# Patient Record
Sex: FEMALE | Race: WHITE | Employment: FULL TIME | URBAN - METROPOLITAN AREA
[De-identification: names, ages, dates, MRNs, and addresses within clinical notes are randomized per-mention and may not be internally consistent; named-entity substitution may affect disease eponyms.]

---
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
## 2022-01-29 ENCOUNTER — HEALTH MAINTENANCE LETTER (OUTPATIENT)
Age: 22
End: 2022-01-29

## 2022-02-07 DIAGNOSIS — M32.9 SYSTEMIC LUPUS ERYTHEMATOSUS, UNSPECIFIED SLE TYPE, UNSPECIFIED ORGAN INVOLVEMENT STATUS (H): Primary | ICD-10-CM

## 2022-02-07 DIAGNOSIS — M32.19 OTHER SYSTEMIC LUPUS ERYTHEMATOSUS WITH OTHER ORGAN INVOLVEMENT (H): Chronic | ICD-10-CM

## 2022-02-07 RX ORDER — PANTOPRAZOLE SODIUM 40 MG/1
40 TABLET, DELAYED RELEASE ORAL DAILY
Qty: 90 TABLET | Refills: 1 | Status: SHIPPED | OUTPATIENT
Start: 2022-02-07 | End: 2022-08-22

## 2022-02-07 RX ORDER — MYCOPHENOLATE MOFETIL 500 MG/1
TABLET ORAL
Qty: 450 TABLET | Refills: 0 | Status: SHIPPED | OUTPATIENT
Start: 2022-02-07 | End: 2022-05-16

## 2022-03-03 ENCOUNTER — DOCUMENTATION ONLY (OUTPATIENT)
Dept: RHEUMATOLOGY | Facility: CLINIC | Age: 22
End: 2022-03-03

## 2022-03-03 ENCOUNTER — OFFICE VISIT (OUTPATIENT)
Dept: RHEUMATOLOGY | Facility: CLINIC | Age: 22
End: 2022-03-03
Attending: INTERNAL MEDICINE
Payer: COMMERCIAL

## 2022-03-03 VITALS
SYSTOLIC BLOOD PRESSURE: 117 MMHG | TEMPERATURE: 98.3 F | WEIGHT: 143.3 LBS | HEART RATE: 82 BPM | DIASTOLIC BLOOD PRESSURE: 78 MMHG | BODY MASS INDEX: 25.39 KG/M2 | HEIGHT: 63 IN

## 2022-03-03 DIAGNOSIS — F41.9 ANXIETY: ICD-10-CM

## 2022-03-03 DIAGNOSIS — F32.A DEPRESSION, UNSPECIFIED DEPRESSION TYPE: ICD-10-CM

## 2022-03-03 DIAGNOSIS — M32.9 SYSTEMIC LUPUS ERYTHEMATOSUS, UNSPECIFIED SLE TYPE, UNSPECIFIED ORGAN INVOLVEMENT STATUS (H): Primary | ICD-10-CM

## 2022-03-03 LAB
ALBUMIN SERPL-MCNC: 3.8 G/DL (ref 3.4–5)
ALBUMIN UR-MCNC: 10 MG/DL
ALP SERPL-CCNC: 65 U/L (ref 40–150)
ALT SERPL W P-5'-P-CCNC: 28 U/L (ref 0–50)
APPEARANCE UR: CLEAR
AST SERPL W P-5'-P-CCNC: 18 U/L (ref 0–35)
BASOPHILS # BLD AUTO: 0 10E3/UL (ref 0–0.2)
BASOPHILS NFR BLD AUTO: 0 %
BILIRUB DIRECT SERPL-MCNC: 0.1 MG/DL (ref 0–0.2)
BILIRUB SERPL-MCNC: 0.4 MG/DL (ref 0.2–1.3)
BILIRUB UR QL STRIP: NEGATIVE
COLOR UR AUTO: YELLOW
CREAT SERPL-MCNC: 0.64 MG/DL (ref 0.5–1)
CREAT UR-MCNC: 200 MG/DL
CRP SERPL-MCNC: <2.9 MG/L (ref 0–8)
EOSINOPHIL # BLD AUTO: 0.1 10E3/UL (ref 0–0.7)
EOSINOPHIL NFR BLD AUTO: 2 %
ERYTHROCYTE [DISTWIDTH] IN BLOOD BY AUTOMATED COUNT: 11.9 % (ref 10–15)
GFR SERPL CREATININE-BSD FRML MDRD: >90 ML/MIN/1.73M2
GLUCOSE UR STRIP-MCNC: NEGATIVE MG/DL
HCT VFR BLD AUTO: 39.4 % (ref 35–47)
HGB BLD-MCNC: 13.1 G/DL (ref 11.7–15.7)
HGB UR QL STRIP: NEGATIVE
IMM GRANULOCYTES # BLD: 0 10E3/UL
IMM GRANULOCYTES NFR BLD: 0 %
KETONES UR STRIP-MCNC: NEGATIVE MG/DL
LEUKOCYTE ESTERASE UR QL STRIP: NEGATIVE
LYMPHOCYTES # BLD AUTO: 2.2 10E3/UL (ref 0.8–5.3)
LYMPHOCYTES NFR BLD AUTO: 36 %
MCH RBC QN AUTO: 30 PG (ref 26.5–33)
MCHC RBC AUTO-ENTMCNC: 33.2 G/DL (ref 31.5–36.5)
MCV RBC AUTO: 90 FL (ref 78–100)
MONOCYTES # BLD AUTO: 0.4 10E3/UL (ref 0–1.3)
MONOCYTES NFR BLD AUTO: 7 %
MUCOUS THREADS #/AREA URNS LPF: PRESENT /LPF
NEUTROPHILS # BLD AUTO: 3.3 10E3/UL (ref 1.6–8.3)
NEUTROPHILS NFR BLD AUTO: 55 %
NITRATE UR QL: NEGATIVE
NRBC # BLD AUTO: 0 10E3/UL
NRBC BLD AUTO-RTO: 0 /100
PH UR STRIP: 6.5 [PH] (ref 5–7)
PLATELET # BLD AUTO: 337 10E3/UL (ref 150–450)
PROT SERPL-MCNC: 7 G/DL (ref 6.8–8.8)
PROT UR-MCNC: 0.13 G/L
PROT/CREAT 24H UR: 0.07 G/G CR (ref 0–0.2)
RBC # BLD AUTO: 4.36 10E6/UL (ref 3.8–5.2)
RBC URINE: <1 /HPF
SP GR UR STRIP: 1.02 (ref 1–1.03)
SQUAMOUS EPITHELIAL: 2 /HPF
TSH SERPL DL<=0.005 MIU/L-ACNC: 0.55 MU/L (ref 0.4–4)
UROBILINOGEN UR STRIP-MCNC: NORMAL MG/DL
VIT B12 SERPL-MCNC: 563 PG/ML (ref 193–986)
WBC # BLD AUTO: 6.1 10E3/UL (ref 4–11)
WBC URINE: 0 /HPF

## 2022-03-03 PROCEDURE — 99214 OFFICE O/P EST MOD 30 MIN: CPT | Performed by: INTERNAL MEDICINE

## 2022-03-03 PROCEDURE — 86160 COMPLEMENT ANTIGEN: CPT | Performed by: INTERNAL MEDICINE

## 2022-03-03 PROCEDURE — 36415 COLL VENOUS BLD VENIPUNCTURE: CPT | Performed by: INTERNAL MEDICINE

## 2022-03-03 PROCEDURE — 84156 ASSAY OF PROTEIN URINE: CPT | Performed by: INTERNAL MEDICINE

## 2022-03-03 PROCEDURE — 82040 ASSAY OF SERUM ALBUMIN: CPT | Performed by: INTERNAL MEDICINE

## 2022-03-03 PROCEDURE — 84443 ASSAY THYROID STIM HORMONE: CPT | Performed by: INTERNAL MEDICINE

## 2022-03-03 PROCEDURE — 82607 VITAMIN B-12: CPT | Performed by: INTERNAL MEDICINE

## 2022-03-03 PROCEDURE — 86140 C-REACTIVE PROTEIN: CPT | Performed by: INTERNAL MEDICINE

## 2022-03-03 PROCEDURE — 85025 COMPLETE CBC W/AUTO DIFF WBC: CPT | Performed by: INTERNAL MEDICINE

## 2022-03-03 PROCEDURE — 81001 URINALYSIS AUTO W/SCOPE: CPT | Performed by: INTERNAL MEDICINE

## 2022-03-03 PROCEDURE — 82542 COL CHROMOTOGRAPHY QUAL/QUAN: CPT | Performed by: INTERNAL MEDICINE

## 2022-03-03 PROCEDURE — G0463 HOSPITAL OUTPT CLINIC VISIT: HCPCS

## 2022-03-03 PROCEDURE — 82306 VITAMIN D 25 HYDROXY: CPT | Performed by: INTERNAL MEDICINE

## 2022-03-03 PROCEDURE — 86225 DNA ANTIBODY NATIVE: CPT | Performed by: INTERNAL MEDICINE

## 2022-03-03 PROCEDURE — 82565 ASSAY OF CREATININE: CPT | Performed by: INTERNAL MEDICINE

## 2022-03-03 RX ORDER — NABUMETONE 500 MG/1
1000 TABLET, FILM COATED ORAL DAILY
Qty: 60 TABLET | Refills: 5 | Status: SHIPPED | OUTPATIENT
Start: 2022-03-03 | End: 2022-05-26

## 2022-03-03 ASSESSMENT — ANXIETY QUESTIONNAIRES
1. FEELING NERVOUS, ANXIOUS, OR ON EDGE: SEVERAL DAYS
7. FEELING AFRAID AS IF SOMETHING AWFUL MIGHT HAPPEN: MORE THAN HALF THE DAYS
IF YOU CHECKED OFF ANY PROBLEMS ON THIS QUESTIONNAIRE, HOW DIFFICULT HAVE THESE PROBLEMS MADE IT FOR YOU TO DO YOUR WORK, TAKE CARE OF THINGS AT HOME, OR GET ALONG WITH OTHER PEOPLE: SOMEWHAT DIFFICULT
6. BECOMING EASILY ANNOYED OR IRRITABLE: SEVERAL DAYS
GAD7 TOTAL SCORE: 10
2. NOT BEING ABLE TO STOP OR CONTROL WORRYING: SEVERAL DAYS
3. WORRYING TOO MUCH ABOUT DIFFERENT THINGS: MORE THAN HALF THE DAYS
5. BEING SO RESTLESS THAT IT IS HARD TO SIT STILL: SEVERAL DAYS

## 2022-03-03 ASSESSMENT — PATIENT HEALTH QUESTIONNAIRE - PHQ9
5. POOR APPETITE OR OVEREATING: MORE THAN HALF THE DAYS
SUM OF ALL RESPONSES TO PHQ QUESTIONS 1-9: 13

## 2022-03-03 ASSESSMENT — PAIN SCALES - GENERAL: PAINLEVEL: NO PAIN (0)

## 2022-03-03 NOTE — PROGRESS NOTES
Medications:   As of completion of this visit:  Current Outpatient Medications   Medication Sig Dispense Refill     co-enzyme Q-10 100 MG CAPS capsule Take by mouth daily       escitalopram (LEXAPRO) 5 MG tablet        hydroxychloroquine (PLAQUENIL) 200 MG tablet Take 1 tablet (200 mg) by mouth daily For systemic lupus erythematosus diaganosis 90 tablet 1     levonorgestrel (KYLEENA) 19.5 MG IUD Kyleena 17.5 mcg/24 hrs (5yrs) 19.5mg intrauterine device   Take 1 device by intrauterine route.       mycophenolate (GENERIC EQUIVALENT) 500 MG tablet Take 2 tablets(1000 mg)in the a.m. And 3 tablets (1500 mg) in the p.m. 450 tablet 0     nabumetone (RELAFEN) 500 MG tablet Take 2 tablets (1,000 mg) by mouth daily 60 tablet 5     pantoprazole (PROTONIX) 40 MG EC tablet Take 1 tablet (40 mg) by mouth daily 90 tablet 1     vitamin D3 (CHOLECALCIFEROL) 2000 units (50 mcg) tablet Take 2,000 Units by mouth daily       Cobalamine Combinations (B12 FOLATE PO)  (Patient not taking: Reported on 12/16/2021)       Ferrous Sulfate (SLOW FE PO)  (Patient not taking: Reported on 3/3/2022)       Prescribed medications have been administered regularly, without missed doses, and the medications have been tolerated well, without side effects.         Allergies:     Allergies   Allergen Reactions     Cats Itching           Problem list:     Patient Active Problem List    Diagnosis Date Noted     Proteinuria 01/10/2018     Priority: High     Depression, unspecified depression type 03/04/2022     Priority: Medium     Other systemic lupus erythematosus with other organ involvement (H) 07/31/2017     Priority: Medium     Anxiety 09/26/2016     Priority: Medium     Systemic lupus erythematosus (H) 09/10/2016     Priority: Medium     Arthritis (now improved), positive dsDNA, hypocomplementemia, Leydi positive (now negative).     No nephritis, ECHO and PFTs within normal limits    [facility]    Brain MRI essentially normal but suboptimal evaluation due to braces. Anti-neuronal antibody and ribosomal P antibodies negative.        Chronic abdominal pain 09/10/2016     Priority: Medium            Subjective:     Lorena is a 21 year old female who was seen in Pediatric Rheumatology clinic today for follow up of systemic lupus erythematosus (SLE). Lorena is unaccompanied today. At the last visit 3 months ago, she was doing well thus we made no changes to therapies. Since that time she has been doing well, overall. She feels her lupus is overall pretty good. Had wisdom teeth removed, then then the stomach flu, then a cold. She is worried she might flare due to all the stress and infections. She states that she is \"always worried that she will flare\".     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 She is a , harder on her body than she thought. 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 She continues to be grateful for this gap year so she can focus on her mental health.     Her arthritis has been pretty bad lately (for a few months). Her whole body is sore (knees, hips, upper body/shoulders back). She thinks this might be partly cold weather or gymnastics job, but it may also be related to her lupus. The pain worsen as the day goes on and becomes very bad in the evening. She is no longer doing her PT exercises. She is not taking nabumetone. She recalls that when her depression got bad her mom wondered if it was from nabumetone so she stopped it. She has been taking naproxen instead. Lorena does not think the nabumetone was the cause of her depression and is open to trying it again.     Having a lot of night sweats for about a month or two. No other significant symptoms.     A 14-point review of systems was negative except as follows: anxiety, depression fatigue, night sweats, abdominal pain, constipation, " "easy bruising.            Examination:   Blood pressure 117/78, pulse 82, temperature 98.3  F (36.8  C), temperature source Oral, height 1.592 m (5' 2.68\"), weight 65 kg (143 lb 4.8 oz).  74 %ile (Z= 0.65) based on CDC (Girls, 2-20 Years) weight-for-age data using vitals from 3/3/2022.  Blood pressure percentiles are not available for patients who are 18 years or older.  Gen: Pleasant, well-appearing, NAD  HEENT/Neck: TM's clear bilaterally, oropharynx is clear without lesions, neck is supple with no lymphadenopathy                  CV: Regular rate and rhythm, normal S1, S2, no murmurs  Resp: Clear to ascultation bilaterally  Abd: Soft, non-tender, non-distended, no hepatosplenomegaly  Skin: Clear, there is no rash  MSK: All joints were examined including TMJ, sternoclavicular, acromioclavicular, neck, shoulder, elbow, wrist, hips, knees, ankles, fingers, and toes, and all were normal          Last Lab Results:     Office Visit on 03/03/2022   Component Date Value Ref Range Status     C3 Complement 03/03/2022 110  81 - 157 mg/dL Final     C4 Complement 03/03/2022 17  13 - 39 mg/dL Final     Creatinine 03/03/2022 0.64  0.50 - 1.00 mg/dL Final     GFR Estimate 03/03/2022 >90  >60 mL/min/1.73m2 Final    Effective December 21, 2021 eGFRcr in adults is calculated using the 2021 CKD-EPI creatinine equation which includes age and gender (Constantine et al., NE, DOI: 10.1056/QDVMgl6430363)     CRP Inflammation 03/03/2022 <2.9  0.0 - 8.0 mg/L Final     DNA (ds) Antibody 03/03/2022 39.0 (A) <10.0 IU/mL Final    Positive     Bilirubin Total 03/03/2022 0.4  0.2 - 1.3 mg/dL Final     Bilirubin Direct 03/03/2022 0.1  0.0 - 0.2 mg/dL Final     Protein Total 03/03/2022 7.0  6.8 - 8.8 g/dL Final     Albumin 03/03/2022 3.8  3.4 - 5.0 g/dL Final     Alkaline Phosphatase 03/03/2022 65  40 - 150 U/L Final     AST 03/03/2022 18  0 - 35 U/L Final     ALT 03/03/2022 28  0 - 50 U/L Final     Color Urine 03/03/2022 Yellow  Colorless, Straw, " Light Yellow, Yellow Final     Appearance Urine 03/03/2022 Clear  Clear Final     Glucose Urine 03/03/2022 Negative  Negative mg/dL Final     Bilirubin Urine 03/03/2022 Negative  Negative Final     Ketones Urine 03/03/2022 Negative  Negative mg/dL Final     Specific Gravity Urine 03/03/2022 1.023  1.003 - 1.035 Final     Blood Urine 03/03/2022 Negative  Negative Final     pH Urine 03/03/2022 6.5  5.0 - 7.0 Final     Protein Albumin Urine 03/03/2022 10  (A) Negative mg/dL Final     Urobilinogen Urine 03/03/2022 Normal  Normal, 2.0 mg/dL Final     Nitrite Urine 03/03/2022 Negative  Negative Final     Leukocyte Esterase Urine 03/03/2022 Negative  Negative Final     Mucus Urine 03/03/2022 Present (A) None Seen /LPF Final     RBC Urine 03/03/2022 <1  <=2 /HPF Final     WBC Urine 03/03/2022 0  <=5 /HPF Final     Squamous Epithelials Urine 03/03/2022 2 (A) <=1 /HPF Final     Total Protein Random Urine g/L 03/03/2022 0.13  g/L Final    The reference range has not been established for total protein in random urine samples.  The result should be integrated into the clinical context for interpretation.     Total Protein Urine g/gr Creatinine 03/03/2022 0.07  0.00 - 0.20 g/g Cr Final     Creatinine Urine mg/dL 03/03/2022 200  mg/dL Final     TSH 03/03/2022 0.55  0.40 - 4.00 mU/L Final     Vitamin B12 03/03/2022 563  193 - 986 pg/mL Final     Vitamin D, Total (25-Hydroxy) 03/03/2022 36  20 - 75 ug/L Final     WBC Count 03/03/2022 6.1  4.0 - 11.0 10e3/uL Final     RBC Count 03/03/2022 4.36  3.80 - 5.20 10e6/uL Final     Hemoglobin 03/03/2022 13.1  11.7 - 15.7 g/dL Final     Hematocrit 03/03/2022 39.4  35.0 - 47.0 % Final     MCV 03/03/2022 90  78 - 100 fL Final     MCH 03/03/2022 30.0  26.5 - 33.0 pg Final     MCHC 03/03/2022 33.2  31.5 - 36.5 g/dL Final     RDW 03/03/2022 11.9  10.0 - 15.0 % Final     Platelet Count 03/03/2022 337  150 - 450 10e3/uL Final     % Neutrophils 03/03/2022 55  % Final     % Lymphocytes 03/03/2022 36   % Final     % Monocytes 03/03/2022 7  % Final     % Eosinophils 03/03/2022 2  % Final     % Basophils 03/03/2022 0  % Final     % Immature Granulocytes 03/03/2022 0  % Final     NRBCs per 100 WBC 03/03/2022 0  <1 /100 Final     Absolute Neutrophils 03/03/2022 3.3  1.6 - 8.3 10e3/uL Final     Absolute Lymphocytes 03/03/2022 2.2  0.8 - 5.3 10e3/uL Final     Absolute Monocytes 03/03/2022 0.4  0.0 - 1.3 10e3/uL Final     Absolute Eosinophils 03/03/2022 0.1  0.0 - 0.7 10e3/uL Final     Absolute Basophils 03/03/2022 0.0  0.0 - 0.2 10e3/uL Final     Absolute Immature Granulocytes 03/03/2022 0.0  <=0.4 10e3/uL Final     Absolute NRBCs 03/03/2022 0.0  10e3/uL Final              Assessment:     Lorena is a 21 year old female with systemic lupus erythematosus. She is treated with mycophenolate and hydroxychloroquine. The disease is under good control. Therefore we will continue current management.     She is having ongoing joint pain and it continues to be unclear to me how much is her lupus pain and how much is from known joint hypermobility. I had prescribed her nabumetone but she stopped it due to concerns about it causing depression. She does not think it is the cause of her depression and would like to try it again. I prescribed it. If she notices that it triggers a worsening depression we can stop it. I also recommended she work on home exercises that she previously learned from PT.     Given her report of night sweats, I did also add a TSH that was normal. The rest of her lab results are reassuring with a normal CBC. We discussed that the night sweats may reflect her series of infections and will hopefully improve with time.     She asked that I check several vitamin levels. Her vitamin D level is at the low end of normal. I recommend she continue to take her vitamin D supplements. B12 was normal. Coenzyme Q10 is pending.          Plan:     1. Monitoring labs were obtained today.   2. Continue mycophenolate and  hydroxychloroquine.  3. Switch from naproxen to nabumetone.   4. Try home PT exercises.   5. Continue to work with therapist on mental health.   6. I recommend annual eye exams while on hydroxychlorquine (Plaquenil).   7. Return in about 3 months (around 6/3/2022). Call sooner with any concerns.     If there are any new questions or concerns, I would be glad to help and can be reached through our main office at [phone] or our paging  at [phone].    30 minutes spent on the date of the encounter doing chart review, history and exam, documentation and further activities as noted above.     Petra Will MD  Pediatric Rheumatology  [facility]

## 2022-03-03 NOTE — NURSING NOTE
"Chief Complaint   Patient presents with     RECHECK     6 month f/u 'no new concerns'       /78 (BP Location: Right arm, Patient Position: Sitting, Cuff Size: Adult Regular)   Pulse 82   Temp 98.3  F (36.8  C) (Oral)   Ht 5' 2.68\" (159.2 cm)   Wt 143 lb 4.8 oz (65 kg)   BMI 25.65 kg/m      Kirsten Hearn, EMT  March 3, 2022  "

## 2022-03-03 NOTE — PATIENT INSTRUCTIONS
For Patient Education Materials:  z.[facility]/jacky       [facility] Pediatric Rheumatology    For Help:  The Pediatric Call Center at [phone] can help with scheduling of routine follow up visits.  Yana Huerta and Alcira Roe are the Nurse Coordinators for the Division of Pediatric Rheumatology and can be reached by phone at [phone] or through [employer] ([email or web address]). They can help with questions about your child s rheumatic condition, medications, and test results.  For emergencies after hours or on the weekends, please call the page  at [phone] and ask to speak to the physician on-call for Pediatric Rheumatology. Please do not use [employer] for urgent requests.  Main  Services:  [phone]  o Hmong/Bhutanese/Keegan: [phone]  o Turks and [place] Islander: [phone]  o Lithuanian: [phone]    Internal Referrals: If we refer your child to another physician/team within [facility], you should receive a call to set this up. If you do not hear anything within a week, please call the Call Center at [phone].    External Referrals: If we refer your child to a physician/team outside of [facility], our team will send the referral order and relevant records to them. We ask that you call the place where your child is being referred to ensure they received the needed information and notify our team coordinators if not.    Imaging: If your child needs an imaging study that is not being performed the day of your clinic appointment, please call to set this up. For xrays, ultrasounds, and echocardiogram call [phone]. For CT or MRI call [phone].     MyChart: We encourage you to sign up for [employer] at [email or web address]. For assistance or questions, call [phone]. If your child is 12 years or older, a consent for proxy/parent access needs to be signed so please discuss this with your physician at the next visit.

## 2022-03-03 NOTE — LETTER
3/3/2022      RE: Lorena Fritz  [address] [facility] [address]              Medications:   As of completion of this visit:  Current Outpatient Medications   Medication Sig Dispense Refill     co-enzyme Q-10 100 MG CAPS capsule Take by mouth daily       escitalopram (LEXAPRO) 5 MG tablet        hydroxychloroquine (PLAQUENIL) 200 MG tablet Take 1 tablet (200 mg) by mouth daily For systemic lupus erythematosus diaganosis 90 tablet 1     levonorgestrel (KYLEENA) 19.5 MG IUD Kyleena 17.5 mcg/24 hrs (5yrs) 19.5mg intrauterine device   Take 1 device by intrauterine route.       mycophenolate (GENERIC EQUIVALENT) 500 MG tablet Take 2 tablets(1000 mg)in the a.m. And 3 tablets (1500 mg) in the p.m. 450 tablet 0     nabumetone (RELAFEN) 500 MG tablet Take 2 tablets (1,000 mg) by mouth daily 60 tablet 5     pantoprazole (PROTONIX) 40 MG EC tablet Take 1 tablet (40 mg) by mouth daily 90 tablet 1     vitamin D3 (CHOLECALCIFEROL) 2000 units (50 mcg) tablet Take 2,000 Units by mouth daily       Cobalamine Combinations (B12 FOLATE PO)  (Patient not taking: Reported on 12/16/2021)       Ferrous Sulfate (SLOW FE PO)  (Patient not taking: Reported on 3/3/2022)       Prescribed medications have been administered regularly, without missed doses, and the medications have been tolerated well, without side effects.         Allergies:     Allergies   Allergen Reactions     Cats Itching           Problem list:     Patient Active Problem List    Diagnosis Date Noted     Proteinuria 01/10/2018     Priority: High     Depression, unspecified depression type 03/04/2022     Priority: Medium     Other systemic lupus erythematosus with other organ involvement (H) 07/31/2017     Priority: Medium     Anxiety 09/26/2016     Priority: Medium     Systemic lupus erythematosus (H) 09/10/2016     Priority: Medium     Arthritis (now improved), positive dsDNA, hypocomplementemia, Leydi positive (now negative).     No nephritis, ECHO and PFTs within  "normal limits    [facility])    Brain MRI essentially normal but suboptimal evaluation due to braces. Anti-neuronal antibody and ribosomal P antibodies negative.        Chronic abdominal pain 09/10/2016     Priority: Medium            Subjective:     Lorena is a 21 year old female who was seen in Pediatric Rheumatology clinic today for follow up of systemic lupus erythematosus (SLE). Lorena is unaccompanied today. At the last visit 3 months ago, she was doing well thus we made no changes to therapies. Since that time she has been doing well, overall. She feels her lupus is overall pretty good. Had wisdom teeth removed, then then the stomach flu, then a cold. She is worried she might flare due to all the stress and infections. She states that she is \"always worried that she will flare\".     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 She is a , harder on her body than she thought. 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 She continues to be grateful for this gap year so she can focus on her mental health.     Her arthritis has been pretty bad lately (for a few months). Her whole body is sore (knees, hips, upper body/shoulders back). She thinks this might be partly cold weather or gymnastics job, but it may also be related to her lupus. The pain worsen as the day goes on and becomes very bad in the evening. She is no longer doing her PT exercises. She is not taking nabumetone. She recalls that when her depression got bad her mom wondered if it was from nabumetone so she stopped it. She has been taking naproxen instead. Lorena does not think the nabumetone was the cause of her depression and is open to trying it again.     Having a lot of night sweats for about a month or two. No other significant symptoms.     A 14-point review of systems was negative except as follows: anxiety, " "depression fatigue, night sweats, abdominal pain, constipation, easy bruising.            Examination:   Blood pressure 117/78, pulse 82, temperature 98.3  F (36.8  C), temperature source Oral, height 1.592 m (5' 2.68\"), weight 65 kg (143 lb 4.8 oz).  74 %ile (Z= 0.65) based on CDC (Girls, 2-20 Years) weight-for-age data using vitals from 3/3/2022.  Blood pressure percentiles are not available for patients who are 18 years or older.  Gen: Pleasant, well-appearing, NAD  HEENT/Neck: TM's clear bilaterally, oropharynx is clear without lesions, neck is supple with no lymphadenopathy                  CV: Regular rate and rhythm, normal S1, S2, no murmurs  Resp: Clear to ascultation bilaterally  Abd: Soft, non-tender, non-distended, no hepatosplenomegaly  Skin: Clear, there is no rash  MSK: All joints were examined including TMJ, sternoclavicular, acromioclavicular, neck, shoulder, elbow, wrist, hips, knees, ankles, fingers, and toes, and all were normal          Last Lab Results:     Office Visit on 03/03/2022   Component Date Value Ref Range Status     C3 Complement 03/03/2022 110  81 - 157 mg/dL Final     C4 Complement 03/03/2022 17  13 - 39 mg/dL Final     Creatinine 03/03/2022 0.64  0.50 - 1.00 mg/dL Final     GFR Estimate 03/03/2022 >90  >60 mL/min/1.73m2 Final    Effective December 21, 2021 eGFRcr in adults is calculated using the 2021 CKD-EPI creatinine equation which includes age and gender (Constantine et al., NEJM, DOI: 10.1056/JSLHbk4151140)     CRP Inflammation 03/03/2022 <2.9  0.0 - 8.0 mg/L Final     DNA (ds) Antibody 03/03/2022 39.0 (A) <10.0 IU/mL Final    Positive     Bilirubin Total 03/03/2022 0.4  0.2 - 1.3 mg/dL Final     Bilirubin Direct 03/03/2022 0.1  0.0 - 0.2 mg/dL Final     Protein Total 03/03/2022 7.0  6.8 - 8.8 g/dL Final     Albumin 03/03/2022 3.8  3.4 - 5.0 g/dL Final     Alkaline Phosphatase 03/03/2022 65  40 - 150 U/L Final     AST 03/03/2022 18  0 - 35 U/L Final     ALT 03/03/2022 28  0 - 50 " U/L Final     Color Urine 03/03/2022 Yellow  Colorless, Straw, Light Yellow, Yellow Final     Appearance Urine 03/03/2022 Clear  Clear Final     Glucose Urine 03/03/2022 Negative  Negative mg/dL Final     Bilirubin Urine 03/03/2022 Negative  Negative Final     Ketones Urine 03/03/2022 Negative  Negative mg/dL Final     Specific Gravity Urine 03/03/2022 1.023  1.003 - 1.035 Final     Blood Urine 03/03/2022 Negative  Negative Final     pH Urine 03/03/2022 6.5  5.0 - 7.0 Final     Protein Albumin Urine 03/03/2022 10  (A) Negative mg/dL Final     Urobilinogen Urine 03/03/2022 Normal  Normal, 2.0 mg/dL Final     Nitrite Urine 03/03/2022 Negative  Negative Final     Leukocyte Esterase Urine 03/03/2022 Negative  Negative Final     Mucus Urine 03/03/2022 Present (A) None Seen /LPF Final     RBC Urine 03/03/2022 <1  <=2 /HPF Final     WBC Urine 03/03/2022 0  <=5 /HPF Final     Squamous Epithelials Urine 03/03/2022 2 (A) <=1 /HPF Final     Total Protein Random Urine g/L 03/03/2022 0.13  g/L Final    The reference range has not been established for total protein in random urine samples.  The result should be integrated into the clinical context for interpretation.     Total Protein Urine g/gr Creatinine 03/03/2022 0.07  0.00 - 0.20 g/g Cr Final     Creatinine Urine mg/dL 03/03/2022 200  mg/dL Final     TSH 03/03/2022 0.55  0.40 - 4.00 mU/L Final     Vitamin B12 03/03/2022 563  193 - 986 pg/mL Final     Vitamin D, Total (25-Hydroxy) 03/03/2022 36  20 - 75 ug/L Final     WBC Count 03/03/2022 6.1  4.0 - 11.0 10e3/uL Final     RBC Count 03/03/2022 4.36  3.80 - 5.20 10e6/uL Final     Hemoglobin 03/03/2022 13.1  11.7 - 15.7 g/dL Final     Hematocrit 03/03/2022 39.4  35.0 - 47.0 % Final     MCV 03/03/2022 90  78 - 100 fL Final     MCH 03/03/2022 30.0  26.5 - 33.0 pg Final     MCHC 03/03/2022 33.2  31.5 - 36.5 g/dL Final     RDW 03/03/2022 11.9  10.0 - 15.0 % Final     Platelet Count 03/03/2022 337  150 - 450 10e3/uL Final     %  Neutrophils 03/03/2022 55  % Final     % Lymphocytes 03/03/2022 36  % Final     % Monocytes 03/03/2022 7  % Final     % Eosinophils 03/03/2022 2  % Final     % Basophils 03/03/2022 0  % Final     % Immature Granulocytes 03/03/2022 0  % Final     NRBCs per 100 WBC 03/03/2022 0  <1 /100 Final     Absolute Neutrophils 03/03/2022 3.3  1.6 - 8.3 10e3/uL Final     Absolute Lymphocytes 03/03/2022 2.2  0.8 - 5.3 10e3/uL Final     Absolute Monocytes 03/03/2022 0.4  0.0 - 1.3 10e3/uL Final     Absolute Eosinophils 03/03/2022 0.1  0.0 - 0.7 10e3/uL Final     Absolute Basophils 03/03/2022 0.0  0.0 - 0.2 10e3/uL Final     Absolute Immature Granulocytes 03/03/2022 0.0  <=0.4 10e3/uL Final     Absolute NRBCs 03/03/2022 0.0  10e3/uL Final              Assessment:     Lorena is a 21 year old female with systemic lupus erythematosus. She is treated with mycophenolate and hydroxychloroquine. The disease is under good control. Therefore we will continue current management.     She is having ongoing joint pain and it continues to be unclear to me how much is her lupus pain and how much is from known joint hypermobility. I had prescribed her nabumetone but she stopped it due to concerns about it causing depression. She does not think it is the cause of her depression and would like to try it again. I prescribed it. If she notices that it triggers a worsening depression we can stop it. I also recommended she work on home exercises that she previously learned from PT.     Given her report of night sweats, I did also add a TSH that was normal. The rest of her lab results are reassuring with a normal CBC. We discussed that the night sweats may reflect her series of infections and will hopefully improve with time.     She asked that I check several vitamin levels. Her vitamin D level is at the low end of normal. I recommend she continue to take her vitamin D supplements. B12 was normal. Coenzyme Q10 is pending.          Plan:     1. Monitoring  labs were obtained today.   2. Continue mycophenolate and hydroxychloroquine.  3. Switch from naproxen to nabumetone.   4. Try home PT exercises.   5. Continue to work with therapist on mental health.   6. I recommend annual eye exams while on hydroxychlorquine (Plaquenil).   7. Return in about 3 months (around 6/3/2022). Call sooner with any concerns.     If there are any new questions or concerns, I would be glad to help and can be reached through our main office at [phone] or our paging  at [phone].    30 minutes spent on the date of the encounter doing chart review, history and exam, documentation and further activities as noted above.     Petra Will MD  Pediatric Rheumatology  [facility]

## 2022-03-03 NOTE — PROGRESS NOTES
"Data/Assessment:  Pt is a 21 y.o. female, in clinic alone today for routine SLE follow-up visit and lab work.  Her initial diagnosis was in 2016.  Pt currently lives at home with her mother, father, and 18 y.o. brother.  Pt is her own medical decision maker, and is a college student currently taking a \"gap year\".  Lorena will resume studies this coming Fall at [employer].  She is actively working on setting up a 504 plan there to assist with her Lupus symptoms.  She works as a , , and at a camp up [place] on weekends.  Pt reports that she stays busy, as that helps distract from her anxiety.      Pt states \"I am queer\", and has been working on how this relates to the other aspects of her life, including her spirituality.  She reports that this is not allowed in [place] in, but her unique is important to her, so she is currently exploring other [place] options that match her values.  Pt reports feeling safe in all relationships, as well as at home and at school.      Pt identifies her family and 2 close friends as her current support system. She reports that she is coping with her Lupus, but has a \"hard time\" with her symptoms (joint pain, fatigue, memory loss, etc.).  Pt reports that Lupus and her treatments \"sometimes\" keep her from doing what she wants to do in life.  Lorena reports that she is confident in her ability to get her medications, but self-rates as 3:10 in taking the medications.  She reports not liking the side effects, so she \"only wants to take certain ones\".  Pt self-reports at a 10:10 for setting up and attending her Rheumatology appts.  In the past, pt found Lupus camps helpful, as she appreciated the opportunity to meet others with SLE.  Pt is able to advocate for self as needed.     Pt reports having difficulty falling asleep, but is then able to sleep the rest of the night.  She says that she \"doesn't have the same " "hunger cues\" since being dx with SLE, and has to remind self to eat.  Lorena is active/exercises, but reports that the amount depends on her pain level, but when well, she enjoys gymnastics and indoor rock climbing. Pt is \"hopeful about life, because there are fun things on the horizon\".  Pt feels somewhat socially isolated, but that's due to working only with adults and children.  She is excited to connect with peers at college this Fall.  Pt denies SIB, hallucinations, delusions, and current suicidality.  Pt states that at times she has the fleeting thought that she'd \"be better off not alive\", but she has no plan/intent.      Pt endorses \"often\" experiencing increased anxiety and depression due to Lupus and caring for her health.  Pt believes that her anxiety was \"Lupus-induced\", and self-rates anxiety as 6:10 with a baseline of 4:10.  Pt notes that it is elevated to a \"6\" today due to worry about her lab results at appt today.  Pt's anxiety symptoms may include: ruminating, overthinking, past social anxiety (better now), chest tightening, fidgeting, occasional panic attacks, hyperventilating, and crying.  Lorena has a specific aversion to IVs and shots.      Pt endorses depression since the pandemic started, and self-rates as 4:10 today, with a baseline of 5:10.  Pt endorses hopeless thoughts, feeling sad, crying, and lacking motivation to get out of bed at times as her constellation of depressive symptoms.      The positive news is that pt has been receiving services for her mental health for a few years, including seeing a therapist and receiving medication therapy (Lexapro) through her PCP.  Pt has seen Rad Denny at [facility] for the past six months, and reports having strong rapport with him.  Pt has no hx of MH hospitalizations.  Pt is comfortable reaching out to her PCP, therapist, or family if she ever has suicidal thoughts or notices that her MH is deteriorating for any reason.  "     Plan/Recommendations:  -Continue to work with Leo Castellon on 504 plan.  Reach out to [facility] for verification letter, if needed.      -Continue to follow-up with Rad Denny at [facility] for therapy and her PCP for medication    -In future, pt expressed some interest in seeing a Lupus-specific therapist and joining a Young Adult Support Group for SLE    -Pt denies any other needs at this time, and no new resources are needed.          Jocelin Jean, MSW/PATRICIA  Clinical /Lupus Mental Health Navigator   Pediatric Rheumatology

## 2022-03-03 NOTE — CONFIDENTIAL NOTE
Data:  Pt endorses using marijuana approximately 1 time/week.  Pt is not concerned about use.      Assessment:  Pt was concerned about the confidentiality of sharing this, and wanted to ensure that it was not known to her parents.        Plan/Recommendations:  Pt's current use does not seem to indicate a need for intervention at this time.  Will continue to monitor at future visits to reassess.          PINEDA Gray/PATRICIA  Clinical /Lupus Mental Health Navigator   Pediatric Rheumatology

## 2022-03-04 PROBLEM — F32.A DEPRESSION, UNSPECIFIED DEPRESSION TYPE: Status: ACTIVE | Noted: 2022-03-04

## 2022-03-04 LAB
C3 SERPL-MCNC: 110 MG/DL (ref 81–157)
C4 SERPL-MCNC: 17 MG/DL (ref 13–39)
DEPRECATED CALCIDIOL+CALCIFEROL SERPL-MC: 36 UG/L (ref 20–75)
DSDNA AB SER-ACNC: 39 IU/ML

## 2022-03-04 ASSESSMENT — ANXIETY QUESTIONNAIRES: GAD7 TOTAL SCORE: 10

## 2022-03-06 LAB — UBIQUINONE10 SERPL-MCNC: 1.4 MG/L

## 2022-05-16 DIAGNOSIS — M32.19 OTHER SYSTEMIC LUPUS ERYTHEMATOSUS WITH OTHER ORGAN INVOLVEMENT (H): Primary | Chronic | ICD-10-CM

## 2022-05-16 RX ORDER — MYCOPHENOLATE MOFETIL 500 MG/1
TABLET ORAL
Qty: 450 TABLET | Refills: 0 | Status: SHIPPED | OUTPATIENT
Start: 2022-05-16 | End: 2023-02-02

## 2022-05-26 ENCOUNTER — OFFICE VISIT (OUTPATIENT)
Dept: RHEUMATOLOGY | Facility: CLINIC | Age: 22
End: 2022-05-26
Attending: INTERNAL MEDICINE
Payer: COMMERCIAL

## 2022-05-26 VITALS
DIASTOLIC BLOOD PRESSURE: 80 MMHG | WEIGHT: 144.62 LBS | HEIGHT: 63 IN | RESPIRATION RATE: 16 BRPM | BODY MASS INDEX: 25.62 KG/M2 | HEART RATE: 100 BPM | SYSTOLIC BLOOD PRESSURE: 120 MMHG | TEMPERATURE: 99.6 F

## 2022-05-26 DIAGNOSIS — M32.9 SYSTEMIC LUPUS ERYTHEMATOSUS, UNSPECIFIED SLE TYPE, UNSPECIFIED ORGAN INVOLVEMENT STATUS (H): ICD-10-CM

## 2022-05-26 LAB
ALBUMIN SERPL-MCNC: 3.9 G/DL (ref 3.4–5)
ALBUMIN UR-MCNC: NEGATIVE MG/DL
ALP SERPL-CCNC: 80 U/L (ref 40–150)
ALT SERPL W P-5'-P-CCNC: 26 U/L (ref 0–50)
APPEARANCE UR: CLEAR
AST SERPL W P-5'-P-CCNC: 18 U/L (ref 0–35)
BASOPHILS # BLD AUTO: 0 10E3/UL (ref 0–0.2)
BASOPHILS NFR BLD AUTO: 0 %
BILIRUB DIRECT SERPL-MCNC: 0.1 MG/DL (ref 0–0.2)
BILIRUB SERPL-MCNC: 0.5 MG/DL (ref 0.2–1.3)
BILIRUB UR QL STRIP: NEGATIVE
COLOR UR AUTO: ABNORMAL
CREAT SERPL-MCNC: 0.52 MG/DL (ref 0.5–1)
CREAT UR-MCNC: 152 MG/DL
CRP SERPL-MCNC: <2.9 MG/L (ref 0–8)
EOSINOPHIL # BLD AUTO: 0 10E3/UL (ref 0–0.7)
EOSINOPHIL NFR BLD AUTO: 1 %
ERYTHROCYTE [DISTWIDTH] IN BLOOD BY AUTOMATED COUNT: 12.1 % (ref 10–15)
GFR SERPL CREATININE-BSD FRML MDRD: >90 ML/MIN/1.73M2
GLUCOSE UR STRIP-MCNC: NEGATIVE MG/DL
HCT VFR BLD AUTO: 39.3 % (ref 35–47)
HGB BLD-MCNC: 13.3 G/DL (ref 11.7–15.7)
HGB UR QL STRIP: NEGATIVE
IMM GRANULOCYTES # BLD: 0 10E3/UL
IMM GRANULOCYTES NFR BLD: 0 %
KETONES UR STRIP-MCNC: NEGATIVE MG/DL
LEUKOCYTE ESTERASE UR QL STRIP: NEGATIVE
LYMPHOCYTES # BLD AUTO: 1.9 10E3/UL (ref 0.8–5.3)
LYMPHOCYTES NFR BLD AUTO: 31 %
MCH RBC QN AUTO: 30 PG (ref 26.5–33)
MCHC RBC AUTO-ENTMCNC: 33.8 G/DL (ref 31.5–36.5)
MCV RBC AUTO: 89 FL (ref 78–100)
MONOCYTES # BLD AUTO: 0.5 10E3/UL (ref 0–1.3)
MONOCYTES NFR BLD AUTO: 9 %
MUCOUS THREADS #/AREA URNS LPF: PRESENT /LPF
NEUTROPHILS # BLD AUTO: 3.7 10E3/UL (ref 1.6–8.3)
NEUTROPHILS NFR BLD AUTO: 59 %
NITRATE UR QL: NEGATIVE
NRBC # BLD AUTO: 0 10E3/UL
NRBC BLD AUTO-RTO: 0 /100
PH UR STRIP: 6 [PH] (ref 5–7)
PLATELET # BLD AUTO: 271 10E3/UL (ref 150–450)
PROT SERPL-MCNC: 7.8 G/DL (ref 6.8–8.8)
PROT UR-MCNC: 0.12 G/L
PROT/CREAT 24H UR: 0.08 G/G CR (ref 0–0.2)
RBC # BLD AUTO: 4.43 10E6/UL (ref 3.8–5.2)
RBC URINE: 1 /HPF
SP GR UR STRIP: 1.01 (ref 1–1.03)
SQUAMOUS EPITHELIAL: 3 /HPF
UROBILINOGEN UR STRIP-MCNC: NORMAL MG/DL
VIT B12 SERPL-MCNC: 649 PG/ML (ref 193–986)
WBC # BLD AUTO: 6.1 10E3/UL (ref 4–11)
WBC URINE: 1 /HPF

## 2022-05-26 PROCEDURE — G0463 HOSPITAL OUTPT CLINIC VISIT: HCPCS

## 2022-05-26 PROCEDURE — 36415 COLL VENOUS BLD VENIPUNCTURE: CPT | Performed by: INTERNAL MEDICINE

## 2022-05-26 PROCEDURE — 84156 ASSAY OF PROTEIN URINE: CPT | Performed by: INTERNAL MEDICINE

## 2022-05-26 PROCEDURE — 85004 AUTOMATED DIFF WBC COUNT: CPT | Performed by: INTERNAL MEDICINE

## 2022-05-26 PROCEDURE — 81001 URINALYSIS AUTO W/SCOPE: CPT | Performed by: INTERNAL MEDICINE

## 2022-05-26 PROCEDURE — 86225 DNA ANTIBODY NATIVE: CPT | Performed by: INTERNAL MEDICINE

## 2022-05-26 PROCEDURE — 80076 HEPATIC FUNCTION PANEL: CPT | Performed by: INTERNAL MEDICINE

## 2022-05-26 PROCEDURE — 82607 VITAMIN B-12: CPT | Performed by: INTERNAL MEDICINE

## 2022-05-26 PROCEDURE — 86160 COMPLEMENT ANTIGEN: CPT | Performed by: INTERNAL MEDICINE

## 2022-05-26 PROCEDURE — 82542 COL CHROMOTOGRAPHY QUAL/QUAN: CPT | Performed by: INTERNAL MEDICINE

## 2022-05-26 PROCEDURE — 82565 ASSAY OF CREATININE: CPT | Performed by: INTERNAL MEDICINE

## 2022-05-26 PROCEDURE — 99213 OFFICE O/P EST LOW 20 MIN: CPT | Performed by: INTERNAL MEDICINE

## 2022-05-26 PROCEDURE — 86140 C-REACTIVE PROTEIN: CPT | Performed by: INTERNAL MEDICINE

## 2022-05-26 RX ORDER — NABUMETONE 500 MG/1
1000 TABLET, FILM COATED ORAL DAILY
Qty: 180 TABLET | Refills: 2 | Status: SHIPPED | OUTPATIENT
Start: 2022-05-26 | End: 2023-05-15

## 2022-05-26 ASSESSMENT — PAIN SCALES - GENERAL: PAINLEVEL: SEVERE PAIN (7)

## 2022-05-26 NOTE — LETTER
5/26/2022      RE: Lorena Fritz  [address] [facility] [address]     Dear Colleague,    Thank you for the opportunity to participate in the care of your patient, Lorena Fritz, at the [facility] at [facility]. Please see a copy of my visit note below.           Medications:   As of completion of this visit:  Current Outpatient Medications   Medication Sig Dispense Refill     nabumetone (RELAFEN) 500 MG tablet Take 2 tablets (1,000 mg) by mouth daily 180 tablet 2     co-enzyme Q-10 100 MG CAPS capsule Take by mouth daily       Cobalamine Combinations (B12 FOLATE PO)  (Patient not taking: Reported on 12/16/2021)       escitalopram (LEXAPRO) 5 MG tablet        Ferrous Sulfate (SLOW FE PO)  (Patient not taking: Reported on 3/3/2022)       hydroxychloroquine (PLAQUENIL) 200 MG tablet Take 1 tablet (200 mg) by mouth daily For systemic lupus erythematosus diaganosis 90 tablet 1     levonorgestrel (KYLEENA) 19.5 MG IUD Kyleena 17.5 mcg/24 hrs (5yrs) 19.5mg intrauterine device   Take 1 device by intrauterine route.       mycophenolate (GENERIC EQUIVALENT) 500 MG tablet Take 2 tablets(1000 mg)in the a.m. And 3 tablets (1500 mg) in the p.m. 450 tablet 0     pantoprazole (PROTONIX) 40 MG EC tablet Take 1 tablet (40 mg) by mouth daily 90 tablet 1     vitamin D3 (CHOLECALCIFEROL) 2000 units (50 mcg) tablet Take 2,000 Units by mouth daily       Prescribed medications have been administered regularly, without missed doses, and the medications have been tolerated well, without side effects.         Allergies:     Allergies   Allergen Reactions     Cats Itching         Problem list:     Patient Active Problem List    Diagnosis Date Noted     Proteinuria 01/10/2018     Priority: High     Depression, unspecified depression type 03/04/2022     Priority: Medium     Other systemic lupus erythematosus with other organ involvement (H)  "07/31/2017     Priority: Medium     Anxiety 09/26/2016     Priority: Medium     Systemic lupus erythematosus (H) 09/10/2016     Priority: Medium     Arthritis (now improved), positive dsDNA, hypocomplementemia, Leydi positive (now negative).     No nephritis, ECHO and PFTs within normal limits    [facility])    Brain MRI essentially normal but suboptimal evaluation due to braces. Anti-neuronal antibody and ribosomal P antibodies negative.        Chronic abdominal pain 09/10/2016     Priority: Medium          Subjective:     Lorena is a 21 year old female who was seen in Pediatric Rheumatology clinic today for follow up of systemic lupus erythematosus (SLE). Lorena is unaccompanied today. At the last visit 3 months ago, her lupus was under control, but she was having ongoing joint pain so we restarted nabumetone.     Since that time she has been doing very well. Her joints have been hurting but she thinks it might be from traveling and bad weather. Nabumetone helps a lot, but she often only remembers to take it in the morning. She has not had other symptoms concerning for lupus. She is still having night sweats. She's not overall worried about it, but mentioned it to me.     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     Lexapro is helping a lot with her mood.       Brother currently has COVID.     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 Has not been working the last month. She starts at [employer] in the Fall and is excited about this.     A 14-point review of systems was negative except as follows: fatigue, night sweats, change in sleep patterns, nausea, numbness/tingling of fingertips off and on, anxiety, depression.            Examination:   Blood pressure 120/80, pulse 100, temperature 99.6  F (37.6  C), temperature source Tympanic, resp. rate 16, height 1.592 m (5' 2.68\"), weight 65.6 kg (144 lb 10 oz).  75 %ile (Z= " 0.68) based on CDC (Girls, 2-20 Years) weight-for-age data using vitals from 5/26/2022.  Blood pressure percentiles are not available for patients who are 18 years or older.  Gen: Pleasant, well-appearing, NAD  HEENT/Neck: TM's clear bilaterally, oropharynx is clear without lesions, neck is supple with no lymphadenopathy                  CV: Regular rate and rhythm, normal S1, S2, no murmurs  Resp: Clear to ascultation bilaterally  Abd: Soft, non-tender, non-distended, no hepatosplenomegaly  Skin: Clear, there is no rash  MSK: All joints were examined including TMJ, sternoclavicular, acromioclavicular, neck, shoulder, elbow, wrist, hips, knees, ankles, fingers, and toes, and all were normal          Last Lab Results:     Office Visit on 05/26/2022   Component Date Value Ref Range Status     C3 Complement 05/26/2022 114  81 - 157 mg/dL Final     C4 Complement 05/26/2022 19  13 - 39 mg/dL Final     Creatinine 05/26/2022 0.52  0.50 - 1.00 mg/dL Final     GFR Estimate 05/26/2022 >90  >60 mL/min/1.73m2 Final    Effective December 21, 2021 eGFRcr in adults is calculated using the 2021 CKD-EPI creatinine equation which includes age and gender (Constantine et al., NE, DOI: 10.1056/LHALia1604969)     CRP Inflammation 05/26/2022 <2.9  0.0 - 8.0 mg/L Final     DNA (ds) Antibody 05/26/2022 40.0 (A) <10.0 IU/mL Final    Positive     Bilirubin Total 05/26/2022 0.5  0.2 - 1.3 mg/dL Final     Bilirubin Direct 05/26/2022 0.1  0.0 - 0.2 mg/dL Final     Protein Total 05/26/2022 7.8  6.8 - 8.8 g/dL Final     Albumin 05/26/2022 3.9  3.4 - 5.0 g/dL Final     Alkaline Phosphatase 05/26/2022 80  40 - 150 U/L Final     AST 05/26/2022 18  0 - 35 U/L Final     ALT 05/26/2022 26  0 - 50 U/L Final     Color Urine 05/26/2022 Light Yellow  Colorless, Straw, Light Yellow, Yellow Final     Appearance Urine 05/26/2022 Clear  Clear Final     Glucose Urine 05/26/2022 Negative  Negative mg/dL Final     Bilirubin Urine 05/26/2022 Negative  Negative Final      Ketones Urine 05/26/2022 Negative  Negative mg/dL Final     Specific Gravity Urine 05/26/2022 1.015  1.003 - 1.035 Final     Blood Urine 05/26/2022 Negative  Negative Final     pH Urine 05/26/2022 6.0  5.0 - 7.0 Final     Protein Albumin Urine 05/26/2022 Negative  Negative mg/dL Final     Urobilinogen Urine 05/26/2022 Normal  Normal, 2.0 mg/dL Final     Nitrite Urine 05/26/2022 Negative  Negative Final     Leukocyte Esterase Urine 05/26/2022 Negative  Negative Final     Mucus Urine 05/26/2022 Present (A) None Seen /LPF Final     RBC Urine 05/26/2022 1  <=2 /HPF Final     WBC Urine 05/26/2022 1  <=5 /HPF Final     Squamous Epithelials Urine 05/26/2022 3 (A) <=1 /HPF Final     Total Protein Random Urine g/L 05/26/2022 0.12  g/L Final    The reference range has not been established for total protein in random urine samples.  The result should be integrated into the clinical context for interpretation.     Total Protein Urine g/gr Creatinine 05/26/2022 0.08  0.00 - 0.20 g/g Cr Final     Creatinine Urine mg/dL 05/26/2022 152  mg/dL Final     Vitamin B12 05/26/2022 649  193 - 986 pg/mL Final     WBC Count 05/26/2022 6.1  4.0 - 11.0 10e3/uL Final     RBC Count 05/26/2022 4.43  3.80 - 5.20 10e6/uL Final     Hemoglobin 05/26/2022 13.3  11.7 - 15.7 g/dL Final     Hematocrit 05/26/2022 39.3  35.0 - 47.0 % Final     MCV 05/26/2022 89  78 - 100 fL Final     MCH 05/26/2022 30.0  26.5 - 33.0 pg Final     MCHC 05/26/2022 33.8  31.5 - 36.5 g/dL Final     RDW 05/26/2022 12.1  10.0 - 15.0 % Final     Platelet Count 05/26/2022 271  150 - 450 10e3/uL Final     % Neutrophils 05/26/2022 59  % Final     % Lymphocytes 05/26/2022 31  % Final     % Monocytes 05/26/2022 9  % Final     % Eosinophils 05/26/2022 1  % Final     % Basophils 05/26/2022 0  % Final     % Immature Granulocytes 05/26/2022 0  % Final     NRBCs per 100 WBC 05/26/2022 0  <1 /100 Final     Absolute Neutrophils 05/26/2022 3.7  1.6 - 8.3 10e3/uL Final     Absolute  Lymphocytes 05/26/2022 1.9  0.8 - 5.3 10e3/uL Final     Absolute Monocytes 05/26/2022 0.5  0.0 - 1.3 10e3/uL Final     Absolute Eosinophils 05/26/2022 0.0  0.0 - 0.7 10e3/uL Final     Absolute Basophils 05/26/2022 0.0  0.0 - 0.2 10e3/uL Final     Absolute Immature Granulocytes 05/26/2022 0.0  <=0.4 10e3/uL Final     Absolute NRBCs 05/26/2022 0.0  10e3/uL Final              Assessment:     Lorena is a 21 year old female with systemic lupus erythematosus. She is treated with mycophenolate and hydroxychloroquine.  The disease is under good control. Therefore we will continue current management.          Plan:     1. Monitoring labs were obtained today.   2. Continue current therapies.  3. I recommend annual eye exams while on hydroxychlorquine (Plaquenil).   4. Return in about 3 months (around 8/26/2022). Call sooner with any concerns.     If there are any new questions or concerns, I would be glad to help and can be reached through our main office at [phone] or our paging  at [phone].    20 minutes spent on the date of the encounter doing chart review, history and exam, documentation and further activities as noted above.     Petra Will MD  Pediatric Rheumatology  [facility]

## 2022-05-26 NOTE — PATIENT INSTRUCTIONS
For Patient Education Materials:  z.[facility]/jacky       [facility] Pediatric Rheumatology    For Help:  The Pediatric Call Center at [phone] can help with scheduling of routine follow up visits.  Yana Huerta and Alcira Roe are the Nurse Coordinators for the Division of Pediatric Rheumatology and can be reached by phone at [phone] or through [email or web address]). They can help with questions about your child s rheumatic condition, medications, and test results.  For emergencies after hours or on the weekends, please call the page  at [phone] and ask to speak to the physician on-call for Pediatric Rheumatology. Please do not use [employer] for urgent requests.  Main  Services:  [phone]  Hmong/Togolese/South Sudanese: [phone]  Eritrean: [phone]  Citizen of [place]: [phone]    Internal Referrals: If we refer your child to another physician/team within [facility], you should receive a call to set this up. If you do not hear anything within a week, please call the Call Center at [phone].    External Referrals: If we refer your child to a physician/team outside of [facility], our team will send the referral order and relevant records to them. We ask that you call the place where your child is being referred to ensure they received the needed information and notify our team coordinators if not.    Imaging: If your child needs an imaging study that is not being performed the day of your clinic appointment, please call to set this up. For xrays, ultrasounds, and echocardiogram call [phone]. For CT or MRI call [phone].     MyChart: We encourage you to sign up for [employer] at [email or web address]. For assistance or questions, call [phone]. If your child is 12 years or older, a consent for proxy/parent access needs to be signed so please discuss this with your physician at the next visit.

## 2022-05-26 NOTE — NURSING NOTE
Peds Outpatient BP  1) Rested for 5 minutes, BP taken on bare arm, patient sitting (or supine for infants) w/ legs uncrossed?   Yes  2) Right arm used?  Right arm   Yes  3) Arm circumference of largest part of upper arm (in cm): 28.5  4) BP cuff sized used: Adult (25-32cm)   If used different size cuff then what was recommended why? N/A  5) First BP reading:machine   BP Readings from Last 1 Encounters:   05/26/22 120/80      Is reading >90%?No   (90% for <1 years is 90/50)  (90% for >18 years is 140/90)  *If a machine BP is at or above 90% take manual BP  6) Manual BP reading: N/A  7) Other comments: None    Zoie Angel CMA.

## 2022-05-26 NOTE — PROGRESS NOTES
Medications:   As of completion of this visit:  Current Outpatient Medications   Medication Sig Dispense Refill     nabumetone (RELAFEN) 500 MG tablet Take 2 tablets (1,000 mg) by mouth daily 180 tablet 2     co-enzyme Q-10 100 MG CAPS capsule Take by mouth daily       Cobalamine Combinations (B12 FOLATE PO)  (Patient not taking: Reported on 12/16/2021)       escitalopram (LEXAPRO) 5 MG tablet        Ferrous Sulfate (SLOW FE PO)  (Patient not taking: Reported on 3/3/2022)       hydroxychloroquine (PLAQUENIL) 200 MG tablet Take 1 tablet (200 mg) by mouth daily For systemic lupus erythematosus diaganosis 90 tablet 1     levonorgestrel (KYLEENA) 19.5 MG IUD Kyleena 17.5 mcg/24 hrs (5yrs) 19.5mg intrauterine device   Take 1 device by intrauterine route.       mycophenolate (GENERIC EQUIVALENT) 500 MG tablet Take 2 tablets(1000 mg)in the a.m. And 3 tablets (1500 mg) in the p.m. 450 tablet 0     pantoprazole (PROTONIX) 40 MG EC tablet Take 1 tablet (40 mg) by mouth daily 90 tablet 1     vitamin D3 (CHOLECALCIFEROL) 2000 units (50 mcg) tablet Take 2,000 Units by mouth daily       Prescribed medications have been administered regularly, without missed doses, and the medications have been tolerated well, without side effects.         Allergies:     Allergies   Allergen Reactions     Cats Itching         Problem list:     Patient Active Problem List    Diagnosis Date Noted     Proteinuria 01/10/2018     Priority: High     Depression, unspecified depression type 03/04/2022     Priority: Medium     Other systemic lupus erythematosus with other organ involvement (H) 07/31/2017     Priority: Medium     Anxiety 09/26/2016     Priority: Medium     Systemic lupus erythematosus (H) 09/10/2016     Priority: Medium     Arthritis (now improved), positive dsDNA, hypocomplementemia, Leydi positive (now negative).     No nephritis, ECHO and PFTs within normal limits    [facility]    Brain MRI essentially normal but suboptimal evaluation due to braces. Anti-neuronal antibody and ribosomal P antibodies negative.        Chronic abdominal pain 09/10/2016     Priority: Medium          Subjective:     Lorena is a 21 year old female who was seen in Pediatric Rheumatology clinic today for follow up of systemic lupus erythematosus (SLE). Lorena is unaccompanied today. At the last visit 3 months ago, her lupus was under control, but she was having ongoing joint pain so we restarted nabumetone.     Since that time she has been doing very well. Her joints have been hurting but she thinks it might be from traveling and bad weather. Nabumetone helps a lot, but she often only remembers to take it in the morning. She has not had other symptoms concerning for lupus. She is still having night sweats. She's not overall worried about it, but mentioned it to me.     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     Lexapro is helping a lot with her mood.       Brother currently has COVID.     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 Has not been working the last month. She starts at [employer] in the Fall and is excited about this.     A 14-point review of systems was negative except as follows: fatigue, night sweats, change in sleep patterns, nausea, numbness/tingling of fingertips off and on, anxiety, depression.            Examination:   Blood pressure 120/80, pulse 100, temperature 99.6  F (37.6  C), temperature source Tympanic, resp. rate 16, height 1.592 m (5' 2.68\"), weight 65.6 kg (144 lb 10 oz).  75 %ile (Z= 0.68) based on CDC (Girls, 2-20 Years) weight-for-age data using vitals from 5/26/2022.  Blood pressure percentiles are not available for patients who are 18 years or older.  Gen: Pleasant, well-appearing, NAD  HEENT/Neck: TM's clear bilaterally, oropharynx is clear without lesions, neck is supple with no lymphadenopathy                  CV: " Regular rate and rhythm, normal S1, S2, no murmurs  Resp: Clear to ascultation bilaterally  Abd: Soft, non-tender, non-distended, no hepatosplenomegaly  Skin: Clear, there is no rash  MSK: All joints were examined including TMJ, sternoclavicular, acromioclavicular, neck, shoulder, elbow, wrist, hips, knees, ankles, fingers, and toes, and all were normal          Last Lab Results:     Office Visit on 05/26/2022   Component Date Value Ref Range Status     C3 Complement 05/26/2022 114  81 - 157 mg/dL Final     C4 Complement 05/26/2022 19  13 - 39 mg/dL Final     Creatinine 05/26/2022 0.52  0.50 - 1.00 mg/dL Final     GFR Estimate 05/26/2022 >90  >60 mL/min/1.73m2 Final    Effective December 21, 2021 eGFRcr in adults is calculated using the 2021 CKD-EPI creatinine equation which includes age and gender (Constantine et al., [facility], DOI: 10.1056/EYMZbh4287476)     CRP Inflammation 05/26/2022 <2.9  0.0 - 8.0 mg/L Final     DNA (ds) Antibody 05/26/2022 40.0 (A) <10.0 IU/mL Final    Positive     Bilirubin Total 05/26/2022 0.5  0.2 - 1.3 mg/dL Final     Bilirubin Direct 05/26/2022 0.1  0.0 - 0.2 mg/dL Final     Protein Total 05/26/2022 7.8  6.8 - 8.8 g/dL Final     Albumin 05/26/2022 3.9  3.4 - 5.0 g/dL Final     Alkaline Phosphatase 05/26/2022 80  40 - 150 U/L Final     AST 05/26/2022 18  0 - 35 U/L Final     ALT 05/26/2022 26  0 - 50 U/L Final     Color Urine 05/26/2022 Light Yellow  Colorless, Straw, Light Yellow, Yellow Final     Appearance Urine 05/26/2022 Clear  Clear Final     Glucose Urine 05/26/2022 Negative  Negative mg/dL Final     Bilirubin Urine 05/26/2022 Negative  Negative Final     Ketones Urine 05/26/2022 Negative  Negative mg/dL Final     Specific Gravity Urine 05/26/2022 1.015  1.003 - 1.035 Final     Blood Urine 05/26/2022 Negative  Negative Final     pH Urine 05/26/2022 6.0  5.0 - 7.0 Final     Protein Albumin Urine 05/26/2022 Negative  Negative mg/dL Final     Urobilinogen Urine 05/26/2022 Normal  Normal, 2.0  mg/dL Final     Nitrite Urine 05/26/2022 Negative  Negative Final     Leukocyte Esterase Urine 05/26/2022 Negative  Negative Final     Mucus Urine 05/26/2022 Present (A) None Seen /LPF Final     RBC Urine 05/26/2022 1  <=2 /HPF Final     WBC Urine 05/26/2022 1  <=5 /HPF Final     Squamous Epithelials Urine 05/26/2022 3 (A) <=1 /HPF Final     Total Protein Random Urine g/L 05/26/2022 0.12  g/L Final    The reference range has not been established for total protein in random urine samples.  The result should be integrated into the clinical context for interpretation.     Total Protein Urine g/gr Creatinine 05/26/2022 0.08  0.00 - 0.20 g/g Cr Final     Creatinine Urine mg/dL 05/26/2022 152  mg/dL Final     Vitamin B12 05/26/2022 649  193 - 986 pg/mL Final     WBC Count 05/26/2022 6.1  4.0 - 11.0 10e3/uL Final     RBC Count 05/26/2022 4.43  3.80 - 5.20 10e6/uL Final     Hemoglobin 05/26/2022 13.3  11.7 - 15.7 g/dL Final     Hematocrit 05/26/2022 39.3  35.0 - 47.0 % Final     MCV 05/26/2022 89  78 - 100 fL Final     MCH 05/26/2022 30.0  26.5 - 33.0 pg Final     MCHC 05/26/2022 33.8  31.5 - 36.5 g/dL Final     RDW 05/26/2022 12.1  10.0 - 15.0 % Final     Platelet Count 05/26/2022 271  150 - 450 10e3/uL Final     % Neutrophils 05/26/2022 59  % Final     % Lymphocytes 05/26/2022 31  % Final     % Monocytes 05/26/2022 9  % Final     % Eosinophils 05/26/2022 1  % Final     % Basophils 05/26/2022 0  % Final     % Immature Granulocytes 05/26/2022 0  % Final     NRBCs per 100 WBC 05/26/2022 0  <1 /100 Final     Absolute Neutrophils 05/26/2022 3.7  1.6 - 8.3 10e3/uL Final     Absolute Lymphocytes 05/26/2022 1.9  0.8 - 5.3 10e3/uL Final     Absolute Monocytes 05/26/2022 0.5  0.0 - 1.3 10e3/uL Final     Absolute Eosinophils 05/26/2022 0.0  0.0 - 0.7 10e3/uL Final     Absolute Basophils 05/26/2022 0.0  0.0 - 0.2 10e3/uL Final     Absolute Immature Granulocytes 05/26/2022 0.0  <=0.4 10e3/uL Final     Absolute NRBCs 05/26/2022 0.0   10e3/uL Final              Assessment:     Lorena is a 21 year old female with systemic lupus erythematosus. She is treated with mycophenolate and hydroxychloroquine.  The disease is under good control. Therefore we will continue current management.          Plan:     1. Monitoring labs were obtained today.   2. Continue current therapies.  3. I recommend annual eye exams while on hydroxychlorquine (Plaquenil).   4. Return in about 3 months (around 8/26/2022). Call sooner with any concerns.     If there are any new questions or concerns, I would be glad to help and can be reached through our main office at [phone] or our paging  at [phone].    20 minutes spent on the date of the encounter doing chart review, history and exam, documentation and further activities as noted above.     Petra Will MD  Pediatric Rheumatology  [facility]

## 2022-05-26 NOTE — NURSING NOTE
"Chief Complaint   Patient presents with     Arthritis     Lupus.     Vitals:    05/26/22 1014   BP: 120/80   BP Location: Right arm   Patient Position: Chair   Pulse: 100   Resp: 16   Temp: 99.6  F (37.6  C)   TempSrc: Tympanic   Weight: 144 lb 10 oz (65.6 kg)   Height: 5' 2.68\" (159.2 cm)           Zoie Angel M.A.    May 26, 2022  "

## 2022-05-27 LAB
C3 SERPL-MCNC: 114 MG/DL (ref 81–157)
C4 SERPL-MCNC: 19 MG/DL (ref 13–39)

## 2022-06-01 LAB
DSDNA AB SER-ACNC: 40 IU/ML
UBIQUINONE10 SERPL-MCNC: 1.8 MG/L

## 2022-07-27 NOTE — ED AVS SNAPSHOT
[facility] Emergency Department    [address]    [facility] [address]    Phone:  [phone]                                       Lorena Fritz   MRN: [id number]    Department:  [facility] Emergency Department   Date of Visit:  8/19/2017           After Visit Summary Signature Page     I have received my discharge instructions, and my questions have been answered. I have discussed any challenges I see with this plan with the nurse or doctor.    ..........................................................................................................................................  Patient/Patient Representative Signature      ..........................................................................................................................................  Patient Representative Print Name and Relationship to Patient    ..................................................               ................................................  Date                                            Time    ..........................................................................................................................................  Reviewed by Signature/Title    ...................................................              ..............................................  Date                                                            Time           [facility] Medicine  History & Physical    Patient Name: Tee Aranda  MRN: [id number]  Patient Class: IP- Inpatient  Admission Date: 7/26/2022  Attending Physician: Chris Kenny MD   Primary Care Provider: James Samano MD         Patient information was obtained from patient, past medical records and ER records.     Subjective:     Principal Problem:GI bleed    Chief Complaint: No chief complaint on file.       HPI: 56 y.o. female with PMHx of thrombocytopenia, cirrhosis with esophageal varices (prior banding), previous GI bleeds and ascites presented to the [facility] ED on 7/26/22 with black stools first noticed this AM. No bright red blood or hematemesis. Hemodynamically stable.    In the ED, pt received IV normal saline, Protonix and octreotide infusions, and Rocephin. Pt COVID-19 negative. UA showed 2+ blood, 20 RBC, 2 WBC. Troponin negative, CPK 88. Sodium 142, potassium 3.7, chloride 108, CO2 24, BUN 17, creatinine 0.8, glucose 142. AST 28, ALT 34. INR 1.2, WBC 3.04, hemoglobin 13.2, hematocrit 39.8, platelets 68. Abdominal x-ray noted no evidence of bowel obstruction or perforation. EKG showed sinus bradycardia (ventricular rate 59), otherwise normal EKG. Transfer center contacted Gastroenterology at [facility] for further tx of GI bleed. [facility] consulted for hospital admission and management of GI bleed.      Past Medical History:   Diagnosis Date    Anxiety     Arthritis     Ascites 11/23/2020    AVN (avascular necrosis of bone)     Cataract     Edema 11/23/2020    Esophageal varices     Glaucoma     Hepatic encephalopathy 11/23/2020    Hx of cirrhosis     non-alcoholic    Kidney stones        Past Surgical History:   Procedure Laterality Date    DISTAL CLAVICLE EXCISION Right 11/18/2021    Procedure: RIGHT SHOULDER ARTHROSCOPY, EXCISION, CLAVICLE, DISTAL; EXTENSIVE DEBRIDEMENT;  Surgeon: Isaiah Joyner MD;  Location: [facility] OR;  Service:  Orthopedics;  Laterality: Right;    ESOPHAGOGASTRODUODENOSCOPY N/A 11/10/2020    Procedure: EGD (ESOPHAGOGASTRODUODENOSCOPY);  Surgeon: Gerard Salinas MD;  Location: [facility] ENDO;  Service: Endoscopy;  Laterality: N/A;    ESOPHAGOGASTRODUODENOSCOPY N/A 12/28/2020    Procedure: EGD (ESOPHAGOGASTRODUODENOSCOPY);  Surgeon: Adryan Park MD;  Location: [facility] (2ND FLR);  Service: Endoscopy;  Laterality: N/A;    ESOPHAGOGASTRODUODENOSCOPY N/A 2/15/2021    Procedure: EGD (ESOPHAGOGASTRODUODENOSCOPY);  Surgeon: Hari Greene MD;  Location: [facility] (4TH FLR);  Service: Endoscopy;  Laterality: N/A;  6 week follow-up variceal banding  Hx varices - Labs - ERW  prep ins. emialed - COVID screening on 2/12/21 [place] - ERW    ESOPHAGOGASTRODUODENOSCOPY Left 8/3/2021    Procedure: EGD (ESOPHAGOGASTRODUODENOSCOPY);  Surgeon: Fabircio Corado MD;  Location: [facility] (4TH FLR);  Service: Gastroenterology;  Laterality: Left;  recent hematemasis. varices-labs on 7/26    COVID test at [facility] on 7/31-GT  requesting sooner    HYSTERECTOMY      KNEE SURGERY      LITHOTRIPSY      RHINOPLASTY TIP      TONSILLECTOMY      TOTAL HIP ARTHROPLASTY         Review of patient's allergies indicates:   Allergen Reactions    Sulfa (sulfonamide antibiotics) Hives       Current Facility-Administered Medications on File Prior to Encounter   Medication    [COMPLETED] 0.9%  NaCl infusion    [COMPLETED] cefTRIAXone (ROCEPHIN) 2 g/50 mL D5W IVPB    [COMPLETED] pantoprazole 40 mg in  mL infusion (ready to mix system)    [COMPLETED] pantoprazole injection 80 mg    [DISCONTINUED] octreotide (SANDOSTATIN) 500 mcg in sodium chloride 0.9% 100 mL infusion     Current Outpatient Medications on File Prior to Encounter   Medication Sig    carvediloL (COREG) 3.125 MG tablet Take 1 tablet (3.125 mg total) by mouth 2 (two) times daily.    multivitamin (THERAGRAN) per tablet Take 1 tablet by mouth once daily.    omega-3 fatty  acids/fish oil (FISH OIL-OMEGA-3 FATTY ACIDS) 300-1,000 mg capsule Take 1 capsule by mouth once daily.    pantoprazole (PROTONIX) 40 MG tablet Take 1 tablet (40 mg total) by mouth once daily.    rifAXIMin (XIFAXAN) 550 mg Tab Take 1 tablet (550 mg total) by mouth 2 (two) times daily. (Patient taking differently: Take 550 mg by mouth once daily.)    furosemide (LASIX) 20 MG tablet Take 1 tablet (20 mg total) by mouth once daily. Take in morning (Patient taking differently: Take 20 mg by mouth daily as needed.)    lactulose (CEPHULAC) 10 gram packet Take 10 g by mouth 3 (three) times daily as needed.    ondansetron (ZOFRAN) 8 MG tablet Take 1 tablet (8 mg total) by mouth every 8 (eight) hours as needed for Nausea.    spironolactone (ALDACTONE) 25 MG tablet Take 2 tablets (50 mg total) by mouth once daily. (Patient taking differently: Take 50 mg by mouth daily as needed.)     Family History       Problem Relation (Age of Onset)    Diabetes Mellitus Maternal Grandmother          Tobacco Use    Smoking status: Current Some Day Smoker     Packs/day: 0.25     Types: Cigarettes     Last attempt to quit: 7/3/2019     Years since quitting: 3.0    Smokeless tobacco: Never Used   Substance and Sexual Activity    Alcohol use: Not Currently    Drug use: No    Sexual activity: Not on file     Review of Systems   Constitutional:  Positive for fatigue. Negative for activity change and fever.   HENT:  Negative for congestion and sneezing.    Eyes:  Negative for pain and itching.   Respiratory:  Negative for apnea, chest tightness, shortness of breath and wheezing.    Cardiovascular:  Negative for chest pain, palpitations and leg swelling.   Gastrointestinal:  Positive for abdominal pain and blood in stool. Negative for abdominal distention, constipation, diarrhea, nausea and vomiting.   Endocrine: Negative for polyphagia and polyuria.   Genitourinary:  Negative for difficulty urinating, dysuria, hematuria and pelvic pain.    Musculoskeletal:  Negative for arthralgias and joint swelling.   Skin:  Negative for pallor and rash.   Neurological:  Positive for headaches. Negative for dizziness, syncope and weakness.   Psychiatric/Behavioral:  Negative for agitation and confusion. The patient is not nervous/anxious.      Objective:     Vital Signs (Most Recent):  Pulse: 65 (07/26/22 1931)  Resp: 18 (07/26/22 1629)  BP: 131/63 (07/26/22 1931)  SpO2: 97 % (07/26/22 1931) Vital Signs (24h Range):  Temp:  [96.7 °F (35.9 °C)] 96.7 °F (35.9 °C)  Pulse:  [60-65] 65  Resp:  [18-20] 18  SpO2:  [96 %-100 %] 97 %  BP: (131-152)/(60-82) 131/63        There is no height or weight on file to calculate BMI.    Physical Exam  Constitutional:       General: She is not in acute distress.     Appearance: Normal appearance. She is normal weight. She is not ill-appearing.   HENT:      Head: Normocephalic and atraumatic.   Cardiovascular:      Rate and Rhythm: Normal rate and regular rhythm.      Pulses: Normal pulses.      Heart sounds: Normal heart sounds.   Pulmonary:      Effort: Pulmonary effort is normal.      Breath sounds: Normal breath sounds. No wheezing or rales.   Abdominal:      General: Bowel sounds are normal. There is no distension.      Palpations: There is no mass.      Tenderness: There is abdominal tenderness. There is no guarding.      Comments: Epigastric and RUQ tenderness to palpation   Musculoskeletal:         General: No swelling.   Skin:     Coloration: Skin is not jaundiced.      Findings: No bruising.   Neurological:      General: No focal deficit present.      Mental Status: She is alert and oriented to person, place, and time.      Motor: No weakness.   Psychiatric:         Mood and Affect: Mood normal.         Behavior: Behavior normal.         Thought Content: Thought content normal.           Significant Labs: All pertinent labs within the past 24 hours have been reviewed.    Bilirubin:   Recent Labs   Lab 06/27/22  1004  07/26/22  0747   BILITOT 0.5 0.8     CBC:   Recent Labs   Lab 07/26/22  0748 07/26/22  1709   WBC 3.04* 3.54*   HGB 13.2 12.6   HCT 39.8 37.2   PLT 68* 67*     CMP:   Recent Labs   Lab 07/26/22  0747      K 3.7      CO2 24   *   BUN 17   CREATININE 0.8   CALCIUM 9.3   PROT 7.1   ALBUMIN 4.2   BILITOT 0.8   ALKPHOS 98   AST 28   ALT 34   ANIONGAP 10   EGFRNONAA >60     Coagulation:   Recent Labs   Lab 07/26/22  0748   INR 1.2   APTT 27.3     POCT Glucose:   Recent Labs   Lab 07/26/22  1931   POCTGLUCOSE 145*     Troponin:   Recent Labs   Lab 07/26/22  0747   TROPONINI <0.006     Urine Studies:   Recent Labs   Lab 07/26/22  0828   COLORU Yellow   APPEARANCEUA Clear   PHUR 7.0   SPECGRAV 1.015   PROTEINUA Negative   GLUCUA Negative   KETONESU Negative   BILIRUBINUA Negative   OCCULTUA 2+*   NITRITE Negative   UROBILINOGEN Negative   LEUKOCYTESUR Negative   RBCUA 20*   WBCUA 2   BACTERIA Rare       Significant Imaging: I have reviewed all pertinent imaging results/findings within the past 24 hours.      Assessment/Plan:     * GI bleed  Black tarry stool on 7/26 AM, positive occult blood  R-sided and epigastric abdominal pain  H/H was 13/39    Plan:  - GI consult; appreciate recs  - NPO at midnight for EGD on 7/27  - IV Protonix BID  - Octreotide  - Rocephin  - Trend H&H q6h  - Anticoagulation / Lovenox withheld    Liver cirrhosis secondary to CARTAGENA  MELD score 8  Liver enzymes wnl  Pt had recent appt with hepatology; reports improvement in MELD score  Stable    Plan:  - Daily CMP  - Outpatient f/u with hepatology      VTE Risk Mitigation (From admission, onward)         Ordered     IP VTE LOW RISK PATIENT  Once         07/26/22 1707     Place sequential compression device  Until discontinued         07/26/22 1707                   Nevaeh Rhoades MD  Department of Hospital Medicine   [facility]

## 2022-08-08 DIAGNOSIS — M32.9 SYSTEMIC LUPUS ERYTHEMATOSUS, UNSPECIFIED SLE TYPE, UNSPECIFIED ORGAN INVOLVEMENT STATUS (H): Primary | ICD-10-CM

## 2022-08-08 RX ORDER — PREDNISONE 20 MG/1
20 TABLET ORAL DAILY
Qty: 5 TABLET | Refills: 1 | Status: SHIPPED | OUTPATIENT
Start: 2022-08-08 | End: 2022-08-13

## 2022-08-11 ENCOUNTER — TELEPHONE (OUTPATIENT)
Dept: RHEUMATOLOGY | Facility: CLINIC | Age: 22
End: 2022-08-11

## 2022-08-11 NOTE — TELEPHONE ENCOUNTER
Pediatric Rheumatology Phone Consult    Caller: Lorena Fritz  Location: [place], [place]  Date: 08/11/22  Time: 5:45pm  Callback number: [phone]    Question/Discussion:  Lorena is a 21 year old female with SLE (no history of renal involvement) on Cellcept who follows with Dr. Will who is calling in today due to concerns over low grade fever and illness symptoms.     Lorena was feeling fine yesterday, but woke up at 3 am with sore throat. Throughout the day today she has had congestion and whole body pain. She is generally feeling very unwell. She has been checking her temperature hourly which has remained at around 100 degrees despite Tylenol use. She is calling in to rheumatology to request advice over next steps.     She has had diarrhea yesterday and today as well as decreased appetite. She has no vomiting. No respiratory symptoms such as cough or difficulty breathing. No urinary symptoms. No joint swelling. She did develop hives earlier in the week which happens occasionally to her, but they have resolved today. No other rashes. No clear sick contacts. She took a home COVID test today and was negative.    Generally, she has had more joint pain lately. She has not had labs done since 5/26, but at that time her lupus activity appeared stable and she did not have any signs of developing nephritis or other end organ damage. She did not start the prednisone prescribed by Dr. Will on 8/8 for joint pain due to concerns about side effects. She is taking her Cellcept as prescribed.    She is currently in [place], [place] and would need to drive to the [facility] in [place] to obtain medical care this evening.    Recommendations: Based on the limited information provided on the phone by Lorena, I advised that given her fever in the setting of lupus and immunosuppression it would be prudent to be evaluated by a medical provider this evening. Her symptoms do sound consistent with a possible viral infection, but in  this clinical setting an in-person evaluation is warranted. I advised that if she were to present to an urgent care or ER that they should check the following labs: CBC with platelets and differential, complete metabolic panel, CRP, double stranded DNA, C3 complement, C4 complement, urinalysis with microscopy and reflex to culture, blood culture, COVID testing, influenza testing.     For now, I advised that Lorena should continue to take her Cellcept and Plaquenil.    Lorena expressed understanding of this advice. I asked her to send a globa[employer]ly message with an update later tonight or tomorrow. I will also check her chart tomorrow and if no update has been provided by the patient will ensure that the patient is contacted by a member of our team as Dr. Will is currently out of the office.     Discussed with pediatric rheumatology faculty, Dr. Aguilera.     Alison M. Lerman, MD  Adult and Pediatric Rheumatology Fellow    I reviewed the phone call and documentation and agree with the above documented by the fellow.    Dolores Aguilera M.D.   of Pediatrics  Pediatric Rheumatology

## 2022-08-16 ENCOUNTER — APPOINTMENT (OUTPATIENT)
Dept: LAB | Facility: CLINIC | Age: 22
End: 2022-08-16
Attending: INTERNAL MEDICINE
Payer: COMMERCIAL

## 2022-08-16 LAB
ALBUMIN SERPL-MCNC: 3.5 G/DL (ref 3.4–5)
ALP SERPL-CCNC: 84 U/L (ref 40–150)
ALT SERPL W P-5'-P-CCNC: 27 U/L (ref 0–50)
AST SERPL W P-5'-P-CCNC: 21 U/L (ref 0–35)
BASOPHILS # BLD AUTO: 0 10E3/UL (ref 0–0.2)
BASOPHILS NFR BLD AUTO: 0 %
BILIRUB DIRECT SERPL-MCNC: <0.1 MG/DL (ref 0–0.2)
BILIRUB SERPL-MCNC: 0.4 MG/DL (ref 0.2–1.3)
CREAT SERPL-MCNC: 0.53 MG/DL (ref 0.5–1)
CRP SERPL-MCNC: 17 MG/L (ref 0–8)
EOSINOPHIL # BLD AUTO: 0.1 10E3/UL (ref 0–0.7)
EOSINOPHIL NFR BLD AUTO: 1 %
ERYTHROCYTE [DISTWIDTH] IN BLOOD BY AUTOMATED COUNT: 12.1 % (ref 10–15)
GFR SERPL CREATININE-BSD FRML MDRD: >90 ML/MIN/1.73M2
HCT VFR BLD AUTO: 39.8 % (ref 35–47)
HGB BLD-MCNC: 13.4 G/DL (ref 11.7–15.7)
IMM GRANULOCYTES # BLD: 0.1 10E3/UL
IMM GRANULOCYTES NFR BLD: 2 %
LYMPHOCYTES # BLD AUTO: 2.3 10E3/UL (ref 0.8–5.3)
LYMPHOCYTES NFR BLD AUTO: 33 %
MCH RBC QN AUTO: 30.2 PG (ref 26.5–33)
MCHC RBC AUTO-ENTMCNC: 33.7 G/DL (ref 31.5–36.5)
MCV RBC AUTO: 90 FL (ref 78–100)
MONOCYTES # BLD AUTO: 0.6 10E3/UL (ref 0–1.3)
MONOCYTES NFR BLD AUTO: 8 %
NEUTROPHILS # BLD AUTO: 3.9 10E3/UL (ref 1.6–8.3)
NEUTROPHILS NFR BLD AUTO: 56 %
NRBC # BLD AUTO: 0 10E3/UL
NRBC BLD AUTO-RTO: 0 /100
PLAT MORPH BLD: NORMAL
PLATELET # BLD AUTO: 294 10E3/UL (ref 150–450)
PROT SERPL-MCNC: 8 G/DL (ref 6.8–8.8)
RBC # BLD AUTO: 4.43 10E6/UL (ref 3.8–5.2)
RBC MORPH BLD: NORMAL
WBC # BLD AUTO: 6.9 10E3/UL (ref 4–11)

## 2022-08-16 PROCEDURE — 86140 C-REACTIVE PROTEIN: CPT | Performed by: INTERNAL MEDICINE

## 2022-08-16 PROCEDURE — 82565 ASSAY OF CREATININE: CPT | Performed by: INTERNAL MEDICINE

## 2022-08-16 PROCEDURE — 85004 AUTOMATED DIFF WBC COUNT: CPT | Performed by: INTERNAL MEDICINE

## 2022-08-16 PROCEDURE — 80076 HEPATIC FUNCTION PANEL: CPT | Performed by: INTERNAL MEDICINE

## 2022-08-16 PROCEDURE — 86225 DNA ANTIBODY NATIVE: CPT | Performed by: INTERNAL MEDICINE

## 2022-08-16 PROCEDURE — 86160 COMPLEMENT ANTIGEN: CPT | Performed by: INTERNAL MEDICINE

## 2022-08-16 PROCEDURE — 36415 COLL VENOUS BLD VENIPUNCTURE: CPT | Performed by: INTERNAL MEDICINE

## 2022-08-17 ENCOUNTER — TRANSFERRED RECORDS (OUTPATIENT)
Dept: HEALTH INFORMATION MANAGEMENT | Facility: CLINIC | Age: 22
End: 2022-08-17

## 2022-08-17 LAB
C3 SERPL-MCNC: 137 MG/DL (ref 81–157)
C4 SERPL-MCNC: 23 MG/DL (ref 13–39)
DSDNA AB SER-ACNC: 44 IU/ML

## 2022-08-18 ENCOUNTER — OFFICE VISIT (OUTPATIENT)
Dept: CARDIOLOGY | Facility: CLINIC | Age: 22
End: 2022-08-18
Payer: COMMERCIAL

## 2022-08-18 VITALS
RESPIRATION RATE: 16 BRPM | SYSTOLIC BLOOD PRESSURE: 96 MMHG | BODY MASS INDEX: 26.31 KG/M2 | WEIGHT: 147 LBS | DIASTOLIC BLOOD PRESSURE: 58 MMHG | HEART RATE: 96 BPM | OXYGEN SATURATION: 97 %

## 2022-08-18 DIAGNOSIS — R42 DIZZINESS: Primary | ICD-10-CM

## 2022-08-18 DIAGNOSIS — M32.9 SYSTEMIC LUPUS ERYTHEMATOSUS, UNSPECIFIED SLE TYPE, UNSPECIFIED ORGAN INVOLVEMENT STATUS (H): ICD-10-CM

## 2022-08-18 PROCEDURE — 99204 OFFICE O/P NEW MOD 45 MIN: CPT | Performed by: INTERNAL MEDICINE

## 2022-08-18 PROCEDURE — 93000 ELECTROCARDIOGRAM COMPLETE: CPT | Performed by: INTERNAL MEDICINE

## 2022-08-18 RX ORDER — ONDANSETRON 4 MG/1
TABLET, ORALLY DISINTEGRATING ORAL PRN
COMMUNITY
Start: 2022-06-24 | End: 2024-08-15

## 2022-08-18 RX ORDER — HYDROXYZINE HYDROCHLORIDE 25 MG/1
TABLET, FILM COATED ORAL PRN
COMMUNITY
Start: 2022-05-24

## 2022-08-18 RX ORDER — PROCHLORPERAZINE MALEATE 5 MG
10 TABLET ORAL PRN
COMMUNITY
Start: 2022-06-24 | End: 2024-08-15

## 2022-08-18 RX ORDER — DIPHENHYDRAMINE HCL 25 MG
CAPSULE ORAL PRN
COMMUNITY

## 2022-08-18 NOTE — LETTER
8/18/2022    Juan Carlos Yun PA-C  [facility] [address]  [facility] [address]    RE: Lorena Fritz       Dear Colleague,     I had the pleasure of seeing Lorena Fritz in the [facility].      Click to link to [facility] HEART CARE NOTE    Thank you, Dr. Will, for asking me to see Lorena Fritz in consultation at [facility] to evaluate dizziness and nausea.      Assessment/Plan:   1.  Dizziness and nausea: The etiology is not clear.  It could be related to side effects of drugs for management of systemic lupus.  She had dizziness for several hours usually, never had syncopal episode.  She had no chest pain, but some shortness of breath with dizziness episode.  She never had dizziness and palpitations together.  She has no obvious viral or COVID infection 2 to 3 months ago before she had the symptoms dizziness and nausea.  Her ECG showed normal sinus rhythm, normal ECG.  Less likely, she has posterior orthostatic tachycardia syndrome.  More suspicious her symptoms are related to lupus and the side effects from her medications.  Echocardiogram is requested for evaluation of her heart function and structure due to systemic lupus.  Meantime, recommended hydrating her self well, using elastic support socks and increasing exercise activity.  She agreed with the plan.    Thank you for the opportunity to be involved in the care of Lorena Fritz. If you have any questions, please feel free to contact me.  I will see the patient again in 2 months and as needed    Much or all of the text in this note was generated through the use of Dragon Dictate voice-to-text software. Errors in spelling or words which seem out of context are unintentional.   Sound alike errors, in particular, may have escaped editing.       History of Present Illness:   It is my pleasure to see Lorena Fritz at the [facility] for  evaluation of dizziness and nausea. Lorena Fritz is a 21 year old female with a medical history of systemic lupus erythematosus, chronic abdominal pain, anxiety, depression.    The patient is referred to cardiology clinic for evaluation of dizziness, nausea.  She states that she developed dizziness and nausea over last 2 to 3 months.  She also complains of sometimes palpitations.  Her dizziness usually lasted for several hours.  She never had a syncopal episode.  She has been taking several medication for management of her systemic lupus.  She had no chest pain.  She had no orthopnea, PND or leg edema.  She has no fever chills or skin rash.  She complains some joint pain.  Her blood pressure and heart rate are controlled.    Her ECG in clinic today showed normal sinus rhythm, normal ECG.      Past Medical History:     Patient Active Problem List   Diagnosis     Systemic lupus erythematosus (H)     Chronic abdominal pain     Anxiety     Other systemic lupus erythematosus with other organ involvement (H)     Proteinuria     Depression, unspecified depression type       Past Surgical History:     Past Surgical History:   Procedure Laterality Date     ENDOSCOPY UPPER, COLONOSCOPY, COMBINED         Family History:     Family History   Problem Relation Age of Onset     Suicide Paternal Grandfather         Suicide in family members on both sides of family (great-uncle, mother's cousin, etc)     Osteoarthritis Paternal Grandmother      Ulcerative Colitis Other         Paternal great uncle and paternal great grandfather     Kidney Disease Other         Mom's side of the family. No one with kidney transplant, dialysis     Bone Spurs Maternal Grandmother      Macular Degeneration Maternal Grandfather      Other - See Comments Mother         PCOS, endometriosis     Arthritis Mother        Social History:    reports that she has been smoking. She has never used smokeless tobacco.    Review of Systems:   12 systems are reviewed  negative except for in HPI.    Meds:     Current Outpatient Medications:      AMOXICILLIN PO, Take 1 tablet by mouth 1 tab daily for 10 days, Disp: , Rfl:      coenzyme Q-10 75 MG CAPS, Take 75 mg by mouth daily, Disp: , Rfl:      diphenhydrAMINE (BENADRYL) 25 MG capsule, Take by mouth as needed, Disp: , Rfl:      escitalopram (LEXAPRO) 10 MG tablet, Take 10 mg by mouth daily, Disp: , Rfl:      hydroxychloroquine (PLAQUENIL) 200 MG tablet, Take 1 tablet (200 mg) by mouth daily For systemic lupus erythematosus diaganosis, Disp: 90 tablet, Rfl: 1     hydrOXYzine (ATARAX) 25 MG tablet, Take by mouth as needed, Disp: , Rfl:      levonorgestrel (KYLEENA) 19.5 MG IUD, Kyleena 17.5 mcg/24 hrs (5yrs) 19.5mg intrauterine device  Take 1 device by intrauterine route., Disp: , Rfl:      mycophenolate (GENERIC EQUIVALENT) 500 MG tablet, Take 2 tablets(1000 mg)in the a.m. And 3 tablets (1500 mg) in the p.m., Disp: 450 tablet, Rfl: 0     nabumetone (RELAFEN) 500 MG tablet, Take 2 tablets (1,000 mg) by mouth daily, Disp: 180 tablet, Rfl: 2     ondansetron (ZOFRAN ODT) 4 MG ODT tab, Take by mouth as needed, Disp: , Rfl:      pantoprazole (PROTONIX) 40 MG EC tablet, Take 1 tablet (40 mg) by mouth daily, Disp: 90 tablet, Rfl: 1     prochlorperazine (COMPAZINE) 5 MG tablet, Take 10 mg by mouth as needed, Disp: , Rfl:      vitamin B-12 (CYANOCOBALAMIN) 1000 MCG tablet, Take by mouth once a week, Disp: , Rfl:      vitamin D3 (CHOLECALCIFEROL) 2000 units (50 mcg) tablet, Take 2,000 Units by mouth daily, Disp: , Rfl:      Cobalamine Combinations (B12 FOLATE PO), , Disp: , Rfl:      Ferrous Sulfate (SLOW FE PO), , Disp: , Rfl:      Allergies:   Cats    Objective:      Physical Exam  66.7 kg (147 lb) (77 %, Z= 0.74, Source: [facility] (Girls, 2-20 Years))     Body mass index is 26.31 kg/m .  BP 96/58 (BP Location: Left arm, Patient Position: Sitting, Cuff Size: Adult Regular)   Pulse 96   Resp 16   Wt 66.7 kg (147 lb)   SpO2 97%   BMI 26.31  kg/m      General Appearance:   Awake, Alert, No acute distress.   HEENT:  Pupil equal, reactive to light. No scleral icterus; the mucous membranes were moist. No oral ulcers or thrush.    Neck: No cervical bruits. No JVD. No thyromegaly. No lymph node enlargement or tenderness.   Chest: The spine was straight. The chest was symmetric.   Lungs:   Respirations unlabored. Lungs are clear to auscultation. No crackles. No wheezing.   Cardiovascular:   RRR, normal first and second heart sounds with no murmurs. No rubs or gallops.    Abdomen:  Soft. No tenderness. Non-distended. Bowels sounds are present   Extremities: Equal posterior tibial pulses. No leg edema.   Skin: No rashes or ulcers. Warm, Dry.   Musculoskeletal: No tenderness. No deformity.   Neurologic: Mood and affect are appropriate. No focal deficits.         EKG:  Personally reivewed  Normal sinus rhythm  Normal ECG  No previous ECG for comparison    Lab Review   Lab Results   Component Value Date     08/19/2017    CO2 22 08/19/2017    BUN 12 08/19/2017     Lab Results   Component Value Date    WBC 6.9 08/16/2022    WBC 5.6 05/06/2021    HGB 13.4 08/16/2022    HGB 13.5 05/06/2021    HCT 39.8 08/16/2022    HCT 41.2 05/06/2021    MCV 90 08/16/2022    MCV 90 05/06/2021     08/16/2022     05/06/2021     Lab Results   Component Value Date    TSH 0.55 03/03/2022    TSH 0.74 04/18/2019                   Thank you for allowing me to participate in the care of your patient.      Sincerely,     Jim Nichole MD     [facility]  cc:   Petra Will MD  [address] [place] [phone]

## 2022-08-18 NOTE — PROGRESS NOTES
Click to link to [facility] HEART CARE NOTE    Thank you, Dr. Will, for asking me to see Lorena Fritz in consultation at [facility] to evaluate dizziness and nausea.      Assessment/Plan:   1.  Dizziness and nausea: The etiology is not clear.  It could be related to side effects of drugs for management of systemic lupus.  She had dizziness for several hours usually, never had syncopal episode.  She had no chest pain, but some shortness of breath with dizziness episode.  She never had dizziness and palpitations together.  She has no obvious viral or COVID infection 2 to 3 months ago before she had the symptoms dizziness and nausea.  Her ECG showed normal sinus rhythm, normal ECG.  Less likely, she has posterior orthostatic tachycardia syndrome.  More suspicious her symptoms are related to lupus and the side effects from her medications.  Echocardiogram is requested for evaluation of her heart function and structure due to systemic lupus.  Meantime, recommended hydrating her self well, using elastic support socks and increasing exercise activity.  She agreed with the plan.    Thank you for the opportunity to be involved in the care of Lorena Fritz. If you have any questions, please feel free to contact me.  I will see the patient again in 2 months and as needed    Much or all of the text in this note was generated through the use of Dragon Dictate voice-to-text software. Errors in spelling or words which seem out of context are unintentional.   Sound alike errors, in particular, may have escaped editing.       History of Present Illness:   It is my pleasure to see Lorena Fritz at the [facility] for evaluation of dizziness and nausea. Lorena Fritz is a 21 year old female with a medical history of systemic lupus erythematosus, chronic abdominal pain, anxiety, depression.    The patient is referred to cardiology clinic for evaluation  of dizziness, nausea.  She states that she developed dizziness and nausea over last 2 to 3 months.  She also complains of sometimes palpitations.  Her dizziness usually lasted for several hours.  She never had a syncopal episode.  She has been taking several medication for management of her systemic lupus.  She had no chest pain.  She had no orthopnea, PND or leg edema.  She has no fever chills or skin rash.  She complains some joint pain.  Her blood pressure and heart rate are controlled.    Her ECG in clinic today showed normal sinus rhythm, normal ECG.      Past Medical History:     Patient Active Problem List   Diagnosis     Systemic lupus erythematosus (H)     Chronic abdominal pain     Anxiety     Other systemic lupus erythematosus with other organ involvement (H)     Proteinuria     Depression, unspecified depression type       Past Surgical History:     Past Surgical History:   Procedure Laterality Date     ENDOSCOPY UPPER, COLONOSCOPY, COMBINED         Family History:     Family History   Problem Relation Age of Onset     Suicide Paternal Grandfather         Suicide in family members on both sides of family (great-uncle, mother's cousin, etc)     Osteoarthritis Paternal Grandmother      Ulcerative Colitis Other         Paternal great uncle and paternal great grandfather     Kidney Disease Other         Mom's side of the family. No one with kidney transplant, dialysis     Bone Spurs Maternal Grandmother      Macular Degeneration Maternal Grandfather      Other - See Comments Mother         PCOS, endometriosis     Arthritis Mother        Social History:    reports that she has been smoking. She has never used smokeless tobacco.    Review of Systems:   12 systems are reviewed negative except for in HPI.    Meds:     Current Outpatient Medications:      AMOXICILLIN PO, Take 1 tablet by mouth 1 tab daily for 10 days, Disp: , Rfl:      coenzyme Q-10 75 MG CAPS, Take 75 mg by mouth daily, Disp: , Rfl:       diphenhydrAMINE (BENADRYL) 25 MG capsule, Take by mouth as needed, Disp: , Rfl:      escitalopram (LEXAPRO) 10 MG tablet, Take 10 mg by mouth daily, Disp: , Rfl:      hydroxychloroquine (PLAQUENIL) 200 MG tablet, Take 1 tablet (200 mg) by mouth daily For systemic lupus erythematosus diaganosis, Disp: 90 tablet, Rfl: 1     hydrOXYzine (ATARAX) 25 MG tablet, Take by mouth as needed, Disp: , Rfl:      levonorgestrel (KYLEENA) 19.5 MG IUD, Kyleena 17.5 mcg/24 hrs (5yrs) 19.5mg intrauterine device  Take 1 device by intrauterine route., Disp: , Rfl:      mycophenolate (GENERIC EQUIVALENT) 500 MG tablet, Take 2 tablets(1000 mg)in the a.m. And 3 tablets (1500 mg) in the p.m., Disp: 450 tablet, Rfl: 0     nabumetone (RELAFEN) 500 MG tablet, Take 2 tablets (1,000 mg) by mouth daily, Disp: 180 tablet, Rfl: 2     ondansetron (ZOFRAN ODT) 4 MG ODT tab, Take by mouth as needed, Disp: , Rfl:      pantoprazole (PROTONIX) 40 MG EC tablet, Take 1 tablet (40 mg) by mouth daily, Disp: 90 tablet, Rfl: 1     prochlorperazine (COMPAZINE) 5 MG tablet, Take 10 mg by mouth as needed, Disp: , Rfl:      vitamin B-12 (CYANOCOBALAMIN) 1000 MCG tablet, Take by mouth once a week, Disp: , Rfl:      vitamin D3 (CHOLECALCIFEROL) 2000 units (50 mcg) tablet, Take 2,000 Units by mouth daily, Disp: , Rfl:      Cobalamine Combinations (B12 FOLATE PO), , Disp: , Rfl:      Ferrous Sulfate (SLOW FE PO), , Disp: , Rfl:      Allergies:   Cats    Objective:      Physical Exam  66.7 kg (147 lb) (77 %, Z= 0.74, Source: CDC (Girls, 2-20 Years))     Body mass index is 26.31 kg/m .  BP 96/58 (BP Location: Left arm, Patient Position: Sitting, Cuff Size: Adult Regular)   Pulse 96   Resp 16   Wt 66.7 kg (147 lb)   SpO2 97%   BMI 26.31 kg/m      General Appearance:   Awake, Alert, No acute distress.   HEENT:  Pupil equal, reactive to light. No scleral icterus; the mucous membranes were moist. No oral ulcers or thrush.    Neck: No cervical bruits. No JVD. No  thyromegaly. No lymph node enlargement or tenderness.   Chest: The spine was straight. The chest was symmetric.   Lungs:   Respirations unlabored. Lungs are clear to auscultation. No crackles. No wheezing.   Cardiovascular:   RRR, normal first and second heart sounds with no murmurs. No rubs or gallops.    Abdomen:  Soft. No tenderness. Non-distended. Bowels sounds are present   Extremities: Equal posterior tibial pulses. No leg edema.   Skin: No rashes or ulcers. Warm, Dry.   Musculoskeletal: No tenderness. No deformity.   Neurologic: Mood and affect are appropriate. No focal deficits.         EKG:  Personally reivewed  Normal sinus rhythm  Normal ECG  No previous ECG for comparison    Lab Review   Lab Results   Component Value Date     08/19/2017    CO2 22 08/19/2017    BUN 12 08/19/2017     Lab Results   Component Value Date    WBC 6.9 08/16/2022    WBC 5.6 05/06/2021    HGB 13.4 08/16/2022    HGB 13.5 05/06/2021    HCT 39.8 08/16/2022    HCT 41.2 05/06/2021    MCV 90 08/16/2022    MCV 90 05/06/2021     08/16/2022     05/06/2021     Lab Results   Component Value Date    TSH 0.55 03/03/2022    TSH 0.74 04/18/2019

## 2022-08-19 LAB
ATRIAL RATE - MUSE: 86 BPM
DIASTOLIC BLOOD PRESSURE - MUSE: NORMAL MMHG
INTERPRETATION ECG - MUSE: NORMAL
P AXIS - MUSE: 36 DEGREES
PR INTERVAL - MUSE: 120 MS
QRS DURATION - MUSE: 80 MS
QT - MUSE: 366 MS
QTC - MUSE: 437 MS
R AXIS - MUSE: 51 DEGREES
SYSTOLIC BLOOD PRESSURE - MUSE: NORMAL MMHG
T AXIS - MUSE: 25 DEGREES
VENTRICULAR RATE- MUSE: 86 BPM

## 2022-08-22 ENCOUNTER — HOSPITAL ENCOUNTER (OUTPATIENT)
Dept: CARDIOLOGY | Facility: CLINIC | Age: 22
Discharge: HOME | End: 2022-08-22
Attending: INTERNAL MEDICINE | Admitting: INTERNAL MEDICINE
Payer: COMMERCIAL

## 2022-08-22 DIAGNOSIS — M32.9 SYSTEMIC LUPUS ERYTHEMATOSUS, UNSPECIFIED SLE TYPE, UNSPECIFIED ORGAN INVOLVEMENT STATUS (H): ICD-10-CM

## 2022-08-22 LAB — LVEF ECHO: NORMAL

## 2022-08-22 PROCEDURE — 93306 TTE W/DOPPLER COMPLETE: CPT | Mod: 26 | Performed by: INTERNAL MEDICINE

## 2022-08-22 PROCEDURE — 93306 TTE W/DOPPLER COMPLETE: CPT

## 2022-08-22 RX ORDER — PANTOPRAZOLE SODIUM 40 MG/1
40 TABLET, DELAYED RELEASE ORAL DAILY
Qty: 90 TABLET | Refills: 2 | Status: SHIPPED | OUTPATIENT
Start: 2022-08-22 | End: 2024-08-15

## 2022-08-23 ENCOUNTER — LAB REQUISITION (OUTPATIENT)
Dept: LAB | Facility: CLINIC | Age: 22
End: 2022-08-23

## 2022-08-23 DIAGNOSIS — Z11.3 ENCOUNTER FOR SCREENING FOR INFECTIONS WITH A PREDOMINANTLY SEXUAL MODE OF TRANSMISSION: ICD-10-CM

## 2022-08-23 PROCEDURE — 87491 CHLMYD TRACH DNA AMP PROBE: CPT | Performed by: ADVANCED PRACTICE MIDWIFE

## 2022-08-23 PROCEDURE — 87591 N.GONORRHOEAE DNA AMP PROB: CPT | Performed by: ADVANCED PRACTICE MIDWIFE

## 2022-08-24 LAB
C TRACH DNA SPEC QL PROBE+SIG AMP: NEGATIVE
N GONORRHOEA DNA SPEC QL NAA+PROBE: NEGATIVE

## 2022-09-11 ENCOUNTER — HEALTH MAINTENANCE LETTER (OUTPATIENT)
Age: 22
End: 2022-09-11

## 2022-10-07 ENCOUNTER — TELEPHONE (OUTPATIENT)
Dept: RHEUMATOLOGY | Facility: CLINIC | Age: 22
End: 2022-10-07

## 2022-10-07 NOTE — TELEPHONE ENCOUNTER
I spoke to Lorena about the option of starting Paxlovid for treatment of COVID. She has had symptoms off and on for 2 weeks, but worsened yesterday with the development of fever, so the timeline of symptoms is not really clear. She's unsure if this all started 2 weeks ago of if she had two infections. We discussed that even if symptoms started 2 days ago, there are no clear guidelines whether she should take it. We discussed that it should be given within 5 days of symptom onset for patients who are at high risk for severe illness. We discussed that some would say that anyone with lupus on mycophenolate would be high risk. However, in our practice we have not seen patients with well-controlled lupus on mycophenolate have severe illness. She does not have other risk factors that I would be worried about including treatment with rituximab, high dose steroids, cyclophosphamide, and her lupus is under good control and has been for a long time. She does not have other risk factors more commonly seen in adults such as obesity, heart disease, chronic lung disease. We discussed that one down side of Paxlovid is that some patients have prolonged or rebound symptoms. However, generally it is a safe treatment and if she'd like to get it I can prescribe it. Therefore, we ultimately decided not to give the treatment. However, I recommended that she return to an urgent care or ER if she's have worsening symptoms such as worsening fever, trouble breathing, etc.     She asked if she should take the antibiotics given for bronchitis. She explained that these were prescribed prior to the COVID test coming back positive. We discussed that the bronchitis is likely due to COVID, but since she did report 2 weeks of symptoms prior to a fever it is possible she developed a secondary infection and I would defer to following the instructions of the provider who saw her in urgent care.     I recommended she call our on-call provider with any  concerns over the weekend.     Petra Will MD  Pediatric Rheumatology  Pager [phone]

## 2022-10-07 NOTE — TELEPHONE ENCOUNTER
"I returned Lorena's call. She is COVID positive which resulted today. She has fever 100.2, aches, cough, runny nose and congestion. Her chest also \"hurts\". She was seen in urgent care today and was also prescribed antibiotics for presumed bronchitis. She is wondering anything specifically to be done. I let her know that it is OK to take her medications as prescribed, but in the event that she has worsening symptoms, she should be seen urgently due to her immunocompromised status. I will let  know this information and call her back if there is any other recommendations.   "

## 2022-11-08 NOTE — TELEPHONE ENCOUNTER
"Lorena called. She said that last night, she woke up with a fever of 101 degrees F. She wanted to let us know this since she started her meloxicam and read that she should tell us if she has a fever. She said she also has a bad headache today and is congested. No other symptoms, she is \"doing fine\". She is currently looking into COVID testing and plans to have this done. No fever today.     I recommended that she be tested for COVID-19 and update us with the results. She should be seen by a PCP if she continues to have fevers or her other symptoms worsen or persist. I asked that she continue to keep us updated on results/symptoms. She agreed. I will call back with any additional recommendations or if Dr. Will thinks she needs to be seen now for evaluation.   "
Agree, thank you.   
no fever and no chills.

## 2023-01-03 DIAGNOSIS — M32.19 OTHER SYSTEMIC LUPUS ERYTHEMATOSUS WITH OTHER ORGAN INVOLVEMENT (H): Primary | Chronic | ICD-10-CM

## 2023-01-05 ENCOUNTER — LAB (OUTPATIENT)
Dept: LAB | Facility: CLINIC | Age: 23
End: 2023-01-05
Attending: INTERNAL MEDICINE
Payer: COMMERCIAL

## 2023-01-05 DIAGNOSIS — M32.19 OTHER SYSTEMIC LUPUS ERYTHEMATOSUS WITH OTHER ORGAN INVOLVEMENT (H): ICD-10-CM

## 2023-01-05 LAB
ALBUMIN SERPL-MCNC: 3.6 G/DL (ref 3.4–5)
ALBUMIN UR-MCNC: 20 MG/DL
ALP SERPL-CCNC: 61 U/L (ref 40–150)
ALT SERPL W P-5'-P-CCNC: 26 U/L (ref 0–50)
APPEARANCE UR: CLEAR
AST SERPL W P-5'-P-CCNC: 15 U/L (ref 0–45)
BASOPHILS # BLD AUTO: 0 10E3/UL (ref 0–0.2)
BASOPHILS NFR BLD AUTO: 0 %
BILIRUB DIRECT SERPL-MCNC: <0.1 MG/DL (ref 0–0.2)
BILIRUB SERPL-MCNC: 0.4 MG/DL (ref 0.2–1.3)
BILIRUB UR QL STRIP: NEGATIVE
COLOR UR AUTO: YELLOW
CREAT SERPL-MCNC: 0.59 MG/DL (ref 0.52–1.04)
CREAT UR-MCNC: 234 MG/DL
CRP SERPL-MCNC: <2.9 MG/L (ref 0–8)
EOSINOPHIL # BLD AUTO: 0.1 10E3/UL (ref 0–0.7)
EOSINOPHIL NFR BLD AUTO: 1 %
ERYTHROCYTE [DISTWIDTH] IN BLOOD BY AUTOMATED COUNT: 12 % (ref 10–15)
GFR SERPL CREATININE-BSD FRML MDRD: >90 ML/MIN/1.73M2
GLUCOSE UR STRIP-MCNC: NEGATIVE MG/DL
HCT VFR BLD AUTO: 41 % (ref 35–47)
HGB BLD-MCNC: 13.8 G/DL (ref 11.7–15.7)
HGB UR QL STRIP: NEGATIVE
IMM GRANULOCYTES # BLD: 0.1 10E3/UL
IMM GRANULOCYTES NFR BLD: 1 %
KETONES UR STRIP-MCNC: NEGATIVE MG/DL
LEUKOCYTE ESTERASE UR QL STRIP: NEGATIVE
LYMPHOCYTES # BLD AUTO: 2 10E3/UL (ref 0.8–5.3)
LYMPHOCYTES NFR BLD AUTO: 31 %
MCH RBC QN AUTO: 30.3 PG (ref 26.5–33)
MCHC RBC AUTO-ENTMCNC: 33.7 G/DL (ref 31.5–36.5)
MCV RBC AUTO: 90 FL (ref 78–100)
MONOCYTES # BLD AUTO: 0.5 10E3/UL (ref 0–1.3)
MONOCYTES NFR BLD AUTO: 8 %
MUCOUS THREADS #/AREA URNS LPF: PRESENT /LPF
NEUTROPHILS # BLD AUTO: 3.9 10E3/UL (ref 1.6–8.3)
NEUTROPHILS NFR BLD AUTO: 59 %
NITRATE UR QL: NEGATIVE
NRBC # BLD AUTO: 0 10E3/UL
NRBC BLD AUTO-RTO: 0 /100
PH UR STRIP: 7 [PH] (ref 5–7)
PLATELET # BLD AUTO: 269 10E3/UL (ref 150–450)
PROT SERPL-MCNC: 7.5 G/DL (ref 6.8–8.8)
PROT UR-MCNC: 0.12 G/L
PROT/CREAT 24H UR: 0.05 G/G CR (ref 0–0.2)
RBC # BLD AUTO: 4.56 10E6/UL (ref 3.8–5.2)
RBC URINE: <1 /HPF
SP GR UR STRIP: 1.02 (ref 1–1.03)
SQUAMOUS EPITHELIAL: 3 /HPF
UROBILINOGEN UR STRIP-MCNC: NORMAL MG/DL
WBC # BLD AUTO: 6.5 10E3/UL (ref 4–11)
WBC URINE: 1 /HPF

## 2023-01-05 PROCEDURE — 36415 COLL VENOUS BLD VENIPUNCTURE: CPT

## 2023-01-05 PROCEDURE — 86160 COMPLEMENT ANTIGEN: CPT

## 2023-01-05 PROCEDURE — 82565 ASSAY OF CREATININE: CPT

## 2023-01-05 PROCEDURE — 85004 AUTOMATED DIFF WBC COUNT: CPT

## 2023-01-05 PROCEDURE — 86225 DNA ANTIBODY NATIVE: CPT

## 2023-01-05 PROCEDURE — 86140 C-REACTIVE PROTEIN: CPT

## 2023-01-05 PROCEDURE — 81001 URINALYSIS AUTO W/SCOPE: CPT

## 2023-01-05 PROCEDURE — 80076 HEPATIC FUNCTION PANEL: CPT

## 2023-01-05 PROCEDURE — 84156 ASSAY OF PROTEIN URINE: CPT

## 2023-01-06 LAB
C3 SERPL-MCNC: 108 MG/DL (ref 81–157)
C4 SERPL-MCNC: 19 MG/DL (ref 13–39)

## 2023-01-11 LAB — DSDNA AB SER-ACNC: 17 IU/ML

## 2023-02-02 ENCOUNTER — DOCUMENTATION ONLY (OUTPATIENT)
Dept: RHEUMATOLOGY | Facility: CLINIC | Age: 23
End: 2023-02-02
Payer: COMMERCIAL

## 2023-02-02 ENCOUNTER — OFFICE VISIT (OUTPATIENT)
Dept: RHEUMATOLOGY | Facility: CLINIC | Age: 23
End: 2023-02-02
Attending: INTERNAL MEDICINE
Payer: COMMERCIAL

## 2023-02-02 VITALS
RESPIRATION RATE: 20 BRPM | WEIGHT: 149.47 LBS | DIASTOLIC BLOOD PRESSURE: 72 MMHG | TEMPERATURE: 98.9 F | OXYGEN SATURATION: 97 % | BODY MASS INDEX: 26.48 KG/M2 | SYSTOLIC BLOOD PRESSURE: 122 MMHG | HEIGHT: 63 IN | HEART RATE: 92 BPM

## 2023-02-02 DIAGNOSIS — M32.9 SYSTEMIC LUPUS ERYTHEMATOSUS, UNSPECIFIED SLE TYPE, UNSPECIFIED ORGAN INVOLVEMENT STATUS (H): Primary | ICD-10-CM

## 2023-02-02 PROCEDURE — G0463 HOSPITAL OUTPT CLINIC VISIT: HCPCS

## 2023-02-02 PROCEDURE — 99213 OFFICE O/P EST LOW 20 MIN: CPT | Performed by: INTERNAL MEDICINE

## 2023-02-02 PROCEDURE — 99212 OFFICE O/P EST SF 10 MIN: CPT | Performed by: INTERNAL MEDICINE

## 2023-02-02 PROCEDURE — 36415 COLL VENOUS BLD VENIPUNCTURE: CPT | Performed by: INTERNAL MEDICINE

## 2023-02-02 PROCEDURE — 86225 DNA ANTIBODY NATIVE: CPT | Performed by: INTERNAL MEDICINE

## 2023-02-02 ASSESSMENT — PAIN SCALES - GENERAL: PAINLEVEL: SEVERE PAIN (7)

## 2023-02-02 ASSESSMENT — PATIENT HEALTH QUESTIONNAIRE - PHQ9: SUM OF ALL RESPONSES TO PHQ QUESTIONS 1-9: 7

## 2023-02-02 NOTE — PROGRESS NOTES
Medications:   As of completion of this visit:  Current Outpatient Medications   Medication Sig Dispense Refill     AMOXICILLIN PO Take 1 tablet by mouth 1 tab daily for 10 days       Cobalamine Combinations (B12 FOLATE PO)  (Patient not taking: Reported on 8/18/2022)       coenzyme Q-10 75 MG CAPS Take 75 mg by mouth daily       diphenhydrAMINE (BENADRYL) 25 MG capsule Take by mouth as needed       escitalopram (LEXAPRO) 10 MG tablet Take 10 mg by mouth daily       Ferrous Sulfate (SLOW FE PO)  (Patient not taking: No sig reported)       hydroxychloroquine (PLAQUENIL) 200 MG tablet Take 1 tablet (200 mg) by mouth daily For systemic lupus erythematosus diaganosis 90 tablet 1     hydrOXYzine (ATARAX) 25 MG tablet Take by mouth as needed       levonorgestrel (KYLEENA) 19.5 MG IUD Kyleena 17.5 mcg/24 hrs (5yrs) 19.5mg intrauterine device   Take 1 device by intrauterine route.       nabumetone (RELAFEN) 500 MG tablet Take 2 tablets (1,000 mg) by mouth daily 180 tablet 2     ondansetron (ZOFRAN ODT) 4 MG ODT tab Take by mouth as needed       pantoprazole (PROTONIX) 40 MG EC tablet Take 1 tablet (40 mg) by mouth daily 90 tablet 2     prochlorperazine (COMPAZINE) 5 MG tablet Take 10 mg by mouth as needed       vitamin B-12 (CYANOCOBALAMIN) 1000 MCG tablet Take by mouth once a week       vitamin D3 (CHOLECALCIFEROL) 2000 units (50 mcg) tablet Take 2,000 Units by mouth daily       Prescribed medications have been administered regularly, without missed doses, and the medications have been tolerated well, without side effects.         Allergies:     Allergies   Allergen Reactions     Cats Itching           Problem list:     Patient Active Problem List    Diagnosis Date Noted     Proteinuria 01/10/2018     Priority: High     Depression, unspecified depression type 03/04/2022     Priority: Medium     Other systemic lupus erythematosus with other organ involvement (H) 07/31/2017     Priority: Medium     Anxiety 09/26/2016  "    Priority: Medium     Systemic lupus erythematosus (H) 09/10/2016     Priority: Medium     Arthritis (now improved), positive dsDNA, hypocomplementemia, Leydi positive (now negative).     No nephritis, ECHO and PFTs within normal limits    [facility])    Brain MRI essentially normal but suboptimal evaluation due to braces. Anti-neuronal antibody and ribosomal P antibodies negative.        Chronic abdominal pain 09/10/2016     Priority: Medium            Subjective:     Lorena is a 22 year old female who was seen in Pediatric Rheumatology clinic today for follow up of systemic lupus erythematosus (SLE). Lorena isun accompanied today.. At the last visit 9 months ago, she was doing well thus we made no changes to therapies. Since that time she has been doing well. She is loving [employer]. She is an English major.     She admitted today that she stopped taking all her lupus medications one year ago. She said they made her feel bad so she stopped them. She recently had labs that were normal. No other significant symptoms. She feels great. She has had no significant lupus symptoms.     She has had ongoing joint pain that she attributes to cold weather.     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     A 14-point review of systems was negative except as follows: fatigue, night sweats, change in sleep, lightheadedness with standing, nausea, constipation, rash, headache, anxiety, depression           Examination:   Blood pressure 122/72, pulse 92, temperature 98.9  F (37.2  C), temperature source Tympanic, resp. rate 20, height 1.588 m (5' 2.52\"), weight 67.8 kg (149 lb 7.6 oz), SpO2 97 %.  Facility age limit for growth percentiles is 20 years.  Growth percentile SmartLinks can only be used for patients less than 20 years old.  Gen: Pleasant, well-appearing, NAD  HEENT/Neck: TM's clear bilaterally, oropharynx is clear without lesions, neck is supple with no lymphadenopathy        "           CV: Regular rate and rhythm, normal S1, S2, no murmurs  Resp: Clear to ascultation bilaterally  Abd: Soft, non-tender, non-distended, no hepatosplenomegaly  Skin: Clear, there is no rash  MSK: All joints were examined including TMJ, sternoclavicular, acromioclavicular, neck, shoulder, elbow, wrist, hips, knees, ankles, fingers, and toes, and all were normal. She has generalized joint hypermobility, stable to previous exams.         Last Lab Results:     Lab on 01/05/2023   Component Date Value     C3 Complement 01/05/2023 108      C4 Complement 01/05/2023 19      Creatinine 01/05/2023 0.59      GFR Estimate 01/05/2023 >90      CRP Inflammation 01/05/2023 <2.9      DNA (ds) Antibody 01/05/2023 17.0 (H)      Bilirubin Total 01/05/2023 0.4      Bilirubin Direct 01/05/2023 <0.1      Protein Total 01/05/2023 7.5      Albumin 01/05/2023 3.6      Alkaline Phosphatase 01/05/2023 61      AST 01/05/2023 15      ALT 01/05/2023 26      Color Urine 01/05/2023 Yellow      Appearance Urine 01/05/2023 Clear      Glucose Urine 01/05/2023 Negative      Bilirubin Urine 01/05/2023 Negative      Ketones Urine 01/05/2023 Negative      Specific Gravity Urine 01/05/2023 1.022      Blood Urine 01/05/2023 Negative      pH Urine 01/05/2023 7.0      Protein Albumin Urine 01/05/2023 20 (A)      Urobilinogen Urine 01/05/2023 Normal      Nitrite Urine 01/05/2023 Negative      Leukocyte Esterase Urine 01/05/2023 Negative      Mucus Urine 01/05/2023 Present (A)      RBC Urine 01/05/2023 <1      WBC Urine 01/05/2023 1      Squamous Epithelials Uri* 01/05/2023 3 (H)      Total Protein Random Uri* 01/05/2023 0.12      Total Protein Urine g/gr* 01/05/2023 0.05      Creatinine Urine mg/dL 01/05/2023 234      WBC Count 01/05/2023 6.5      RBC Count 01/05/2023 4.56      Hemoglobin 01/05/2023 13.8      Hematocrit 01/05/2023 41.0      MCV 01/05/2023 90      MCH 01/05/2023 30.3      MCHC 01/05/2023 33.7      RDW 01/05/2023 12.0      Platelet Count  "01/05/2023 269      % Neutrophils 01/05/2023 59      % Lymphocytes 01/05/2023 31      % Monocytes 01/05/2023 8      % Eosinophils 01/05/2023 1      % Basophils 01/05/2023 0      % Immature Granulocytes 01/05/2023 1      NRBCs per 100 WBC 01/05/2023 0      Absolute Neutrophils 01/05/2023 3.9      Absolute Lymphocytes 01/05/2023 2.0      Absolute Monocytes 01/05/2023 0.5      Absolute Eosinophils 01/05/2023 0.1      Absolute Basophils 01/05/2023 0.0      Absolute Immature Granul* 01/05/2023 0.1      Absolute NRBCs 01/05/2023 0.0                 Assessment:     Lorena is a 22 year old female with systemic lupus erythematosus. She had been treated with mycophenolate and hydroxychloroquine, however, today she reported to me that she stopped them one year ago. She says she actually feels better since stopping them since they made her \"feel bad\". Reassuringly, 3 weeks ago her labs were essentially normal including a very low (nearly normal) ds-DNA, normal C3 and C4 and normal CBC. She has not had lupus nephritis. She does have joint pain that she attributes to arthritis related to lupus, but it is unclear to me if this is at least partially due to her joint hypermobility.     We discussed that the concern with stopping medications when someone has lupus is that they can flare. However, she's been off all therapies for a year and has not flared and feels well. She may be a fortunately individual who has gone in remission and who may stay in remission for an extended period of time. We discussed the option of restarting the hydroxychloroquine since my suspicion is that the medication that made her feel bad was mycophenolate. Also, hydroxychloroquine is only dosed once daily and is very safe. If her joint pain is from arthritis, it also helps with this. She was interested in restarting it. No need to restart mycophenolate at this time, but if she starts to show signs of flare we will restart it. She understands the importance " of routine lab work even if she's feeling fine.          Plan:     1. Monitoring labs were obtained today.   2. Continue current therapies. Restart hydroxychloroquine.   3. I recommend annual eye exams while on hydroxychlorquine (Plaquenil).   4. Return in about 3 months (around 5/2/2023). Call sooner with any concerns.     If there are any new questions or concerns, I would be glad to help and can be reached through our main office at [phone] or our paging  at [phone].    21 minutes spent on the date of the encounter doing chart review, history and exam, documentation and further activities as noted above.     Petra Will MD  Pediatric Rheumatology  [facility]

## 2023-02-02 NOTE — PATIENT INSTRUCTIONS
For Patient Education Materials:  [email or web address]       [facility] Pediatric Rheumatology    For Help:  The Pediatric Call Center at [phone] can help with scheduling of routine follow up visits.  Yana Huerta and Alcira Roe are the Nurse Coordinators for the Division of Pediatric Rheumatology and can be reached by phone at [phone] or through [employer] [email or web address]). They can help with questions about your child s rheumatic condition, medications, and test results.  For emergencies after hours or on the weekends, please call the page  at [phone] and ask to speak to the physician on-call for Pediatric Rheumatology. Please do not use [employer] for urgent requests.  Main  Services:  [phone]  Hmong/Sudanese/Nicaraguan: [phone]  Hong Konger: [phone]  French: [phone]    Internal Referrals: If we refer your child to another physician/team within [facility], you should receive a call to set this up. If you do not hear anything within a week, please call the Call Center at [phone].    External Referrals: If we refer your child to a physician/team outside of [facility], our team will send the referral order and relevant records to them. We ask that you call the place where your child is being referred to ensure they received the needed information and notify our team coordinators if not.    Imaging: If your child needs an imaging study that is not being performed the day of your clinic appointment, please call to set this up. For xrays, ultrasounds, and echocardiogram call [phone]. For CT or MRI call [phone].     MyChart: We encourage you to sign up for [employer] at [email or web address]. For assistance or questions, call [phone]. If your child is 12 years or older, a consent for proxy/parent access needs to be signed so please discuss this with your physician at the next visit.

## 2023-02-03 NOTE — PROGRESS NOTES
"Data/Assessment:  Popeye is a 22 y.o. female, in clinic alone today for routine SLE follow-up visit and lab work.  Her initial diagnosis was in 2016.  Pt currently lives at college in [place].  At other times, she lives at home with her mother, father, and 19 y.o. brother.  Pt is her own medical decision maker.      Lorena resumed studies this coming Fall at [employer] for English and [employer].  She set up a disabilities accomodations there to assist with her Lupus symptoms, and reports mixed response to getting assistance from professors.  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  Pt reports that she stays busy, as that helps distract from her anxiety.       Pt reports feeling safe in all relationships, as well as at home and at school.       Pt identifies her family and 2 close friends as her current support system. She reports that she is coping with her Lupus, but has a \"hard time\" with her symptoms (joint pain, fatigue, memory loss, etc.).  Pt reports that Lupus and her treatments \"sometimes\" keep her from doing what she wants to do in life.      Lorena reports that she is confident in her ability to get her medications, but self-rates as 3:10 in taking the medications.  She reports not liking the side effects, so she \"only wants to take certain ones\".  Pt states that she takes her anti-depressants 100% of the time as well as one of her arthritis meds that she finds helpful.  Pt eventually admitted that she has stopped all of her Lupus meds for at least 1 year, as she doesn't like the taste and side effects.  Lorena initially doesn't want SW to share this information with Dr Will, for an unknown/unclear reason.  SW told Lorena that in working on a team Dr Will would find this out, and it's important information to share.  SW engaged a nonjudgmental approach, wanting to encourage full disclosure.  Pt then was agreeable to SW sharing with Dr Will.  Pt self-reports at a 10:10 for setting up and attending " "her Rheumatology appts.  In the past, pt found Lupus camps helpful, as she appreciated the opportunity to meet others with SLE.  Pt is able to advocate for self as needed.      Pt reports having difficulty falling asleep, but is then able to sleep the rest of the night.  She says that she \"doesn't have the same hunger cues\" since being dx with SLE, and has to remind self to eat.  Lorena is active/exercises, but reports that the amount depends on her pain level, but when well, she enjoys gymnastics and indoor rock climbing. Pt believes that her schedule is better currently, and this has a positive impact on coping with her disease.  Pt is \"hopeful about life, because there are fun things on the horizon\".  Pt denies SIB, hallucinations, delusions, and current suicidality.       Pt endorses \"often\" experiencing increased anxiety and depression due to Lupus and caring for her health.  Pt believes that her anxiety was \"Lupus-induced\", and self-rates anxiety as 5:10 with a baseline of 4:10.  MH Screening was completed today, and pt rated as a \"7\" on the DHARA-7.   Affectively, pt appears to be doing well today.  Pt's anxiety symptoms may include: ruminating, overthinking, past social anxiety (better now), chest tightening, fidgeting, occasional panic attacks, hyperventilating, and crying.  Lorena has a specific aversion to IVs and shots, and states she she is \"triggered\" by medical shows and crowds.        Pt endorses depression since the pandemic started, and self-rates as 2:10 today, with a baseline of 5:10.  Pt endorses hopeless thoughts, feeling sad, crying, and lacking motivation to get out of bed at times as her constellation of depressive symptoms.  Lorena says this has improved recently.  MH screening was completed today, and the PHQ-9 scored as a \"7\" today.       The positive news is that pt has been receiving services for her mental health for a few years, including seeing a therapist in the past and receiving " medication therapy (Lexapro) through her PCP.  Pt was seeing Rad Denny at [facility], and stopped upon moving to [place] for college.  Pt has no hx of MH hospitalizations.  Pt is comfortable reaching out to her PCP, therapist, or family if she ever has suicidal thoughts or notices that her MH is deteriorating for any reason. Lorena is interested in restarting therapy.  SW offered to send options via [employer], and pt was grateful for that.        Plan/Recommendations:  -Continue to follow-up with PCP for medication     -Pt expressed interest in joining a Young Adult Support Group for SLE, so SW provided this information for her.       -Pt would like to start therapy in [place] with a new therapist for continued maintenance of mental health.  A number of options were provided via [employer].      -Amenities: provided 2 food vouchers today.             PINEDA Gray/PATRICIA  Clinical /Lupus Mental Health Navigator   Pediatric Rheumatology     *no letter*

## 2023-02-08 LAB — DSDNA AB SER-ACNC: 44 IU/ML

## 2023-05-06 ENCOUNTER — HEALTH MAINTENANCE LETTER (OUTPATIENT)
Age: 23
End: 2023-05-06

## 2023-05-11 ENCOUNTER — OFFICE VISIT (OUTPATIENT)
Dept: RHEUMATOLOGY | Facility: CLINIC | Age: 23
End: 2023-05-11
Attending: INTERNAL MEDICINE
Payer: COMMERCIAL

## 2023-05-11 VITALS
TEMPERATURE: 99.1 F | RESPIRATION RATE: 20 BRPM | WEIGHT: 155.2 LBS | HEIGHT: 63 IN | DIASTOLIC BLOOD PRESSURE: 80 MMHG | OXYGEN SATURATION: 97 % | BODY MASS INDEX: 27.5 KG/M2 | SYSTOLIC BLOOD PRESSURE: 122 MMHG | HEART RATE: 104 BPM

## 2023-05-11 DIAGNOSIS — M32.9 SYSTEMIC LUPUS ERYTHEMATOSUS, UNSPECIFIED SLE TYPE, UNSPECIFIED ORGAN INVOLVEMENT STATUS (H): Primary | ICD-10-CM

## 2023-05-11 LAB
ALBUMIN MFR UR ELPH: 8.1 MG/DL (ref 1–14)
ALBUMIN SERPL BCG-MCNC: 4.1 G/DL (ref 3.5–5.2)
ALBUMIN UR-MCNC: NEGATIVE MG/DL
ALP SERPL-CCNC: 68 U/L (ref 35–104)
ALT SERPL W P-5'-P-CCNC: 42 U/L (ref 10–35)
APPEARANCE UR: CLEAR
AST SERPL W P-5'-P-CCNC: 31 U/L (ref 10–35)
BACTERIA #/AREA URNS HPF: ABNORMAL /HPF
BASOPHILS # BLD AUTO: 0 10E3/UL (ref 0–0.2)
BASOPHILS NFR BLD AUTO: 0 %
BILIRUB DIRECT SERPL-MCNC: <0.2 MG/DL (ref 0–0.3)
BILIRUB SERPL-MCNC: 0.4 MG/DL
BILIRUB UR QL STRIP: NEGATIVE
COLOR UR AUTO: ABNORMAL
CREAT SERPL-MCNC: 0.75 MG/DL (ref 0.51–0.95)
CREAT UR-MCNC: 121.8 MG/DL
CRP SERPL-MCNC: <3 MG/L
EOSINOPHIL # BLD AUTO: 0.1 10E3/UL (ref 0–0.7)
EOSINOPHIL NFR BLD AUTO: 1 %
ERYTHROCYTE [DISTWIDTH] IN BLOOD BY AUTOMATED COUNT: 11.9 % (ref 10–15)
GFR SERPL CREATININE-BSD FRML MDRD: >90 ML/MIN/1.73M2
GLUCOSE UR STRIP-MCNC: NEGATIVE MG/DL
HCT VFR BLD AUTO: 41.5 % (ref 35–47)
HGB BLD-MCNC: 13.9 G/DL (ref 11.7–15.7)
HGB UR QL STRIP: NEGATIVE
IMM GRANULOCYTES # BLD: 0 10E3/UL
IMM GRANULOCYTES NFR BLD: 1 %
KETONES UR STRIP-MCNC: NEGATIVE MG/DL
LEUKOCYTE ESTERASE UR QL STRIP: NEGATIVE
LYMPHOCYTES # BLD AUTO: 1.9 10E3/UL (ref 0.8–5.3)
LYMPHOCYTES NFR BLD AUTO: 31 %
MCH RBC QN AUTO: 30.8 PG (ref 26.5–33)
MCHC RBC AUTO-ENTMCNC: 33.5 G/DL (ref 31.5–36.5)
MCV RBC AUTO: 92 FL (ref 78–100)
MONOCYTES # BLD AUTO: 0.5 10E3/UL (ref 0–1.3)
MONOCYTES NFR BLD AUTO: 9 %
MUCOUS THREADS #/AREA URNS LPF: PRESENT /LPF
NEUTROPHILS # BLD AUTO: 3.5 10E3/UL (ref 1.6–8.3)
NEUTROPHILS NFR BLD AUTO: 58 %
NITRATE UR QL: NEGATIVE
NRBC # BLD AUTO: 0 10E3/UL
NRBC BLD AUTO-RTO: 0 /100
PH UR STRIP: 6.5 [PH] (ref 5–7)
PLATELET # BLD AUTO: 247 10E3/UL (ref 150–450)
PROT SERPL-MCNC: 7.4 G/DL (ref 6.4–8.3)
PROT/CREAT 24H UR: 0.07 MG/MG CR (ref 0–0.2)
RBC # BLD AUTO: 4.52 10E6/UL (ref 3.8–5.2)
RBC URINE: 1 /HPF
SP GR UR STRIP: 1.02 (ref 1–1.03)
SQUAMOUS EPITHELIAL: 2 /HPF
TSH SERPL DL<=0.005 MIU/L-ACNC: 1.01 UIU/ML (ref 0.3–4.2)
UROBILINOGEN UR STRIP-MCNC: NORMAL MG/DL
WBC # BLD AUTO: 6 10E3/UL (ref 4–11)
WBC URINE: <1 /HPF

## 2023-05-11 PROCEDURE — 84443 ASSAY THYROID STIM HORMONE: CPT | Performed by: INTERNAL MEDICINE

## 2023-05-11 PROCEDURE — 84156 ASSAY OF PROTEIN URINE: CPT | Performed by: INTERNAL MEDICINE

## 2023-05-11 PROCEDURE — 99215 OFFICE O/P EST HI 40 MIN: CPT | Performed by: INTERNAL MEDICINE

## 2023-05-11 PROCEDURE — 99213 OFFICE O/P EST LOW 20 MIN: CPT | Performed by: INTERNAL MEDICINE

## 2023-05-11 PROCEDURE — 80076 HEPATIC FUNCTION PANEL: CPT | Performed by: INTERNAL MEDICINE

## 2023-05-11 PROCEDURE — 82565 ASSAY OF CREATININE: CPT | Performed by: INTERNAL MEDICINE

## 2023-05-11 PROCEDURE — 86140 C-REACTIVE PROTEIN: CPT | Performed by: INTERNAL MEDICINE

## 2023-05-11 PROCEDURE — 86160 COMPLEMENT ANTIGEN: CPT | Performed by: INTERNAL MEDICINE

## 2023-05-11 PROCEDURE — 81001 URINALYSIS AUTO W/SCOPE: CPT | Performed by: INTERNAL MEDICINE

## 2023-05-11 PROCEDURE — 86225 DNA ANTIBODY NATIVE: CPT | Performed by: INTERNAL MEDICINE

## 2023-05-11 PROCEDURE — 85025 COMPLETE CBC W/AUTO DIFF WBC: CPT | Performed by: INTERNAL MEDICINE

## 2023-05-11 PROCEDURE — 36415 COLL VENOUS BLD VENIPUNCTURE: CPT | Performed by: INTERNAL MEDICINE

## 2023-05-11 RX ORDER — FOLIC ACID 1 MG/1
1 TABLET ORAL DAILY
Qty: 90 TABLET | Refills: 3 | Status: SHIPPED | OUTPATIENT
Start: 2023-05-11 | End: 2023-12-13

## 2023-05-11 RX ORDER — METHOTREXATE 25 MG/ML
15 INJECTION, SOLUTION INTRA-ARTERIAL; INTRAMUSCULAR; INTRAVENOUS WEEKLY
Qty: 4 ML | Refills: 3 | Status: SHIPPED | OUTPATIENT
Start: 2023-05-11 | End: 2023-11-15

## 2023-05-11 ASSESSMENT — PAIN SCALES - GENERAL: PAINLEVEL: SEVERE PAIN (7)

## 2023-05-11 NOTE — NURSING NOTE
Peds Outpatient BP  1) Rested for 5 minutes, BP taken on bare arm, patient sitting (or supine for infants) w/ legs uncrossed?   Yes  2) Right arm used?  Right arm   Yes  3) Arm circumference of largest part of upper arm (in cm): 30  4) BP cuff sized used: Adult (25-32cm)   If used different size cuff then what was recommended why? N/A  5) First BP reading:machine   BP Readings from Last 1 Encounters:   05/11/23 122/80      Is reading >90%?No   (90% for <1 years is 90/50)  (90% for >18 years is 140/90)  *If a machine BP is at or above 90% take manual BP  6) Manual BP reading: N/A  7) Other comments: None    Zoie Angel CMA.

## 2023-05-11 NOTE — NURSING NOTE
"Chief Complaint   Patient presents with     Arthritis     Lupus.     Vitals:    05/11/23 1011   BP: 122/80   BP Location: Right arm   Patient Position: Chair   Pulse: 104   Resp: 20   Temp: 99.1  F (37.3  C)   TempSrc: Tympanic   SpO2: 97%   Weight: 155 lb 3.3 oz (70.4 kg)   Height: 5' 2.6\" (159 cm)           Zoie Angel M.A.    May 11, 2023  "

## 2023-05-11 NOTE — PROGRESS NOTES
Medications:   As of completion of this visit:  Current Outpatient Medications   Medication Sig Dispense Refill     folic acid (FOLVITE) 1 MG tablet Take 1 tablet (1 mg) by mouth daily 90 tablet 3     methotrexate 50 MG/2ML injection Inject 0.6 mLs (15 mg) Subcutaneous once a week 4 mL 3     AMOXICILLIN PO Take 1 tablet by mouth 1 tab daily for 10 days       Cobalamine Combinations (B12 FOLATE PO)  (Patient not taking: Reported on 8/18/2022)       coenzyme Q-10 75 MG CAPS Take 75 mg by mouth daily       diphenhydrAMINE (BENADRYL) 25 MG capsule Take by mouth as needed       escitalopram (LEXAPRO) 10 MG tablet Take 10 mg by mouth daily       Ferrous Sulfate (SLOW FE PO)  (Patient not taking: No sig reported)       hydroxychloroquine (PLAQUENIL) 200 MG tablet Take 1 tablet (200 mg) by mouth daily For systemic lupus erythematosus diaganosis 90 tablet 1     hydrOXYzine (ATARAX) 25 MG tablet Take by mouth as needed       levonorgestrel (KYLEENA) 19.5 MG IUD Kyleena 17.5 mcg/24 hrs (5yrs) 19.5mg intrauterine device   Take 1 device by intrauterine route.       nabumetone (RELAFEN) 500 MG tablet Take 2 tablets (1,000 mg) by mouth daily 180 tablet 2     ondansetron (ZOFRAN ODT) 4 MG ODT tab Take by mouth as needed       pantoprazole (PROTONIX) 40 MG EC tablet Take 1 tablet (40 mg) by mouth daily 90 tablet 2     prochlorperazine (COMPAZINE) 5 MG tablet Take 10 mg by mouth as needed       vitamin B-12 (CYANOCOBALAMIN) 1000 MCG tablet Take by mouth once a week       vitamin D3 (CHOLECALCIFEROL) 2000 units (50 mcg) tablet Take 2,000 Units by mouth daily         Prescribed medications have been administered regularly, without missed doses, and the medications have been tolerated well, without side effects.         Allergies:     Allergies   Allergen Reactions     Cats Itching           Problem list:     Patient Active Problem List    Diagnosis Date Noted     Proteinuria 01/10/2018     Priority: High     Depression,  "unspecified depression type 03/04/2022     Priority: Medium     Other systemic lupus erythematosus with other organ involvement (H) 07/31/2017     Priority: Medium     Anxiety 09/26/2016     Priority: Medium     Systemic lupus erythematosus (H) 09/10/2016     Priority: Medium     Arthritis (now improved), positive dsDNA, hypocomplementemia, Leydi positive (now negative).     No nephritis, ECHO and PFTs within normal limits    [facility]    Brain MRI essentially normal but suboptimal evaluation due to braces. Anti-neuronal antibody and ribosomal P antibodies negative.        Chronic abdominal pain 09/10/2016     Priority: Medium            Subjective:     Lorena is a 22 year old female who was seen in Pediatric Rheumatology clinic today for follow up of systemic lupus erythematosus (SLE). Lorena is unaccompanied today. At the last visit 3 months ago, she was doing well, and had notified me that she had been off all lupus medications for one year. Despite this, labs looked great. I recommended she restart hydroxychloroquine.     Today she reports doing very well in terms of her lupus. She says she \"feels great\". She has not been on the hydroxychloroquine. She did start it after the last visit, but eventually stopped taking it.     Her biggest concern today is her arthritis. She has a lot of pain is mostly in the knees and ankles and hands, bu also in the elbows, wrists, hips and low back. The pain is worst at night and can be very painful. 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 The nabumetone is not helping much, but she does think it helps better than the other NSAIDs she has tried. She does not think the hydroxychloroquine has any affect on her joint pain.     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 Did well. 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     A 14-point review of systems was negative except as follows: fatigue, " "night sweats, rashes (hives- she's used to them, they don't bother her much), anxiety, depression.            Examination:   Blood pressure 122/80, pulse 104, temperature 99.1  F (37.3  C), temperature source Tympanic, resp. rate 20, height 1.59 m (5' 2.6\"), weight 70.4 kg (155 lb 3.3 oz), SpO2 97 %.  Facility age limit for growth %misael is 20 years.  Growth %ile SmartLinks can only be used for patients less than 20 years old.  Gen: Pleasant, well-appearing, NAD  HEENT/Neck: TM's clear bilaterally, oropharynx is clear without lesions, neck is supple with no lymphadenopathy                  CV: Regular rate and rhythm, normal S1, S2, no murmurs  Resp: Clear to ascultation bilaterally  Abd: Soft, non-tender, non-distended, no hepatosplenomegaly  Skin: Clear, there is no rash, pronounced dermatographism on left thigh from scratching  MSK: All joints were examined including TMJ, sternoclavicular, acromioclavicular, neck, shoulder, elbow, wrist, hips, knees, ankles, fingers, and toes, and all were normal; diffuse hypermobility         Last Lab Results:     Office Visit on 05/11/2023   Component Date Value     C3 Complement 05/11/2023 118      C4 Complement 05/11/2023 17      Creatinine 05/11/2023 0.75      GFR Estimate 05/11/2023 >90      CRP Inflammation 05/11/2023 <3.00      DNA (ds) Antibody 05/11/2023 65.0 (H)      Protein Total 05/11/2023 7.4      Albumin 05/11/2023 4.1      Bilirubin Total 05/11/2023 0.4      Alkaline Phosphatase 05/11/2023 68      AST 05/11/2023 31      ALT 05/11/2023 42 (H)      Bilirubin Direct 05/11/2023 <0.20      Color Urine 05/11/2023 Light Yellow      Appearance Urine 05/11/2023 Clear      Glucose Urine 05/11/2023 Negative      Bilirubin Urine 05/11/2023 Negative      Ketones Urine 05/11/2023 Negative      Specific Gravity Urine 05/11/2023 1.016      Blood Urine 05/11/2023 Negative      pH Urine 05/11/2023 6.5      Protein Albumin Urine 05/11/2023 Negative      Urobilinogen Urine 05/11/2023 " Normal      Nitrite Urine 05/11/2023 Negative      Leukocyte Esterase Urine 05/11/2023 Negative      Bacteria Urine 05/11/2023 Few (A)      Mucus Urine 05/11/2023 Present (A)      RBC Urine 05/11/2023 1      WBC Urine 05/11/2023 <1      Squamous Epithelials Uri* 05/11/2023 2 (H)      Total Protein Urine mg/dL 05/11/2023 8.1      Total Protein UR MG/MG CR 05/11/2023 0.07      Creatinine Urine mg/dL 05/11/2023 121.8      TSH 05/11/2023 1.01      WBC Count 05/11/2023 6.0      RBC Count 05/11/2023 4.52      Hemoglobin 05/11/2023 13.9      Hematocrit 05/11/2023 41.5      MCV 05/11/2023 92      MCH 05/11/2023 30.8      MCHC 05/11/2023 33.5      RDW 05/11/2023 11.9      Platelet Count 05/11/2023 247      % Neutrophils 05/11/2023 58      % Lymphocytes 05/11/2023 31      % Monocytes 05/11/2023 9      % Eosinophils 05/11/2023 1      % Basophils 05/11/2023 0      % Immature Granulocytes 05/11/2023 1      NRBCs per 100 WBC 05/11/2023 0      Absolute Neutrophils 05/11/2023 3.5      Absolute Lymphocytes 05/11/2023 1.9      Absolute Monocytes 05/11/2023 0.5      Absolute Eosinophils 05/11/2023 0.1      Absolute Basophils 05/11/2023 0.0      Absolute Immature Granul* 05/11/2023 0.0      Absolute NRBCs 05/11/2023 0.0               Assessment:     Lorena is a 22 year old female with systemic lupus erythematosus. She is off all medications. I had recommended she stay on hydroxychloroquine, but she stopped if after the last visit. Generally she is doing very well and her lab work is reassuring. Her C3 and C4 are normal, but her dsDNA is rising. I recommend that she restart hydroxychloroquine in order to help prevent a disease flare.     Lorena's biggest concern is her joint pain which she attributes to arthritis. I have not seen arthritis on exam, but she does have diffuse joint hypermobility. We discussed that we can try methotrexate to see if her joint pain improves. If it does not, it may be that she has developed a chronic pain  syndrome from her lupus and she may benefit from chronic pain treatments such as gabapentin. She has tried physical therapy in the past and it has not helped so she is not interested in it at this time. She also has not noticed HCQ helping her joint pain.     We briefly discussed transition, and she will plan to transition to adult care in about one year. I recommended Dr. Ali Lerman.          Plan:     1. Monitoring labs were obtained today. The dsDNA is rising as discussed above. I recommend she restart hydroxychloroquine.   2. We will start methotrexate subcutaneous. We discussed the possibility of side effects.   3. She asked for a letter that says she has lupus because patients with chronic diseases can get a free national dav[place] pass.   4. We began to discuss transition to adult care.   5. If methotrexate does not help the joint pain, I will recommend she seek care for chronic pain, and consider using gabapentin, but I do not prescribe this.   6. I recommend annual eye exams while on hydroxychlorquine (Plaquenil).   7. Return in about 4 months (around 9/11/2023). Call sooner with any concerns.     If there are any new questions or concerns, I would be glad to help and can be reached through our main office at [phone] or our paging  at [phone].    53 minutes spent on the date of the encounter doing chart review, history and exam, documentation and further activities as noted above.     Petra Will MD  Pediatric Rheumatology  [facility]

## 2023-05-11 NOTE — LETTER
5/11/2023      RE: Lorena Fritz  [address] [facility] [address]     Dear Colleague,    Thank you for the opportunity to participate in the care of your patient, Lorena Fritz, at the [facility] at [facility]. Please see a copy of my visit note below.           Medications:   As of completion of this visit:  Current Outpatient Medications   Medication Sig Dispense Refill     folic acid (FOLVITE) 1 MG tablet Take 1 tablet (1 mg) by mouth daily 90 tablet 3     methotrexate 50 MG/2ML injection Inject 0.6 mLs (15 mg) Subcutaneous once a week 4 mL 3     AMOXICILLIN PO Take 1 tablet by mouth 1 tab daily for 10 days       Cobalamine Combinations (B12 FOLATE PO)  (Patient not taking: Reported on 8/18/2022)       coenzyme Q-10 75 MG CAPS Take 75 mg by mouth daily       diphenhydrAMINE (BENADRYL) 25 MG capsule Take by mouth as needed       escitalopram (LEXAPRO) 10 MG tablet Take 10 mg by mouth daily       Ferrous Sulfate (SLOW FE PO)  (Patient not taking: No sig reported)       hydroxychloroquine (PLAQUENIL) 200 MG tablet Take 1 tablet (200 mg) by mouth daily For systemic lupus erythematosus diaganosis 90 tablet 1     hydrOXYzine (ATARAX) 25 MG tablet Take by mouth as needed       levonorgestrel (KYLEENA) 19.5 MG IUD Kyleena 17.5 mcg/24 hrs (5yrs) 19.5mg intrauterine device   Take 1 device by intrauterine route.       nabumetone (RELAFEN) 500 MG tablet Take 2 tablets (1,000 mg) by mouth daily 180 tablet 2     ondansetron (ZOFRAN ODT) 4 MG ODT tab Take by mouth as needed       pantoprazole (PROTONIX) 40 MG EC tablet Take 1 tablet (40 mg) by mouth daily 90 tablet 2     prochlorperazine (COMPAZINE) 5 MG tablet Take 10 mg by mouth as needed       vitamin B-12 (CYANOCOBALAMIN) 1000 MCG tablet Take by mouth once a week       vitamin D3 (CHOLECALCIFEROL) 2000 units (50 mcg) tablet Take 2,000 Units by mouth daily         Prescribed  "medications have been administered regularly, without missed doses, and the medications have been tolerated well, without side effects.         Allergies:     Allergies   Allergen Reactions     Cats Itching           Problem list:     Patient Active Problem List    Diagnosis Date Noted     Proteinuria 01/10/2018     Priority: High     Depression, unspecified depression type 03/04/2022     Priority: Medium     Other systemic lupus erythematosus with other organ involvement (H) 07/31/2017     Priority: Medium     Anxiety 09/26/2016     Priority: Medium     Systemic lupus erythematosus (H) 09/10/2016     Priority: Medium     Arthritis (now improved), positive dsDNA, hypocomplementemia, Leydi positive (now negative).     No nephritis, ECHO and PFTs within normal limits    [facility])    Brain MRI essentially normal but suboptimal evaluation due to braces. Anti-neuronal antibody and ribosomal P antibodies negative.        Chronic abdominal pain 09/10/2016     Priority: Medium            Subjective:     Lorena is a 22 year old female who was seen in Pediatric Rheumatology clinic today for follow up of systemic lupus erythematosus (SLE). Lorena is unaccompanied today. At the last visit 3 months ago, she was doing well, and had notified me that she had been off all lupus medications for one year. Despite this, labs looked great. I recommended she restart hydroxychloroquine.     Today she reports doing very well in terms of her lupus. She says she \"feels great\". She has not been on the hydroxychloroquine. She did start it after the last visit, but eventually stopped taking it.     Her biggest concern today is her arthritis. She has a lot of pain is mostly in the knees and ankles and hands, bu also in the elbows, wrists, hips and low back. The pain is worst at night and can be very painful. 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 The " "nabumetone is not helping much, but she does think it helps better than the other NSAIDs she has tried. She does not think the hydroxychloroquine has any affect on her joint pain.     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 Did well. 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     A 14-point review of systems was negative except as follows: fatigue, night sweats, rashes (hives- she's used to them, they don't bother her much), anxiety, depression.            Examination:   Blood pressure 122/80, pulse 104, temperature 99.1  F (37.3  C), temperature source Tympanic, resp. rate 20, height 1.59 m (5' 2.6\"), weight 70.4 kg (155 lb 3.3 oz), SpO2 97 %.  Facility age limit for growth %misael is 20 years.  Growth %ile SmartLinks can only be used for patients less than 20 years old.  Gen: Pleasant, well-appearing, NAD  HEENT/Neck: TM's clear bilaterally, oropharynx is clear without lesions, neck is supple with no lymphadenopathy                  CV: Regular rate and rhythm, normal S1, S2, no murmurs  Resp: Clear to ascultation bilaterally  Abd: Soft, non-tender, non-distended, no hepatosplenomegaly  Skin: Clear, there is no rash, pronounced dermatographism on left thigh from scratching  MSK: All joints were examined including TMJ, sternoclavicular, acromioclavicular, neck, shoulder, elbow, wrist, hips, knees, ankles, fingers, and toes, and all were normal; diffuse hypermobility         Last Lab Results:     Office Visit on 05/11/2023   Component Date Value     C3 Complement 05/11/2023 118      C4 Complement 05/11/2023 17      Creatinine 05/11/2023 0.75      GFR Estimate 05/11/2023 >90      CRP Inflammation 05/11/2023 <3.00      DNA (ds) Antibody 05/11/2023 65.0 (H)      Protein Total 05/11/2023 7.4      Albumin 05/11/2023 4.1      Bilirubin Total 05/11/2023 0.4      Alkaline Phosphatase 05/11/2023 68      AST 05/11/2023 31      ALT 05/11/2023 42 (H)      Bilirubin Direct 05/11/2023 <0.20      Color Urine 05/11/2023 Light Yellow  "     Appearance Urine 05/11/2023 Clear      Glucose Urine 05/11/2023 Negative      Bilirubin Urine 05/11/2023 Negative      Ketones Urine 05/11/2023 Negative      Specific Gravity Urine 05/11/2023 1.016      Blood Urine 05/11/2023 Negative      pH Urine 05/11/2023 6.5      Protein Albumin Urine 05/11/2023 Negative      Urobilinogen Urine 05/11/2023 Normal      Nitrite Urine 05/11/2023 Negative      Leukocyte Esterase Urine 05/11/2023 Negative      Bacteria Urine 05/11/2023 Few (A)      Mucus Urine 05/11/2023 Present (A)      RBC Urine 05/11/2023 1      WBC Urine 05/11/2023 <1      Squamous Epithelials Uri* 05/11/2023 2 (H)      Total Protein Urine mg/dL 05/11/2023 8.1      Total Protein UR MG/MG CR 05/11/2023 0.07      Creatinine Urine mg/dL 05/11/2023 121.8      TSH 05/11/2023 1.01      WBC Count 05/11/2023 6.0      RBC Count 05/11/2023 4.52      Hemoglobin 05/11/2023 13.9      Hematocrit 05/11/2023 41.5      MCV 05/11/2023 92      MCH 05/11/2023 30.8      MCHC 05/11/2023 33.5      RDW 05/11/2023 11.9      Platelet Count 05/11/2023 247      % Neutrophils 05/11/2023 58      % Lymphocytes 05/11/2023 31      % Monocytes 05/11/2023 9      % Eosinophils 05/11/2023 1      % Basophils 05/11/2023 0      % Immature Granulocytes 05/11/2023 1      NRBCs per 100 WBC 05/11/2023 0      Absolute Neutrophils 05/11/2023 3.5      Absolute Lymphocytes 05/11/2023 1.9      Absolute Monocytes 05/11/2023 0.5      Absolute Eosinophils 05/11/2023 0.1      Absolute Basophils 05/11/2023 0.0      Absolute Immature Granul* 05/11/2023 0.0      Absolute NRBCs 05/11/2023 0.0               Assessment:     Lorena is a 22 year old female with systemic lupus erythematosus. She is off all medications. I had recommended she stay on hydroxychloroquine, but she stopped if after the last visit. Generally she is doing very well and her lab work is reassuring. Her C3 and C4 are normal, but her dsDNA is rising. I recommend that she restart hydroxychloroquine in  order to help prevent a disease flare.     Lorena's biggest concern is her joint pain which she attributes to arthritis. I have not seen arthritis on exam, but she does have diffuse joint hypermobility. We discussed that we can try methotrexate to see if her joint pain improves. If it does not, it may be that she has developed a chronic pain syndrome from her lupus and she may benefit from chronic pain treatments such as gabapentin. She has tried physical therapy in the past and it has not helped so she is not interested in it at this time. She also has not noticed HCQ helping her joint pain.     We briefly discussed transition, and she will plan to transition to adult care in about one year. I recommended Dr. Ali Lerman.          Plan:     Monitoring labs were obtained today. The dsDNA is rising as discussed above. I recommend she restart hydroxychloroquine.   We will start methotrexate subcutaneous. We discussed the possibility of side effects.   She asked for a letter that says she has lupus because patients with chronic diseases can get a free national davison pass.   We began to discuss transition to adult care.   If methotrexate does not help the joint pain, I will recommend she seek care for chronic pain, and consider using gabapentin, but I do not prescribe this.   I recommend annual eye exams while on hydroxychlorquine (Plaquenil).   Return in about 4 months (around 9/11/2023). Call sooner with any concerns.     If there are any new questions or concerns, I would be glad to help and can be reached through our main office at [phone] or our paging  at [phone].    53 minutes spent on the date of the encounter doing chart review, history and exam, documentation and further activities as noted above.     Petra Will MD  Pediatric Rheumatology  [facility]

## 2023-05-11 NOTE — PATIENT INSTRUCTIONS
We will start methotrexate. Start week one with 10 mg (0.4 ml), then the next week increase to 0.5 ml (12.5 mg), then week three 0.6 ml (15 mg). This will be your full dose, but if nauseous on the higher dose, we can go back down. Our nurses will discuss with you how to draw it up and inject it.     Take folic acid everyday.       I recommend that you transition to Dr. Ali Lerman in adult rheumatology at the  of .     For Patient Education Materials:  z.[facility]/fo       [facility] Pediatric Rheumatology    For Help:  The Pediatric Call Center at [phone] can help with scheduling of routine follow up visits.  Yana Huerta and Alcira Roe are the Nurse Coordinators for the Division of Pediatric Rheumatology and can be reached by phone at [phone] or through [facility] ([email or web address]). They can help with questions about your child s rheumatic condition, medications, and test results.  For emergencies after hours or on the weekends, please call the page  at [phone] and ask to speak to the physician on-call for Pediatric Rheumatology. Please do not use [facility] for urgent requests.  Main  Services:  [phone]  Hmong/Jerad/Keegan: [phone]  Saudi Arabian: [phone]  Welsh: [phone]    Internal Referrals: If we refer your child to another physician/team within [facility], you should receive a call to set this up. If you do not hear anything within a week, please call the Call Center at [phone].    External Referrals: If we refer your child to a physician/team outside of [facility], our team will send the referral order and relevant records to them. We ask that you call the place where your child is being referred to ensure they received the needed information and notify our team coordinators if not.    Imaging: If your child needs an imaging study that is not being performed the day of your clinic appointment, please call to set  this up. For xrays, ultrasounds, and echocardiogram call [phone]. For CT or MRI call [phone].     MyChart: We encourage you to sign up for MyChart at [email or web address]. For assistance or questions, call [phone]. If your child is 12 years or older, a consent for proxy/parent access needs to be signed so please discuss this with your physician at the next visit.

## 2023-05-12 LAB
C3 SERPL-MCNC: 118 MG/DL (ref 81–157)
DSDNA AB SER-ACNC: 65 IU/ML

## 2023-05-15 DIAGNOSIS — M32.9 SYSTEMIC LUPUS ERYTHEMATOSUS, UNSPECIFIED SLE TYPE, UNSPECIFIED ORGAN INVOLVEMENT STATUS (H): ICD-10-CM

## 2023-05-15 LAB — C4 SERPL-MCNC: 17 MG/DL (ref 13–39)

## 2023-05-15 RX ORDER — NABUMETONE 500 MG/1
1000 TABLET, FILM COATED ORAL DAILY
Qty: 180 TABLET | Refills: 2 | Status: SHIPPED | OUTPATIENT
Start: 2023-05-15 | End: 2023-12-13

## 2023-05-17 ENCOUNTER — TELEPHONE (OUTPATIENT)
Dept: RHEUMATOLOGY | Facility: CLINIC | Age: 23
End: 2023-05-17
Payer: COMMERCIAL

## 2023-05-17 RX ORDER — CALCIUM CARB/VITAMIN D3/VIT K1 500-100-40
TABLET,CHEWABLE ORAL
Qty: 100 EACH | Refills: 11 | Status: SHIPPED | OUTPATIENT
Start: 2023-05-17 | End: 2023-12-13

## 2023-05-17 NOTE — TELEPHONE ENCOUNTER
----- Message from Petra Will MD sent at 5/17/2023  9:36 AM CDT -----  Regarding: review subcutaneous methotrexate  Can you please review with Lorena how to give subcutaneous methotrexate? She's a long-time patient for lupus but has never been on injections.     Also, can you please let her know that her dsDNA is rising and therefore I'm concerned she might flare. I recommend she restart HCQ. Can you please let her know that her TSH is normal-- checked because she has night sweats.     Thanks,  Petra

## 2023-05-17 NOTE — TELEPHONE ENCOUNTER
Spoke to Lorena re methotrexate injections. Also discussed labs- she had no concerns about restarting HCQ. All questions answered.     Pediatric Rheumatology Nurse Teaching:    Learners: Lorena    Barriers to learning: none    Medications: methotrexate 0.6 mls subcutaneous weekly    Reviewed dose, frequency, side effects and storage.    Injection: Reviewed how to draw up medication/use injection device, appropriate injection sites, how to give a subcutaneous injection, and how to dispose of syringe/needle/device. Provided our website information for Pediatric Rheumatology and had family view this information for teaching. Relevant handouts given to family in clinic by provider.     Reviewed when family should call with concerns/questions. Answered family's questions. Verified family has office phone #.

## 2023-06-17 ENCOUNTER — MYC MEDICAL ADVICE (OUTPATIENT)
Dept: RHEUMATOLOGY | Facility: CLINIC | Age: 23
End: 2023-06-17
Payer: COMMERCIAL

## 2023-06-18 ENCOUNTER — TELEPHONE (OUTPATIENT)
Dept: RHEUMATOLOGY | Facility: CLINIC | Age: 23
End: 2023-06-18
Payer: COMMERCIAL

## 2023-06-19 DIAGNOSIS — M32.9 SYSTEMIC LUPUS ERYTHEMATOSUS, UNSPECIFIED SLE TYPE, UNSPECIFIED ORGAN INVOLVEMENT STATUS (H): Primary | ICD-10-CM

## 2023-06-19 NOTE — TELEPHONE ENCOUNTER
Pediatric Rheumatology Phone Consult    Caller: Lorena Fritz  Location: home  Date: 06/18/23  Time: 9:07 AM  Callback number: [phone]    Question/Discussion: tick bite - do I need to go to be tested?    Lorena is a 22 y.o. female with SLE, followed by Dr. Will.     She notes that she found a tick attached to her this morning, and promptly removed it. She is concerned that she will have to be tested for tick-borne illnesses now, especially since she is immunocompromised.     She noted that she was able to remove the entire tick (including head), that it was definitively a wood tick (she knows the difference between deer and wood tick), and that at most, it was attached for <24 hours. She has no symptoms. She is currently taking methotrexate and hydroxychloroquine for her SLE.     Recommendations: Based on the limited information provided on the phone we advised the following recommendations:    Risk factors requiring empiric treatment for a tick bite would include known deer tick bite that occurs >36 hours (by known time or by estimated engorgement of tick). Neither of these apply to Lorena, and she remains asymptomatic.     Therefore, she does not need to go in. We advised that she ensure the tick bite wound area is thoroughly cleaned. If she develops abnormal rash, fevers, or other symptoms, that would merit evaluation.     Last, her current medications are not immune compromising so would not change our recommendations for tick bite care beyond standard recommendations.     Discussed with Dr. Radha Aguilera.    Rogerio Vang MD/PhD  PGY5, Pediatric Rheumatology Fellow  [facility]    Reviewed with Fellow at the time of call as well as the note above and agree.    Dolores Aguilera M.D.   of Pediatrics  Pediatric Rheumatology

## 2023-07-05 ENCOUNTER — TELEPHONE (OUTPATIENT)
Dept: RHEUMATOLOGY | Facility: CLINIC | Age: 23
End: 2023-07-05
Payer: COMMERCIAL

## 2023-07-05 DIAGNOSIS — M32.9 SYSTEMIC LUPUS ERYTHEMATOSUS, UNSPECIFIED SLE TYPE, UNSPECIFIED ORGAN INVOLVEMENT STATUS (H): Primary | ICD-10-CM

## 2023-07-05 NOTE — TELEPHONE ENCOUNTER
"I rec'd a page to the on call pager from Lavern.     Question: lavern is very very concerned about the increase in her DSDNA to \"299\". She let me know she takes hydroxychloroquine but has not been on mycophenolate for years. At the time of our conversation I did not have access to her labs as they were in [facility]. She told me that the urine test and \"cbc\" all look normal.     Recommendations: I reassured her that although this is an important jones, its not urgent and that she does not need to take action tonight. I let her know Dr. Will would wait to get the complement levels back and then make a plan with lavern as to next steps. She still felt anxious at the end of the call but I reassured her that dr. will would call her.     I reviewed her labs in [facility] and the C3 and C4 are still not back as of 7/5. I am not certain if they are not resulted in the outside lab or if they were not done.     Routing to Dr. Will for review on next steps.   Routing to [facility] so they are aware of the call in case Lavern calls again.   "

## 2023-07-07 DIAGNOSIS — M32.9 SYSTEMIC LUPUS ERYTHEMATOSUS, UNSPECIFIED SLE TYPE, UNSPECIFIED ORGAN INVOLVEMENT STATUS (H): Primary | ICD-10-CM

## 2023-07-07 RX ORDER — MYCOPHENOLATE MOFETIL 250 MG/1
250 CAPSULE ORAL 2 TIMES DAILY
Qty: 60 CAPSULE | Refills: 5 | Status: SHIPPED | OUTPATIENT
Start: 2023-07-07 | End: 2023-07-07

## 2023-07-07 RX ORDER — MYCOPHENOLATE MOFETIL 250 MG/1
250 CAPSULE ORAL 2 TIMES DAILY
Qty: 60 CAPSULE | Refills: 5 | Status: SHIPPED | OUTPATIENT
Start: 2023-07-07 | End: 2023-12-13

## 2023-08-15 NOTE — PROGRESS NOTES
Rheumatologic History:    Primary rheumatologic diagnosis:systemic lupus erythematosus  Date of symptom onset:2015  Date of first visit to center:16  Date of diagnosis:16       Presenting clinical features included:  Abdominal pain, diarrhea, weight loss, lymphadenopathy elevated ESR    Lab evaluation at onset notable for:   dsDNA>1000, KAIA positive, SSA 71.1 (normal <20), SSB 5.5, Sm 12, U1-RNP/Sm 86.4, Scl-70 2.3, Amber-1 3.6, TSH normal, repeat CBC on 16 was normal, cryptosporidium/giardia negative, ova and parasites negative, Q fever negative, HIV negative, blood culture no growth, cryptococcus negative, CK 27, C3 54 (), C4 3.2, IgA 262.0, IgG 1760 (734-1570), IgM 57.4 (normal), haptoglobin normal, RF negative, beta-2-glycoprotein IgG/IgM negative, cardiolipin IgG 12 (normal up to 10), cardiolipin IgM <4, direct Leydi positive, TICO anti-C3 negative, DRVVT ration normal     Notable imaging or pathology:   GI who performed upper and lower endoscopies with biopsies. Biopsies showed only mild inflammation in the duodenum and colon. She also had an MR enterography on 16 which did not show bowel disease but did show mildly prominent adenopathy in several areas.     Treatment history:   IVMP 30 mg/kg once during hospitalization, then 30 mg prednisone BID, tapered over several months; mycophenolate, hydroxychloroquine      Health Maintenance:    Last Eye Examination: unknown  Last Pulmonary Screening: PFT /DLCO:2016.  Chest imagin2016.   Last Cardiovascular Screening: ECHO:2022. EK2022. Lipid panel (should be every 3-5 years):uknown.  Last Bone Health Screening: DEXA: not yet indicated none.  Last Neuropsych Testing:3/14/22.  Last TB screen:  2022    Last influenza vaccination:2022  Last COVID vaccination:3/2021   Last pneumococcal vaccination: (which one, PCV23)2016  Last shingles vaccination (if over age 19):none      Subjective:    Lorena is a 22 year old female who was seen  "in Pediatric Rheumatology clinic today for follow up of systemic lupus erythematosus (SLE). Lorena was last seen in our clinic on 5/11/2023 and returns today unaccompanied.      Since Lorena s last visit, has been taking hydroxychloroquine and mycophenolate. She has been taking 500 once daily. Overall has been doing well. Had a bad cold, COVID was negative. [place]. Overall feels really good. Excited to go back to college at [employer].     No major concerns.     Lorena has been receiving and tolerating medications well, without missed doses or notable side effects.     Healthcare Transitions:  Transitioning? yes  Target age to transition: 22  Adult provider selected? yes  Medical Summary created? no  Communication with adult provider? no    Transition topics covered today included: Adult provider planning    - Example prompts:   \"How do you talk to other people/other doctors about your lupus?\"   \"Do you think you could 'tell the story' of your lupus to another doctor if you needed to? How would you tell it? Think about writing this down in your phone.\"  \"Can you name all the medications you take, and why you take them?\"  \"Where would you go if you had a medical emergency? How would you get there?\"  \"What goals do you have for the upcoming year unrelated to your lupus or medical care?\"         Review of 14 systems is negative other than noted above.     Problem List:     Patient Active Problem List    Diagnosis Date Noted    Proteinuria 01/10/2018     Priority: High    Depression, unspecified depression type 03/04/2022     Priority: Medium    Other systemic lupus erythematosus with other organ involvement (H) 07/31/2017     Priority: Medium    Anxiety 09/26/2016     Priority: Medium    Systemic lupus erythematosus (H) 09/10/2016     Priority: Medium     At diagnosis: Lorena is a 15 year old girl with history of chronic abdominal pain, diarrhea and weight loss,, lymphadenopathy, elevated inflammatory markers and recurrent " "fevers with new diagnosis of systemic lupus erythematosis (SLE) after admission and work-up performed at [facility] and [facility]. Criteria consistent with SLE include arthritis, Leydi positive anemia, positive KAIA, and positive dsDNA. She also has positive anti-Ro and anti-RNP antibody but is anti-Smith negative. Her C3 and C4 are both low, with C4 being undetectable. Given one dose of IVMP 30 mg/kg, then oral prednisone 30 mg BID. Eventually tapered off. Mycophenolate and hydroxychloroquine ongoing.     Arthritis (now improved), positive dsDNA, hypocomplementemia, Leydi positive (now negative).     No nephritis, ECHO and PFTs within normal limits    [facility])    Brain MRI essentially normal but suboptimal evaluation due to braces. Anti-neuronal antibody and ribosomal P antibodies negative.       Chronic abdominal pain 09/10/2016     Priority: Medium        Medications:     Current Outpatient Medications   Medication Sig Dispense Refill    AMOXICILLIN PO Take 1 tablet by mouth 1 tab daily for 10 days      Cobalamine Combinations (B12 FOLATE PO)  (Patient not taking: Reported on 8/18/2022)      coenzyme Q-10 75 MG CAPS Take 75 mg by mouth daily      diphenhydrAMINE (BENADRYL) 25 MG capsule Take by mouth as needed      escitalopram (LEXAPRO) 10 MG tablet Take 10 mg by mouth daily      Ferrous Sulfate (SLOW FE PO)  (Patient not taking: No sig reported)      folic acid (FOLVITE) 1 MG tablet Take 1 tablet (1 mg) by mouth daily 90 tablet 3    hydroxychloroquine (PLAQUENIL) 200 MG tablet Take 1 tablet (200 mg) by mouth daily For systemic lupus erythematosus diaganosis 90 tablet 1    hydrOXYzine (ATARAX) 25 MG tablet Take by mouth as needed      insulin syringe 31G X 5/16\" 1 ML MISC Use as directed to give methotrexate. 100 each 11    levonorgestrel (KYLEENA) 19.5 MG IUD Kyleena 17.5 mcg/24 hrs (5yrs) 19.5mg intrauterine device   Take 1 device by " "intrauterine route.      methotrexate 50 MG/2ML injection Inject 0.6 mLs (15 mg) Subcutaneous once a week 4 mL 3    mycophenolate (GENERIC EQUIVALENT) 250 MG capsule Take 1 capsule (250 mg) by mouth 2 times daily 60 capsule 5    nabumetone (RELAFEN) 500 MG tablet Take 2 tablets (1,000 mg) by mouth daily 180 tablet 2    ondansetron (ZOFRAN ODT) 4 MG ODT tab Take by mouth as needed      pantoprazole (PROTONIX) 40 MG EC tablet Take 1 tablet (40 mg) by mouth daily 90 tablet 2    prochlorperazine (COMPAZINE) 5 MG tablet Take 10 mg by mouth as needed      vitamin B-12 (CYANOCOBALAMIN) 1000 MCG tablet Take by mouth once a week      vitamin D3 (CHOLECALCIFEROL) 2000 units (50 mcg) tablet Take 2,000 Units by mouth daily          Allergies:     Allergies   Allergen Reactions    Cats Itching        Social History:     Social History     Social History Narrative    Lorena lives with her parents in [place]. She attends [employer] and participates in dance, golf, and \"Mock trial\". She is in the 10th grade and has a brother who is healthy.          Physical Examination:   Blood pressure 111/74, pulse 100, temperature 98.9  F (37.2  C), temperature source Oral, resp. rate 20, height 1.588 m (5' 2.52\"), weight 72.7 kg (160 lb 4.4 oz), SpO2 98 %.  Facility age limit for growth %misael is 20 years.  Growth %ile SmartLinks can only be used for patients less than 20 years old.  Body surface area is 1.79 meters squared.    Constitutional: awake, alert, cooperative, no apparent distress, and appears stated age.  Head: Normal head and hair pattern, no alopecia.  Eyes: Lids and lashes normal, pupils equal, round and reactive to light, extra ocular muscles intact, sclera clear, conjunctiva normal.  Ears: External ears without lesions, ear canals normal. TM non-erythematous, not bulging bilaterally.  ENT: Sinuses nontender on palpation, oral pharynx with moist mucous membranes, tonsils without erythema or exudates, gums normal and " good dentition, normal salivary pool.  Hematologic / Lymphatic: no cervical, supraclavicular, or axillary lymphadenopathy.  Respiratory: No increased work of breathing, good air exchange, clear to auscultation bilaterally without crackles or wheezing.  Cardiovascular: Regular rate and rhythm, normal S1 and S2, no S3 or S4, and no murmur noted.  GI: Normal bowel sounds, soft, non-distended, non-tender, no masses palpated, no hepatosplenomegaly.  Skin: no bruising or bleeding, normal skin color, texture, turgor, no rashes and no lesions.  Musculoskeletal: No evidence of current synovitis/arthritis of the cervical spine, TMJ, sternoclavicular, acromioclavicular, glenohumeral, elbow, wrists, finger, sacroiliac, hip, knee, ankle, or toe joints. No tendonitis or bursitis. No enthesitis.  No leg length discrepancy. Gait is normal with walking and running.  Neurologic: Awake, alert, oriented to name, place and time. Cranial nerves II-XII are grossly intact.  Motor is 5 out of 5 bilaterally. Sensory is intact. Tone is normal.  Neuropsychiatric: General: normal, calm and normal eye contact.    Assessment:   Lorena is a 22 year old female with systemic lupus erythematosus who presents today for follow-up. She had self-discontinued her medications for one year, but recently (approximately 6 weeks ago) restarted them when she was concerned about her dsDNA increasing. She is otherwise doing well. I had restarted her on a low dose of mycophenolate since she had been doing so well (250 mg BID), but she has been taking 500 mg once daily because she cannot remember to take it at night We discussed that although this is not the ideal way to take the mycophenolate 500 mg once daily is better than 250 once daily, so I agreed this was ok. She also takes methotrexate and nabumetone for arthritis and this helps her.     Of note, her dsDNA has risen again to 89, from 65, but the rest of her labs are normal. I recommend we give her current  treatment plan more time and reassess labs at her next visit.     Given chronic back pain, we obtained a lumbar x-ray and this was reassuring.      Plan:   Laboratory testing every 3 months, to monitor medications and disease activity.    No planned labs prior to next visit.  No imaging is needed today. Try methotrexate in the morning.   No new referrals made today.  Medications: As listed. Changes made today: none.  I recommend annual eye exams while on hydroxychlorquine (Plaquenil).  Return visit: Return in about 3 months (around 11/17/2023). Call sooner with any concerns.     If there are any new questions or concerns, I would be glad to help and can be reached through our main office at [phone] or our paging  at [phone].     30 minutes spent on the date of the encounter doing chart review, history and exam, documentation and further activities as noted above.   Petra Will MD  Pediatric Rheumatology             Addendum:  Laboratory Investigations:   Laboratory investigations performed today for which results were available at the time of this note are listed below. Pending labs will be reported in a separate letter.  Office Visit on 08/17/2023   Component Date Value    C3 Complement 08/17/2023 116     C4 Complement 08/17/2023 20     Creatinine 08/17/2023 0.61     GFR Estimate 08/17/2023 >90     CRP Inflammation 08/17/2023 <3.00     DNA (ds) Antibody 08/17/2023 89.0 (H)     Protein Total 08/17/2023 7.0     Albumin 08/17/2023 4.1     Bilirubin Total 08/17/2023 0.5     Alkaline Phosphatase 08/17/2023 60     AST 08/17/2023 29     ALT 08/17/2023 30     Bilirubin Direct 08/17/2023 <0.20     Color Urine 08/17/2023 Yellow     Appearance Urine 08/17/2023 Clear     Glucose Urine 08/17/2023 Negative     Bilirubin Urine 08/17/2023 Negative     Ketones Urine 08/17/2023 Negative     Specific Gravity Urine 08/17/2023 1.024     Blood Urine 08/17/2023 Negative     pH Urine 08/17/2023 7.5 (H)     Protein  Albumin Urine 08/17/2023 20 (A)     Urobilinogen Urine 08/17/2023 Normal     Nitrite Urine 08/17/2023 Negative     Leukocyte Esterase Urine 08/17/2023 Negative     Bacteria Urine 08/17/2023 Few (A)     Mucus Urine 08/17/2023 Present (A)     RBC Urine 08/17/2023 <1     WBC Urine 08/17/2023 1     Squamous Epithelials Uri* 08/17/2023 2 (H)     Total Protein Urine mg/dL 08/17/2023 10.2     Total Protein UR MG/MG CR 08/17/2023 0.07     Creatinine Urine mg/dL 08/17/2023 141.2     WBC Count 08/17/2023 5.8     RBC Count 08/17/2023 4.36     Hemoglobin 08/17/2023 13.9     Hematocrit 08/17/2023 41.9     MCV 08/17/2023 96     MCH 08/17/2023 31.9     MCHC 08/17/2023 33.2     RDW 08/17/2023 12.6     Platelet Count 08/17/2023 252     % Neutrophils 08/17/2023 65     % Lymphocytes 08/17/2023 25     % Monocytes 08/17/2023 8     % Eosinophils 08/17/2023 1     % Basophils 08/17/2023 0     % Immature Granulocytes 08/17/2023 1     NRBCs per 100 WBC 08/17/2023 0     Absolute Neutrophils 08/17/2023 3.8     Absolute Lymphocytes 08/17/2023 1.5     Absolute Monocytes 08/17/2023 0.5     Absolute Eosinophils 08/17/2023 0.1     Absolute Basophils 08/17/2023 0.0     Absolute Immature Granul* 08/17/2023 0.0     Absolute NRBCs 08/17/2023 0.0             Recent Results (from the past 744 hour(s))   XR Lumbar Spine 2/3 Views    Narrative    EXAM: XR LUMBAR SPINE 2/3 VIEWS  LOCATION: [facility]  DATE: 8/17/2023    INDICATION: Back pain  COMPARISON: None available at time of dictation.      Impression    IMPRESSION: Straightening and subtle levoconvex curvature of the lumbar spine without significant spondylolisthesis. No acute fracture. No significant degenerative change. Intrauterine device projecting over the pelvis.   XR Pelvis 1/2 Views    Narrative    EXAM: XR PELVIS 1/2 VIEWS  LOCATION: [facility]  DATE: 8/17/2023    INDICATION:  Systemic lupus  erythematosus, unspecified SLE type, unspecified organ involvement status (H)  COMPARISON: None.      Impression    IMPRESSION: Both hips negative for fracture. Pelvis negative for fracture. Intrauterine device.

## 2023-08-17 ENCOUNTER — HOSPITAL ENCOUNTER (OUTPATIENT)
Dept: GENERAL RADIOLOGY | Facility: CLINIC | Age: 23
Discharge: HOME | End: 2023-08-17
Attending: INTERNAL MEDICINE
Payer: COMMERCIAL

## 2023-08-17 ENCOUNTER — OFFICE VISIT (OUTPATIENT)
Dept: RHEUMATOLOGY | Facility: CLINIC | Age: 23
End: 2023-08-17
Attending: INTERNAL MEDICINE
Payer: COMMERCIAL

## 2023-08-17 ENCOUNTER — DOCUMENTATION ONLY (OUTPATIENT)
Dept: RHEUMATOLOGY | Facility: CLINIC | Age: 23
End: 2023-08-17

## 2023-08-17 VITALS
DIASTOLIC BLOOD PRESSURE: 74 MMHG | BODY MASS INDEX: 28.4 KG/M2 | SYSTOLIC BLOOD PRESSURE: 111 MMHG | WEIGHT: 160.27 LBS | TEMPERATURE: 98.9 F | HEART RATE: 100 BPM | HEIGHT: 63 IN | OXYGEN SATURATION: 98 % | RESPIRATION RATE: 20 BRPM

## 2023-08-17 DIAGNOSIS — M32.9 SYSTEMIC LUPUS ERYTHEMATOSUS, UNSPECIFIED SLE TYPE, UNSPECIFIED ORGAN INVOLVEMENT STATUS (H): Primary | ICD-10-CM

## 2023-08-17 DIAGNOSIS — M32.9 SYSTEMIC LUPUS ERYTHEMATOSUS, UNSPECIFIED SLE TYPE, UNSPECIFIED ORGAN INVOLVEMENT STATUS (H): ICD-10-CM

## 2023-08-17 LAB
ALBUMIN MFR UR ELPH: 10.2 MG/DL
ALBUMIN SERPL BCG-MCNC: 4.1 G/DL (ref 3.5–5.2)
ALBUMIN UR-MCNC: 20 MG/DL
ALP SERPL-CCNC: 60 U/L (ref 35–104)
ALT SERPL W P-5'-P-CCNC: 30 U/L (ref 0–50)
APPEARANCE UR: CLEAR
AST SERPL W P-5'-P-CCNC: 29 U/L (ref 0–45)
BACTERIA #/AREA URNS HPF: ABNORMAL /HPF
BASOPHILS # BLD AUTO: 0 10E3/UL (ref 0–0.2)
BASOPHILS NFR BLD AUTO: 0 %
BILIRUB DIRECT SERPL-MCNC: <0.2 MG/DL (ref 0–0.3)
BILIRUB SERPL-MCNC: 0.5 MG/DL
BILIRUB UR QL STRIP: NEGATIVE
COLOR UR AUTO: YELLOW
CREAT SERPL-MCNC: 0.61 MG/DL (ref 0.51–0.95)
CREAT UR-MCNC: 141.2 MG/DL
CRP SERPL-MCNC: <3 MG/L
EOSINOPHIL # BLD AUTO: 0.1 10E3/UL (ref 0–0.7)
EOSINOPHIL NFR BLD AUTO: 1 %
ERYTHROCYTE [DISTWIDTH] IN BLOOD BY AUTOMATED COUNT: 12.6 % (ref 10–15)
GFR SERPL CREATININE-BSD FRML MDRD: >90 ML/MIN/1.73M2
GLUCOSE UR STRIP-MCNC: NEGATIVE MG/DL
HCT VFR BLD AUTO: 41.9 % (ref 35–47)
HGB BLD-MCNC: 13.9 G/DL (ref 11.7–15.7)
HGB UR QL STRIP: NEGATIVE
IMM GRANULOCYTES # BLD: 0 10E3/UL
IMM GRANULOCYTES NFR BLD: 1 %
KETONES UR STRIP-MCNC: NEGATIVE MG/DL
LEUKOCYTE ESTERASE UR QL STRIP: NEGATIVE
LYMPHOCYTES # BLD AUTO: 1.5 10E3/UL (ref 0.8–5.3)
LYMPHOCYTES NFR BLD AUTO: 25 %
MCH RBC QN AUTO: 31.9 PG (ref 26.5–33)
MCHC RBC AUTO-ENTMCNC: 33.2 G/DL (ref 31.5–36.5)
MCV RBC AUTO: 96 FL (ref 78–100)
MONOCYTES # BLD AUTO: 0.5 10E3/UL (ref 0–1.3)
MONOCYTES NFR BLD AUTO: 8 %
MUCOUS THREADS #/AREA URNS LPF: PRESENT /LPF
NEUTROPHILS # BLD AUTO: 3.8 10E3/UL (ref 1.6–8.3)
NEUTROPHILS NFR BLD AUTO: 65 %
NITRATE UR QL: NEGATIVE
NRBC # BLD AUTO: 0 10E3/UL
NRBC BLD AUTO-RTO: 0 /100
PH UR STRIP: 7.5 [PH] (ref 5–7)
PLATELET # BLD AUTO: 252 10E3/UL (ref 150–450)
PROT SERPL-MCNC: 7 G/DL (ref 6.4–8.3)
PROT/CREAT 24H UR: 0.07 MG/MG CR (ref 0–0.2)
RBC # BLD AUTO: 4.36 10E6/UL (ref 3.8–5.2)
RBC URINE: <1 /HPF
SP GR UR STRIP: 1.02 (ref 1–1.03)
SQUAMOUS EPITHELIAL: 2 /HPF
UROBILINOGEN UR STRIP-MCNC: NORMAL MG/DL
WBC # BLD AUTO: 5.8 10E3/UL (ref 4–11)
WBC URINE: 1 /HPF

## 2023-08-17 PROCEDURE — 99214 OFFICE O/P EST MOD 30 MIN: CPT | Performed by: INTERNAL MEDICINE

## 2023-08-17 PROCEDURE — 86225 DNA ANTIBODY NATIVE: CPT | Performed by: INTERNAL MEDICINE

## 2023-08-17 PROCEDURE — 81001 URINALYSIS AUTO W/SCOPE: CPT | Performed by: INTERNAL MEDICINE

## 2023-08-17 PROCEDURE — 84155 ASSAY OF PROTEIN SERUM: CPT | Performed by: INTERNAL MEDICINE

## 2023-08-17 PROCEDURE — 84156 ASSAY OF PROTEIN URINE: CPT | Performed by: INTERNAL MEDICINE

## 2023-08-17 PROCEDURE — 72100 X-RAY EXAM L-S SPINE 2/3 VWS: CPT

## 2023-08-17 PROCEDURE — 82565 ASSAY OF CREATININE: CPT | Performed by: INTERNAL MEDICINE

## 2023-08-17 PROCEDURE — 72170 X-RAY EXAM OF PELVIS: CPT

## 2023-08-17 PROCEDURE — 86160 COMPLEMENT ANTIGEN: CPT | Performed by: INTERNAL MEDICINE

## 2023-08-17 PROCEDURE — 86140 C-REACTIVE PROTEIN: CPT | Performed by: INTERNAL MEDICINE

## 2023-08-17 PROCEDURE — 82248 BILIRUBIN DIRECT: CPT | Performed by: INTERNAL MEDICINE

## 2023-08-17 PROCEDURE — 85025 COMPLETE CBC W/AUTO DIFF WBC: CPT | Performed by: INTERNAL MEDICINE

## 2023-08-17 PROCEDURE — 36415 COLL VENOUS BLD VENIPUNCTURE: CPT | Performed by: INTERNAL MEDICINE

## 2023-08-17 ASSESSMENT — PAIN SCALES - GENERAL: PAINLEVEL: SEVERE PAIN (6)

## 2023-08-17 ASSESSMENT — PATIENT HEALTH QUESTIONNAIRE - PHQ9
SUM OF ALL RESPONSES TO PHQ QUESTIONS 1-9: 10
5. POOR APPETITE OR OVEREATING: MORE THAN HALF THE DAYS

## 2023-08-17 ASSESSMENT — ANXIETY QUESTIONNAIRES
5. BEING SO RESTLESS THAT IT IS HARD TO SIT STILL: MORE THAN HALF THE DAYS
1. FEELING NERVOUS, ANXIOUS, OR ON EDGE: SEVERAL DAYS
7. FEELING AFRAID AS IF SOMETHING AWFUL MIGHT HAPPEN: NOT AT ALL
GAD7 TOTAL SCORE: 9
3. WORRYING TOO MUCH ABOUT DIFFERENT THINGS: MORE THAN HALF THE DAYS
2. NOT BEING ABLE TO STOP OR CONTROL WORRYING: SEVERAL DAYS
GAD7 TOTAL SCORE: 9
IF YOU CHECKED OFF ANY PROBLEMS ON THIS QUESTIONNAIRE, HOW DIFFICULT HAVE THESE PROBLEMS MADE IT FOR YOU TO DO YOUR WORK, TAKE CARE OF THINGS AT HOME, OR GET ALONG WITH OTHER PEOPLE: SOMEWHAT DIFFICULT
6. BECOMING EASILY ANNOYED OR IRRITABLE: SEVERAL DAYS

## 2023-08-17 NOTE — LETTER
2023      RE: Lorena Fritz  [address] [facility] [address]     Dear Colleague,    Thank you for the opportunity to participate in the care of your patient, Lorena Fritz, at the [facility] at [facility]. Please see a copy of my visit note below.     Rheumatologic History:    Primary rheumatologic diagnosis:systemic lupus erythematosus  Date of symptom onset:2015  Date of first visit to center:16  Date of diagnosis:16       Presenting clinical features included:  Abdominal pain, diarrhea, weight loss, lymphadenopathy elevated ESR    Lab evaluation at onset notable for:   dsDNA>1000, KAIA positive, SSA 71.1 (normal <20), SSB 5.5, Sm 12, U1-RNP/Sm 86.4, Scl-70 2.3, Amber-1 3.6, TSH normal, repeat CBC on 16 was normal, cryptosporidium/giardia negative, ova and parasites negative, Q fever negative, HIV negative, blood culture no growth, cryptococcus negative, CK 27, C3 54 (), C4 3.2, IgA 262.0, IgG 1760 (734-1570), IgM 57.4 (normal), haptoglobin normal, RF negative, beta-2-glycoprotein IgG/IgM negative, cardiolipin IgG 12 (normal up to 10), cardiolipin IgM <4, direct Leydi positive, TICO anti-C3 negative, DRVVT ration normal     Notable imaging or pathology:   GI who performed upper and lower endoscopies with biopsies. Biopsies showed only mild inflammation in the duodenum and colon. She also had an MR enterography on 16 which did not show bowel disease but did show mildly prominent adenopathy in several areas.     Treatment history:   IVMP 30 mg/kg once during hospitalization, then 30 mg prednisone BID, tapered over several months; mycophenolate, hydroxychloroquine      Health Maintenance:    Last Eye Examination: unknown  Last Pulmonary Screening: PFT /DLCO:2016.  Chest imagin2016.   Last Cardiovascular Screening: ECHO:2022. EK2022. Lipid panel (should be every 3-5  "years):uknown.  Last Bone Health Screening: DEXA: not yet indicated none.  Last Neuropsych Testing:3/14/22.  Last TB screen:  8/2022    Last influenza vaccination:12/2022  Last COVID vaccination:3/2021   Last pneumococcal vaccination: (which one, PCV23)9/2016  Last shingles vaccination (if over age 19):none      Subjective:    Lorena is a 22 year old female who was seen in Pediatric Rheumatology clinic today for follow up of systemic lupus erythematosus (SLE). Lorena was last seen in our clinic on 5/11/2023 and returns today unaccompanied.      Since Lorena s last visit, has been taking hydroxychloroquine and mycophenolate. She has been taking 500 once daily. Overall has been doing well. Had a bad cold, COVID was negative. [place]. Overall feels really good. Excited to go back to college at [employer].     No major concerns.     Lorena has been receiving and tolerating medications well, without missed doses or notable side effects.     Healthcare Transitions:  Transitioning? yes  Target age to transition: 22  Adult provider selected? yes  Medical Summary created? no  Communication with adult provider? no    Transition topics covered today included: Adult provider planning    - Example prompts:   \"How do you talk to other people/other doctors about your lupus?\"   \"Do you think you could 'tell the story' of your lupus to another doctor if you needed to? How would you tell it? Think about writing this down in your phone.\"  \"Can you name all the medications you take, and why you take them?\"  \"Where would you go if you had a medical emergency? How would you get there?\"  \"What goals do you have for the upcoming year unrelated to your lupus or medical care?\"         Review of 14 systems is negative other than noted above.     Problem List:     Patient Active Problem List    Diagnosis Date Noted    Proteinuria 01/10/2018     Priority: High    Depression, unspecified depression type 03/04/2022     Priority: Medium    Other systemic " lupus erythematosus with other organ involvement (H) 07/31/2017     Priority: Medium    Anxiety 09/26/2016     Priority: Medium    Systemic lupus erythematosus (H) 09/10/2016     Priority: Medium     At diagnosis: Lorena is a 15 year old girl with history of chronic abdominal pain, diarrhea and weight loss,, lymphadenopathy, elevated inflammatory markers and recurrent fevers with new diagnosis of systemic lupus erythematosis (SLE) after [facility]. Criteria consistent with SLE include arthritis, Leydi positive anemia, positive KAIA, and positive dsDNA. She also has positive anti-Ro and anti-RNP antibody but is anti-Smith negative. Her C3 and C4 are both low, with C4 being undetectable. Given one dose of IVMP 30 mg/kg, then oral prednisone 30 mg BID. Eventually tapered off. Mycophenolate and hydroxychloroquine ongoing.     Arthritis (now improved), positive dsDNA, hypocomplementemia, Leydi positive (now negative).     No nephritis, ECHO and PFTs within normal limits    [facility])    Brain MRI essentially normal but suboptimal evaluation due to braces. Anti-neuronal antibody and ribosomal P antibodies negative.       Chronic abdominal pain 09/10/2016     Priority: Medium        Medications:     Current Outpatient Medications   Medication Sig Dispense Refill    AMOXICILLIN PO Take 1 tablet by mouth 1 tab daily for 10 days      Cobalamine Combinations (B12 FOLATE PO)  (Patient not taking: Reported on 8/18/2022)      coenzyme Q-10 75 MG CAPS Take 75 mg by mouth daily      diphenhydrAMINE (BENADRYL) 25 MG capsule Take by mouth as needed      escitalopram (LEXAPRO) 10 MG tablet Take 10 mg by mouth daily      Ferrous Sulfate (SLOW FE PO)  (Patient not taking: No sig reported)      folic acid (FOLVITE) 1 MG tablet Take 1 tablet (1 mg) by mouth daily 90 tablet 3    hydroxychloroquine (PLAQUENIL) 200 MG tablet  "Take 1 tablet (200 mg) by mouth daily For systemic lupus erythematosus diaganosis 90 tablet 1    hydrOXYzine (ATARAX) 25 MG tablet Take by mouth as needed      insulin syringe 31G X 5/16\" 1 ML MISC Use as directed to give methotrexate. 100 each 11    levonorgestrel (KYLEENA) 19.5 MG IUD Kyleena 17.5 mcg/24 hrs (5yrs) 19.5mg intrauterine device   Take 1 device by intrauterine route.      methotrexate 50 MG/2ML injection Inject 0.6 mLs (15 mg) Subcutaneous once a week 4 mL 3    mycophenolate (GENERIC EQUIVALENT) 250 MG capsule Take 1 capsule (250 mg) by mouth 2 times daily 60 capsule 5    nabumetone (RELAFEN) 500 MG tablet Take 2 tablets (1,000 mg) by mouth daily 180 tablet 2    ondansetron (ZOFRAN ODT) 4 MG ODT tab Take by mouth as needed      pantoprazole (PROTONIX) 40 MG EC tablet Take 1 tablet (40 mg) by mouth daily 90 tablet 2    prochlorperazine (COMPAZINE) 5 MG tablet Take 10 mg by mouth as needed      vitamin B-12 (CYANOCOBALAMIN) 1000 MCG tablet Take by mouth once a week      vitamin D3 (CHOLECALCIFEROL) 2000 units (50 mcg) tablet Take 2,000 Units by mouth daily          Allergies:     Allergies   Allergen Reactions    Cats Itching        Social History:     Social History     Social History Narrative    Lorena lives with her parents in [place]. She attends [employer] and participates in dance, golf, and \"Mock trial\". She is in the 10th grade and has a brother who is healthy.          Physical Examination:   Blood pressure 111/74, pulse 100, temperature 98.9  F (37.2  C), temperature source Oral, resp. rate 20, height 1.588 m (5' 2.52\"), weight 72.7 kg (160 lb 4.4 oz), SpO2 98 %.  Facility age limit for growth %misael is 20 years.  Growth %ile SmartLinks can only be used for patients less than 20 years old.  Body surface area is 1.79 meters squared.    Constitutional: awake, alert, cooperative, no apparent distress, and appears stated age.  Head: Normal head and hair pattern, no alopecia.  Eyes: " Lids and lashes normal, pupils equal, round and reactive to light, extra ocular muscles intact, sclera clear, conjunctiva normal.  Ears: External ears without lesions, ear canals normal. TM non-erythematous, not bulging bilaterally.  ENT: Sinuses nontender on palpation, oral pharynx with moist mucous membranes, tonsils without erythema or exudates, gums normal and good dentition, normal salivary pool.  Hematologic / Lymphatic: no cervical, supraclavicular, or axillary lymphadenopathy.  Respiratory: No increased work of breathing, good air exchange, clear to auscultation bilaterally without crackles or wheezing.  Cardiovascular: Regular rate and rhythm, normal S1 and S2, no S3 or S4, and no murmur noted.  GI: Normal bowel sounds, soft, non-distended, non-tender, no masses palpated, no hepatosplenomegaly.  Skin: no bruising or bleeding, normal skin color, texture, turgor, no rashes and no lesions.  Musculoskeletal: No evidence of current synovitis/arthritis of the cervical spine, TMJ, sternoclavicular, acromioclavicular, glenohumeral, elbow, wrists, finger, sacroiliac, hip, knee, ankle, or toe joints. No tendonitis or bursitis. No enthesitis.  No leg length discrepancy. Gait is normal with walking and running.  Neurologic: Awake, alert, oriented to name, place and time. Cranial nerves II-XII are grossly intact.  Motor is 5 out of 5 bilaterally. Sensory is intact. Tone is normal.  Neuropsychiatric: General: normal, calm and normal eye contact.    Assessment:   Lorena is a 22 year old female with systemic lupus erythematosus who presents today for follow-up. She had self-discontinued her medications for one year, but recently (approximately 6 weeks ago) restarted them when she was concerned about her dsDNA increasing. She is otherwise doing well. I had restarted her on a low dose of mycophenolate since she had been doing so well (250 mg BID), but she has been taking 500 mg once daily because she cannot remember to take it  at night We discussed that although this is not the ideal way to take the mycophenolate 500 mg once daily is better than 250 once daily, so I agreed this was ok. She also takes methotrexate and nabumetone for arthritis and this helps her.     Of note, her dsDNA has risen again to 89, from 65, but the rest of her labs are normal. I recommend we give her current treatment plan more time and reassess labs at her next visit.     Given chronic back pain, we obtained a lumbar x-ray and this was reassuring.      Plan:   Laboratory testing every 3 months, to monitor medications and disease activity.    No planned labs prior to next visit.  No imaging is needed today. Try methotrexate in the morning.   No new referrals made today.  Medications: As listed. Changes made today: none.  I recommend annual eye exams while on hydroxychlorquine (Plaquenil).  Return visit: Return in about 3 months (around 11/17/2023). Call sooner with any concerns.     If there are any new questions or concerns, I would be glad to help and can be reached through our main office at [phone] or our paging  at [phone].     30 minutes spent on the date of the encounter doing chart review, history and exam, documentation and further activities as noted above.   Petra Will MD  Pediatric Rheumatology             Addendum:  Laboratory Investigations:   Laboratory investigations performed today for which results were available at the time of this note are listed below. Pending labs will be reported in a separate letter.  Office Visit on 08/17/2023   Component Date Value    C3 Complement 08/17/2023 116     C4 Complement 08/17/2023 20     Creatinine 08/17/2023 0.61     GFR Estimate 08/17/2023 >90     CRP Inflammation 08/17/2023 <3.00     DNA (ds) Antibody 08/17/2023 89.0 (H)     Protein Total 08/17/2023 7.0     Albumin 08/17/2023 4.1     Bilirubin Total 08/17/2023 0.5     Alkaline Phosphatase 08/17/2023 60     AST 08/17/2023 29     ALT  08/17/2023 30     Bilirubin Direct 08/17/2023 <0.20     Color Urine 08/17/2023 Yellow     Appearance Urine 08/17/2023 Clear     Glucose Urine 08/17/2023 Negative     Bilirubin Urine 08/17/2023 Negative     Ketones Urine 08/17/2023 Negative     Specific Gravity Urine 08/17/2023 1.024     Blood Urine 08/17/2023 Negative     pH Urine 08/17/2023 7.5 (H)     Protein Albumin Urine 08/17/2023 20 (A)     Urobilinogen Urine 08/17/2023 Normal     Nitrite Urine 08/17/2023 Negative     Leukocyte Esterase Urine 08/17/2023 Negative     Bacteria Urine 08/17/2023 Few (A)     Mucus Urine 08/17/2023 Present (A)     RBC Urine 08/17/2023 <1     WBC Urine 08/17/2023 1     Squamous Epithelials Uri* 08/17/2023 2 (H)     Total Protein Urine mg/dL 08/17/2023 10.2     Total Protein UR MG/MG CR 08/17/2023 0.07     Creatinine Urine mg/dL 08/17/2023 141.2     WBC Count 08/17/2023 5.8     RBC Count 08/17/2023 4.36     Hemoglobin 08/17/2023 13.9     Hematocrit 08/17/2023 41.9     MCV 08/17/2023 96     MCH 08/17/2023 31.9     MCHC 08/17/2023 33.2     RDW 08/17/2023 12.6     Platelet Count 08/17/2023 252     % Neutrophils 08/17/2023 65     % Lymphocytes 08/17/2023 25     % Monocytes 08/17/2023 8     % Eosinophils 08/17/2023 1     % Basophils 08/17/2023 0     % Immature Granulocytes 08/17/2023 1     NRBCs per 100 WBC 08/17/2023 0     Absolute Neutrophils 08/17/2023 3.8     Absolute Lymphocytes 08/17/2023 1.5     Absolute Monocytes 08/17/2023 0.5     Absolute Eosinophils 08/17/2023 0.1     Absolute Basophils 08/17/2023 0.0     Absolute Immature Granul* 08/17/2023 0.0     Absolute NRBCs 08/17/2023 0.0             Recent Results (from the past 744 hour(s))   XR Lumbar Spine 2/3 Views    Narrative    EXAM: XR LUMBAR SPINE 2/3 VIEWS  LOCATION: [facility]  DATE: 8/17/2023    INDICATION: Back pain  COMPARISON: None available at time of dictation.      Impression    IMPRESSION: Straightening and subtle  levoconvex curvature of the lumbar spine without significant spondylolisthesis. No acute fracture. No significant degenerative change. Intrauterine device projecting over the pelvis.   XR Pelvis 1/2 Views    Narrative    EXAM: XR PELVIS 1/2 VIEWS  LOCATION: [facility]  DATE: 8/17/2023    INDICATION:  Systemic lupus erythematosus, unspecified SLE type, unspecified organ involvement status (H)  COMPARISON: None.      Impression    IMPRESSION: Both hips negative for fracture. Pelvis negative for fracture. Intrauterine device.              Please do not hesitate to contact me if you have any questions/concerns.     Sincerely,       Petra Will MD

## 2023-08-17 NOTE — NURSING NOTE
"Chief Complaint   Patient presents with    Arthritis     Lupus.     Vitals:    08/17/23 0919   BP: 111/74   BP Location: Right arm   Patient Position: Chair   Pulse: 100   Resp: 20   Temp: 98.9  F (37.2  C)   TempSrc: Oral   SpO2: 98%   Weight: 160 lb 4.4 oz (72.7 kg)   Height: 5' 2.52\" (1.588 m)           Zoie Angel M.A.    August 17, 2023    "

## 2023-08-17 NOTE — PATIENT INSTRUCTIONS
For Patient Education Materials:  [email or web address]       [facility] Physicians Pediatric Rheumatology    For Help:  The Pediatric Call Center at [phone] can help with scheduling of routine follow up visits.  Yana Huerta and Alcira Roe are the Nurse Coordinators for the Division of Pediatric Rheumatology and can be reached by phone at [phone] or through [employer] ([email or web address]). They can help with questions about your child s rheumatic condition, medications, and test results.  For emergencies after hours or on the weekends, please call the page  at [phone] and ask to speak to the physician on-call for Pediatric Rheumatology. Please do not use [employer] for urgent requests.  Main  Services:  [phone]  Hmong/Greenlandic/Guyanese: [phone]  South Korean: [phone]  Icelandic: [phone]    Internal Referrals: If we refer your child to another physician/team within [facility], you should receive a call to set this up. If you do not hear anything within a week, please call the Call Center at [phone].    External Referrals: If we refer your child to a physician/team outside of [facility], our team will send the referral order and relevant records to them. We ask that you call the place where your child is being referred to ensure they received the needed information and notify our team coordinators if not.    Imaging: If your child needs an imaging study that is not being performed the day of your clinic appointment, please call to set this up. For xrays, ultrasounds, and echocardiogram call [phone]. For CT or MRI call [phone].     MyChart: We encourage you to sign up for [email or web address]. For assistance or questions, call [phone]. If your child is 12 years or older, a consent for proxy/parent access needs to be signed so please discuss this with your physician at the next visit.

## 2023-08-17 NOTE — NURSING NOTE
Peds Outpatient BP  1) Rested for 5 minutes, BP taken on bare arm, patient sitting (or supine for infants) w/ legs uncrossed?   Yes  2) Right arm used?  Right arm   Yes  3) Arm circumference of largest part of upper arm (in cm): 32  4) BP cuff sized used: Adult (25-32cm)   If used different size cuff then what was recommended why? N/A  5) First BP reading:machine   BP Readings from Last 1 Encounters:   08/17/23 111/74      Is reading >90%?No   (90% for <1 years is 90/50)  (90% for >18 years is 140/90)  *If a machine BP is at or above 90% take manual BP  6) Manual BP reading: N/A  7) Other comments: None    Zoie Angel CMA.

## 2023-08-18 LAB
C3 SERPL-MCNC: 116 MG/DL (ref 81–157)
C4 SERPL-MCNC: 20 MG/DL (ref 13–39)
DSDNA AB SER-ACNC: 89 IU/ML

## 2023-10-04 ENCOUNTER — MYC MEDICAL ADVICE (OUTPATIENT)
Dept: RHEUMATOLOGY | Facility: CLINIC | Age: 23
End: 2023-10-04
Payer: COMMERCIAL

## 2023-10-06 DIAGNOSIS — M32.9 SYSTEMIC LUPUS ERYTHEMATOSUS, UNSPECIFIED SLE TYPE, UNSPECIFIED ORGAN INVOLVEMENT STATUS (H): Primary | ICD-10-CM

## 2023-10-07 ENCOUNTER — HEALTH MAINTENANCE LETTER (OUTPATIENT)
Age: 23
End: 2023-10-07

## 2023-10-09 NOTE — TELEPHONE ENCOUNTER
Lab orders faxed to [facility] per Dr. Will  [phone].  Patient aware that lab orders sent.    Henrietta Mckeon RN

## 2023-10-09 NOTE — PROGRESS NOTES
"Data/Assessment:  Popeye is a 22 y.o. female, in clinic alone today for routine SLE follow-up visit and lab work.  Her initial diagnosis was in 2016.  Popeye currently lives at college in [place] during the school year, and has been living in [place] at a camp this summer.  At other times, she lives at home with her mother, father, and 19 y.o. brother.  Pt is her own medical decision maker.  Pt reports that she feels \"great\" physically and that everything is currently going well.       Lorena will continue studies this coming Fall at [employer] for writing studies.  She set up a disabilities accomodations there to assist with her Lupus symptoms, and reports mixed response to getting assistance from professors.  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  Pt reports that she stays busy, as that helps distract from her anxiety.       Pt reports feeling safe in all relationships, as well as at home and at school.       Pt identifies her family and 2 close friends as her current support system. She reports that she is coping with her Lupus, but historically had a \"hard time\" with her symptoms (joint pain, fatigue, memory loss, etc.).  Pt reports that Lupus and her treatments \"sometimes\" keep her from doing what she wants to do in life.  Currently, she reports no symptoms or flares, which is a positive change from our last visit.  Lorena has noticed that the cold impacts her arthritis symptoms.       Lorena reports that she is confident in her ability to get her medications, but previously self-rated as 3:10 in taking the medications.  She reports not liking the side effects, so she \"only wants to take certain ones\".  Pt states that she takes her anti-depressants 100% of the time as well as one of her arthritis meds that she finds helpful.  Pt eventually admitted that she has stopped all of her Lupus meds for at least 1 year, as she doesn't like the taste and side effects. Pt was struggling some with her symptoms recently, and decided " "to restart her medications.  She finds it hard to remember the injectable methotrexate, but restarted the plaquenil and mycophenolate.  After previous medication adherence of 0%, pt now reports it as 90%.   Pt self-reports at a 10:10 for setting up and attending her Rheumatology appts.  In the past, pt found Lupus camps helpful, as she appreciated the opportunity to meet others with SLE.  Pt is able to advocate for self as needed.      Pt reports having difficulty falling asleep, but is then able to sleep the rest of the night.  She recently started utilizing a weighted blanket and believes that it helps.  She says that she \"doesn't have the same hunger cues\" since being dx with SLE, and has to remind self to eat as she experiences low appetite.  Lorena is active/exercises, but reports that the amount depends on her pain level, but when well, she enjoys gymnastics and indoor rock climbing. In the summer, exercise is built into her work duties and keeps her more active, so she finds this more challenging in the schoolyear.  Pt believes that her schedule is better currently, and this has a positive impact on coping with her disease.  Pt is \"hopeful about life, because there are fun things on the horizon\".  Pt denies SIB, hallucinations, delusions, and current suicidality.       Pt endorses \"often\" experiencing increased anxiety and depression due to Lupus and caring for her health.  Pt believes that her anxiety was \"Lupus-induced\", and self-rates anxiety as 5:10 with a baseline of 4:10.  MH Screening was completed today and pt self-rated as a \"9\" on the DHARA-7.  Previously, pt rated as a \"7\".   Pt has recently learned that loud noises and chaos are very triggering, and started wearing earplugs in overstimulating situations, which has been useful.  Affectively, pt appears to be doing well today.  Pt's anxiety symptoms may include: ruminating, overthinking, past social anxiety (better now), chest tightening, fidgeting, " "occasional panic attacks, hyperventilating, and crying.  Lorena has a specific aversion to IVs and shots, and states she she is \"triggered\" by medical shows and crowds.  Pt feels she is coping better, as she has not had any panic attacks this summer (experienced multiple last summer).       Pt endorses depression since the pandemic started.  Pt has previously experienced hopeless thoughts, feeling sad, crying, and lacking motivation to get out of bed at times as her constellation of depressive symptoms.  Lorena says this has improved recently.  MH screening was completed today, and the PHQ-9 scored as a \"10\" today (previously \"7\").       The positive news is that pt has been receiving services for her mental health for a few years, including seeing a therapist in the past and receiving medication therapy (Lexapro) through her PCP.  Pt has an upcoming appointment with an adult psychiatrist, which is a positive step.  Pt was seeing Rad Denny at [facility], and stopped upon moving to [place] for college.  Pt has no hx of MH hospitalizations.  Pt is comfortable reaching out to her PCP, therapist, or family if she ever has suicidal thoughts or notices that her MH is deteriorating for any reason. Lorena is interested in restarting therapy, as stated at the last visit.  SW reviewed the options that SW sent via [employer] after the last visit.       Transition to Adult Care   TRAQ: Done, scored high and demonstrated proficiency in most areas   Notes: Pt has been very independent in her disease management \"for years\".  She goes to appointments alone, engages in her own medical cares, and monitors her own adherence.     Education materials: Transition Care Letter, Age Recommendation Handout   Education provided: on insurance and how it functions     Plan/Recommendations:  -Continue to follow-up with adult psychiatrist for medication     -Pt requested a referral from Dr Will for a PCP in the [place] rather than the [facility], " since she isn't living here anymore.       -Pt would like to start therapy in [place] with a new therapist for continued maintenance of mental health.  A number of options were provided via [employer] at the last visit and continue to be available.         -Amenities: provided 2 food vouchers and a pillbox            PINEDA Gray/PATRICIA  Clinical /Lupus Mental Health Navigator   Pediatric Rheumatology      *no letter*

## 2023-11-01 NOTE — LETTER
2/2/2023      RE: Lorena Fritz  [address] [facility] [address]     Dear Colleague,    Thank you for the opportunity to participate in the care of your patient, Lorena Fritz, at the [facility] at [facility]. Please see a copy of my visit note below.           Medications:   As of completion of this visit:  Current Outpatient Medications   Medication Sig Dispense Refill     AMOXICILLIN PO Take 1 tablet by mouth 1 tab daily for 10 days       Cobalamine Combinations (B12 FOLATE PO)  (Patient not taking: Reported on 8/18/2022)       coenzyme Q-10 75 MG CAPS Take 75 mg by mouth daily       diphenhydrAMINE (BENADRYL) 25 MG capsule Take by mouth as needed       escitalopram (LEXAPRO) 10 MG tablet Take 10 mg by mouth daily       Ferrous Sulfate (SLOW FE PO)  (Patient not taking: No sig reported)       hydroxychloroquine (PLAQUENIL) 200 MG tablet Take 1 tablet (200 mg) by mouth daily For systemic lupus erythematosus diaganosis 90 tablet 1     hydrOXYzine (ATARAX) 25 MG tablet Take by mouth as needed       levonorgestrel (KYLEENA) 19.5 MG IUD Kyleena 17.5 mcg/24 hrs (5yrs) 19.5mg intrauterine device   Take 1 device by intrauterine route.       nabumetone (RELAFEN) 500 MG tablet Take 2 tablets (1,000 mg) by mouth daily 180 tablet 2     ondansetron (ZOFRAN ODT) 4 MG ODT tab Take by mouth as needed       pantoprazole (PROTONIX) 40 MG EC tablet Take 1 tablet (40 mg) by mouth daily 90 tablet 2     prochlorperazine (COMPAZINE) 5 MG tablet Take 10 mg by mouth as needed       vitamin B-12 (CYANOCOBALAMIN) 1000 MCG tablet Take by mouth once a week       vitamin D3 (CHOLECALCIFEROL) 2000 units (50 mcg) tablet Take 2,000 Units by mouth daily       Prescribed medications have been administered regularly, without missed doses, and the medications have been tolerated well, without side effects.         Allergies:     Allergies   Allergen  Atkins.   Reactions     Cats Itching           Problem list:     Patient Active Problem List    Diagnosis Date Noted     Proteinuria 01/10/2018     Priority: High     Depression, unspecified depression type 03/04/2022     Priority: Medium     Other systemic lupus erythematosus with other organ involvement (H) 07/31/2017     Priority: Medium     Anxiety 09/26/2016     Priority: Medium     Systemic lupus erythematosus (H) 09/10/2016     Priority: Medium     Arthritis (now improved), positive dsDNA, hypocomplementemia, Leydi positive (now negative).     No nephritis, ECHO and PFTs within normal limits    [facility])    Brain MRI essentially normal but suboptimal evaluation due to braces. Anti-neuronal antibody and ribosomal P antibodies negative.        Chronic abdominal pain 09/10/2016     Priority: Medium            Subjective:     Lorena is a 22 year old female who was seen in Pediatric Rheumatology clinic today for follow up of systemic lupus erythematosus (SLE). Lorena isun accompanied today.. At the last visit 9 months ago, she was doing well thus we made no changes to therapies. Since that time she has been doing well. She is loving [employer]. She is an English major.     She admitted today that she stopped taking all her lupus medications one year ago. She said they made her feel bad so she stopped them. She recently had labs that were normal. No other significant symptoms. She feels great. She has had no significant lupus symptoms.     She has had ongoing joint pain that she attributes to cold weather.     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     A 14-point review of systems was negative except as follows: fatigue, night sweats, change in sleep, lightheadedness with standing, nausea, constipation, rash, headache, anxiety, depression           Examination:   Blood pressure 122/72, pulse 92, temperature 98.9  F (37.2  C), temperature source Tympanic, resp. rate 20, height  "1.588 m (5' 2.52\"), weight 67.8 kg (149 lb 7.6 oz), SpO2 97 %.  Facility age limit for growth percentiles is 20 years.  Growth percentile SmartLinks can only be used for patients less than 20 years old.  Gen: Pleasant, well-appearing, NAD  HEENT/Neck: TM's clear bilaterally, oropharynx is clear without lesions, neck is supple with no lymphadenopathy                  CV: Regular rate and rhythm, normal S1, S2, no murmurs  Resp: Clear to ascultation bilaterally  Abd: Soft, non-tender, non-distended, no hepatosplenomegaly  Skin: Clear, there is no rash  MSK: All joints were examined including TMJ, sternoclavicular, acromioclavicular, neck, shoulder, elbow, wrist, hips, knees, ankles, fingers, and toes, and all were normal. She has generalized joint hypermobility, stable to previous exams.         Last Lab Results:     Lab on 01/05/2023   Component Date Value     C3 Complement 01/05/2023 108      C4 Complement 01/05/2023 19      Creatinine 01/05/2023 0.59      GFR Estimate 01/05/2023 >90      CRP Inflammation 01/05/2023 <2.9      DNA (ds) Antibody 01/05/2023 17.0 (H)      Bilirubin Total 01/05/2023 0.4      Bilirubin Direct 01/05/2023 <0.1      Protein Total 01/05/2023 7.5      Albumin 01/05/2023 3.6      Alkaline Phosphatase 01/05/2023 61      AST 01/05/2023 15      ALT 01/05/2023 26      Color Urine 01/05/2023 Yellow      Appearance Urine 01/05/2023 Clear      Glucose Urine 01/05/2023 Negative      Bilirubin Urine 01/05/2023 Negative      Ketones Urine 01/05/2023 Negative      Specific Gravity Urine 01/05/2023 1.022      Blood Urine 01/05/2023 Negative      pH Urine 01/05/2023 7.0      Protein Albumin Urine 01/05/2023 20 (A)      Urobilinogen Urine 01/05/2023 Normal      Nitrite Urine 01/05/2023 Negative      Leukocyte Esterase Urine 01/05/2023 Negative      Mucus Urine 01/05/2023 Present (A)      RBC Urine 01/05/2023 <1      WBC Urine 01/05/2023 1      Squamous Epithelials Uri* 01/05/2023 3 (H)      Total Protein " "Random Uri* 01/05/2023 0.12      Total Protein Urine g/gr* 01/05/2023 0.05      Creatinine Urine mg/dL 01/05/2023 234      WBC Count 01/05/2023 6.5      RBC Count 01/05/2023 4.56      Hemoglobin 01/05/2023 13.8      Hematocrit 01/05/2023 41.0      MCV 01/05/2023 90      MCH 01/05/2023 30.3      MCHC 01/05/2023 33.7      RDW 01/05/2023 12.0      Platelet Count 01/05/2023 269      % Neutrophils 01/05/2023 59      % Lymphocytes 01/05/2023 31      % Monocytes 01/05/2023 8      % Eosinophils 01/05/2023 1      % Basophils 01/05/2023 0      % Immature Granulocytes 01/05/2023 1      NRBCs per 100 WBC 01/05/2023 0      Absolute Neutrophils 01/05/2023 3.9      Absolute Lymphocytes 01/05/2023 2.0      Absolute Monocytes 01/05/2023 0.5      Absolute Eosinophils 01/05/2023 0.1      Absolute Basophils 01/05/2023 0.0      Absolute Immature Granul* 01/05/2023 0.1      Absolute NRBCs 01/05/2023 0.0                 Assessment:     Lorena is a 22 year old female with systemic lupus erythematosus. She had been treated with mycophenolate and hydroxychloroquine, however, today she reported to me that she stopped them one year ago. She says she actually feels better since stopping them since they made her \"feel bad\". Reassuringly, 3 weeks ago her labs were essentially normal including a very low (nearly normal) ds-DNA, normal C3 and C4 and normal CBC. She has not had lupus nephritis. She does have joint pain that she attributes to arthritis related to lupus, but it is unclear to me if this is at least partially due to her joint hypermobility.     We discussed that the concern with stopping medications when someone has lupus is that they can flare. However, she's been off all therapies for a year and has not flared and feels well. She may be a fortunately individual who has gone in remission and who may stay in remission for an extended period of time. We discussed the option of restarting the hydroxychloroquine since my suspicion is that " the medication that made her feel bad was mycophenolate. Also, hydroxychloroquine is only dosed once daily and is very safe. If her joint pain is from arthritis, it also helps with this. She was interested in restarting it. No need to restart mycophenolate at this time, but if she starts to show signs of flare we will restart it. She understands the importance of routine lab work even if she's feeling fine.          Plan:     1. Monitoring labs were obtained today.   2. Continue current therapies. Restart hydroxychloroquine.   3. I recommend annual eye exams while on hydroxychlorquine (Plaquenil).   4. Return in about 3 months (around 5/2/2023). Call sooner with any concerns.     If there are any new questions or concerns, I would be glad to help and can be reached through our main office at [phone] or our paging  at [phone].    21 minutes spent on the date of the encounter doing chart review, history and exam, documentation and further activities as noted above.     Petra Will MD  Pediatric Rheumatology  [facility]

## 2023-11-15 ENCOUNTER — DOCUMENTATION ONLY (OUTPATIENT)
Dept: RHEUMATOLOGY | Facility: CLINIC | Age: 23
End: 2023-11-15

## 2023-11-15 ENCOUNTER — OFFICE VISIT (OUTPATIENT)
Dept: RHEUMATOLOGY | Facility: CLINIC | Age: 23
End: 2023-11-15
Attending: INTERNAL MEDICINE
Payer: COMMERCIAL

## 2023-11-15 VITALS
HEART RATE: 89 BPM | SYSTOLIC BLOOD PRESSURE: 118 MMHG | OXYGEN SATURATION: 97 % | DIASTOLIC BLOOD PRESSURE: 77 MMHG | TEMPERATURE: 98.8 F | BODY MASS INDEX: 29.94 KG/M2 | WEIGHT: 162.7 LBS | HEIGHT: 62 IN

## 2023-11-15 DIAGNOSIS — M32.9 SYSTEMIC LUPUS ERYTHEMATOSUS, UNSPECIFIED SLE TYPE, UNSPECIFIED ORGAN INVOLVEMENT STATUS (H): Primary | ICD-10-CM

## 2023-11-15 LAB
ALBUMIN MFR UR ELPH: 6.9 MG/DL
ALBUMIN SERPL BCG-MCNC: 4.1 G/DL (ref 3.5–5.2)
ALBUMIN UR-MCNC: NEGATIVE MG/DL
ALP SERPL-CCNC: 61 U/L (ref 40–150)
ALT SERPL W P-5'-P-CCNC: 21 U/L (ref 0–50)
APPEARANCE UR: ABNORMAL
AST SERPL W P-5'-P-CCNC: 21 U/L (ref 0–45)
BACTERIA #/AREA URNS HPF: ABNORMAL /HPF
BASOPHILS # BLD AUTO: 0 10E3/UL (ref 0–0.2)
BASOPHILS NFR BLD AUTO: 0 %
BILIRUB DIRECT SERPL-MCNC: <0.2 MG/DL (ref 0–0.3)
BILIRUB SERPL-MCNC: 0.3 MG/DL
BILIRUB UR QL STRIP: NEGATIVE
COLOR UR AUTO: YELLOW
CREAT SERPL-MCNC: 0.57 MG/DL (ref 0.51–0.95)
CREAT UR-MCNC: 118.3 MG/DL
CRP SERPL-MCNC: <3 MG/L
EGFRCR SERPLBLD CKD-EPI 2021: >90 ML/MIN/1.73M2
EOSINOPHIL # BLD AUTO: 0 10E3/UL (ref 0–0.7)
EOSINOPHIL NFR BLD AUTO: 1 %
ERYTHROCYTE [DISTWIDTH] IN BLOOD BY AUTOMATED COUNT: 11.6 % (ref 10–15)
GLUCOSE UR STRIP-MCNC: NEGATIVE MG/DL
HCT VFR BLD AUTO: 38.9 % (ref 35–47)
HGB BLD-MCNC: 13.4 G/DL (ref 11.7–15.7)
HGB UR QL STRIP: NEGATIVE
IMM GRANULOCYTES # BLD: 0 10E3/UL
IMM GRANULOCYTES NFR BLD: 0 %
KETONES UR STRIP-MCNC: ABNORMAL MG/DL
LEUKOCYTE ESTERASE UR QL STRIP: NEGATIVE
LYMPHOCYTES # BLD AUTO: 2.3 10E3/UL (ref 0.8–5.3)
LYMPHOCYTES NFR BLD AUTO: 35 %
MCH RBC QN AUTO: 32 PG (ref 26.5–33)
MCHC RBC AUTO-ENTMCNC: 34.4 G/DL (ref 31.5–36.5)
MCV RBC AUTO: 93 FL (ref 78–100)
MONOCYTES # BLD AUTO: 0.4 10E3/UL (ref 0–1.3)
MONOCYTES NFR BLD AUTO: 6 %
MUCOUS THREADS #/AREA URNS LPF: PRESENT /LPF
NEUTROPHILS # BLD AUTO: 3.8 10E3/UL (ref 1.6–8.3)
NEUTROPHILS NFR BLD AUTO: 58 %
NITRATE UR QL: NEGATIVE
NRBC # BLD AUTO: 0 10E3/UL
NRBC BLD AUTO-RTO: 0 /100
PH UR STRIP: 7 [PH] (ref 5–7)
PLATELET # BLD AUTO: 272 10E3/UL (ref 150–450)
PROT SERPL-MCNC: 7 G/DL (ref 6.4–8.3)
PROT/CREAT 24H UR: 0.06 MG/MG CR (ref 0–0.2)
RBC # BLD AUTO: 4.19 10E6/UL (ref 3.8–5.2)
RBC URINE: 0 /HPF
SP GR UR STRIP: 1.02 (ref 1–1.03)
SQUAMOUS EPITHELIAL: 1 /HPF
UROBILINOGEN UR STRIP-MCNC: NORMAL MG/DL
WBC # BLD AUTO: 6.5 10E3/UL (ref 4–11)
WBC URINE: 0 /HPF

## 2023-11-15 PROCEDURE — 86140 C-REACTIVE PROTEIN: CPT | Performed by: INTERNAL MEDICINE

## 2023-11-15 PROCEDURE — 36415 COLL VENOUS BLD VENIPUNCTURE: CPT | Performed by: INTERNAL MEDICINE

## 2023-11-15 PROCEDURE — 99215 OFFICE O/P EST HI 40 MIN: CPT | Performed by: INTERNAL MEDICINE

## 2023-11-15 PROCEDURE — 85025 COMPLETE CBC W/AUTO DIFF WBC: CPT | Performed by: INTERNAL MEDICINE

## 2023-11-15 PROCEDURE — 82306 VITAMIN D 25 HYDROXY: CPT | Performed by: INTERNAL MEDICINE

## 2023-11-15 PROCEDURE — 82565 ASSAY OF CREATININE: CPT | Performed by: INTERNAL MEDICINE

## 2023-11-15 PROCEDURE — 86160 COMPLEMENT ANTIGEN: CPT | Performed by: INTERNAL MEDICINE

## 2023-11-15 PROCEDURE — 84156 ASSAY OF PROTEIN URINE: CPT | Performed by: INTERNAL MEDICINE

## 2023-11-15 PROCEDURE — 86225 DNA ANTIBODY NATIVE: CPT | Performed by: INTERNAL MEDICINE

## 2023-11-15 PROCEDURE — 81001 URINALYSIS AUTO W/SCOPE: CPT | Performed by: INTERNAL MEDICINE

## 2023-11-15 PROCEDURE — 99213 OFFICE O/P EST LOW 20 MIN: CPT | Performed by: INTERNAL MEDICINE

## 2023-11-15 PROCEDURE — 80076 HEPATIC FUNCTION PANEL: CPT | Performed by: INTERNAL MEDICINE

## 2023-11-15 RX ORDER — HYDROXYCHLOROQUINE SULFATE 200 MG/1
200 TABLET, FILM COATED ORAL DAILY
Qty: 90 TABLET | Refills: 1 | Status: SHIPPED | OUTPATIENT
Start: 2023-11-15 | End: 2023-12-13

## 2023-11-15 RX ORDER — METHOTREXATE 2.5 MG/1
15 TABLET ORAL
Qty: 24 TABLET | Refills: 5 | Status: SHIPPED | OUTPATIENT
Start: 2023-11-15 | End: 2023-11-16

## 2023-11-15 ASSESSMENT — ANXIETY QUESTIONNAIRES
5. BEING SO RESTLESS THAT IT IS HARD TO SIT STILL: SEVERAL DAYS
3. WORRYING TOO MUCH ABOUT DIFFERENT THINGS: SEVERAL DAYS
IF YOU CHECKED OFF ANY PROBLEMS ON THIS QUESTIONNAIRE, HOW DIFFICULT HAVE THESE PROBLEMS MADE IT FOR YOU TO DO YOUR WORK, TAKE CARE OF THINGS AT HOME, OR GET ALONG WITH OTHER PEOPLE: SOMEWHAT DIFFICULT
7. FEELING AFRAID AS IF SOMETHING AWFUL MIGHT HAPPEN: NOT AT ALL
GAD7 TOTAL SCORE: 8
GAD7 TOTAL SCORE: 8
1. FEELING NERVOUS, ANXIOUS, OR ON EDGE: SEVERAL DAYS
2. NOT BEING ABLE TO STOP OR CONTROL WORRYING: SEVERAL DAYS
6. BECOMING EASILY ANNOYED OR IRRITABLE: MORE THAN HALF THE DAYS

## 2023-11-15 ASSESSMENT — PAIN SCALES - GENERAL: PAINLEVEL: MILD PAIN (3)

## 2023-11-15 ASSESSMENT — PATIENT HEALTH QUESTIONNAIRE - PHQ9
5. POOR APPETITE OR OVEREATING: MORE THAN HALF THE DAYS
SUM OF ALL RESPONSES TO PHQ QUESTIONS 1-9: 12

## 2023-11-15 NOTE — NURSING NOTE
"Chief Complaint   Patient presents with    RECHECK     lupus       Vitals:    11/15/23 1552   BP: 118/77   BP Location: Right arm   Patient Position: Sitting   Cuff Size: Adult Regular   Pulse: 89   Temp: 98.8  F (37.1  C)   TempSrc: Tympanic   SpO2: 97%   Weight: 162 lb 11.2 oz (73.8 kg)   Height: 5' 2.24\" (158.1 cm)       Luz Branham, EMT  November 15, 2023    "

## 2023-11-15 NOTE — PROGRESS NOTES
"       Medications:   As of completion of this visit:  Current Outpatient Medications   Medication Sig Dispense Refill    AMOXICILLIN PO Take 1 tablet by mouth 1 tab daily for 10 days      Cobalamine Combinations (B12 FOLATE PO)       coenzyme Q-10 75 MG CAPS Take 75 mg by mouth daily      diphenhydrAMINE (BENADRYL) 25 MG capsule Take by mouth as needed      escitalopram (LEXAPRO) 10 MG tablet Take 10 mg by mouth daily      Ferrous Sulfate (SLOW FE PO)       folic acid (FOLVITE) 1 MG tablet Take 1 tablet (1 mg) by mouth daily 90 tablet 3    hydroxychloroquine (PLAQUENIL) 200 MG tablet Take 1 tablet (200 mg) by mouth daily For systemic lupus erythematosus diaganosis 90 tablet 1    hydrOXYzine (ATARAX) 25 MG tablet Take by mouth as needed      insulin syringe 31G X 5/16\" 1 ML MISC Use as directed to give methotrexate. 100 each 11    levonorgestrel (KYLEENA) 19.5 MG IUD Kyleena 17.5 mcg/24 hrs (5yrs) 19.5mg intrauterine device   Take 1 device by intrauterine route.      methotrexate 2.5 MG tablet Take 6 tablets (15 mg) by mouth every 7 days 24 tablet 5    mycophenolate (GENERIC EQUIVALENT) 250 MG capsule Take 1 capsule (250 mg) by mouth 2 times daily 60 capsule 5    nabumetone (RELAFEN) 500 MG tablet Take 2 tablets (1,000 mg) by mouth daily 180 tablet 2    ondansetron (ZOFRAN ODT) 4 MG ODT tab Take by mouth as needed      pantoprazole (PROTONIX) 40 MG EC tablet Take 1 tablet (40 mg) by mouth daily 90 tablet 2    prochlorperazine (COMPAZINE) 5 MG tablet Take 10 mg by mouth as needed      vitamin B-12 (CYANOCOBALAMIN) 1000 MCG tablet Take by mouth once a week      vitamin D3 (CHOLECALCIFEROL) 2000 units (50 mcg) tablet Take 2,000 Units by mouth daily         Prescribed medications have been administered regularly, without missed doses, and the medications have been tolerated well, without side effects.         Allergies:     Allergies   Allergen Reactions    Cats Itching           Problem list:     Patient Active Problem " List    Diagnosis Date Noted    Proteinuria 01/10/2018     Priority: High    Depression, unspecified depression type 03/04/2022     Priority: Medium    Other systemic lupus erythematosus with other organ involvement (H) 07/31/2017     Priority: Medium    Anxiety 09/26/2016     Priority: Medium    Systemic lupus erythematosus (H) 09/10/2016     Priority: Medium     At diagnosis: Lorena is a 15 year old girl with history of chronic abdominal pain, diarrhea and weight loss,, lymphadenopathy, elevated inflammatory markers and recurrent fevers with new diagnosis of systemic lupus erythematosis (SLE) after admission and work-up performed at [facility]. Criteria consistent with SLE include arthritis, Leydi positive anemia, positive KAIA, and positive dsDNA. She also has positive anti-Ro and anti-RNP antibody but is anti-Smith negative. Her C3 and C4 are both low, with C4 being undetectable. Given one dose of IVMP 30 mg/kg, then oral prednisone 30 mg BID. Eventually tapered off. Mycophenolate and hydroxychloroquine ongoing. SSA and SSB repeats were negative.     Arthritis (now improved), positive dsDNA, hypocomplementemia, Leydi positive (now negative).     No nephritis, ECHO and PFTs within normal limits    [facility])    Brain MRI essentially normal but suboptimal evaluation due to braces. Anti-neuronal antibody and ribosomal P antibodies negative.     Primary rheumatologic diagnosis:systemic lupus erythematosus  Date of symptom onset:8/2015  Date of first visit to center:9/9/16  Date of diagnosis:8/31/16       Presenting clinical features included:  Abdominal pain, diarrhea, weight loss, lymphadenopathy elevated ESR     Lab evaluation at onset notable for:   dsDNA>1000, KAIA positive, SSA 71.1 (normal <20), SSB 5.5, Sm 12, U1-RNP/Sm 86.4, Scl-70 2.3, Amber-1 3.6, TSH normal, repeat CBC on 9/1/16 was normal, cryptosporidium/giardia negative, ova  and parasites negative, Q fever negative, HIV negative, blood culture no growth, cryptococcus negative, CK 27, C3 54 (), C4 3.2, IgA 262.0, IgG 1760 (734-1570), IgM 57.4 (normal), haptoglobin normal, RF negative, beta-2-glycoprotein IgG/IgM negative, cardiolipin IgG 12 (normal up to 10), cardiolipin IgM <4, direct Leydi positive, TICO anti-C3 negative, DRVVT ration normal      Notable imaging or pathology:   GI who performed upper and lower endoscopies with biopsies. Biopsies showed only mild inflammation in the duodenum and colon. She also had an MR enterography on 8/16/16 which did not show bowel disease but did show mildly prominent adenopathy in several areas.      Treatment history:   IVMP 30 mg/kg once during hospitalization, then 30 mg prednisone BID, tapered over several months; mycophenolate, hydroxychloroquine      Chronic abdominal pain 09/10/2016     Priority: Medium          Subjective:     Lorena is a 23 year old female who was seen in Pediatric Rheumatology clinic today for follow up of systemic lupus erythematosus (SLE). Lorena is unaccompanied today. At the last visit 3 months ago, she was doing fairly well thus we made no changes to therapies. She contacted us one month ago with a worsening fatigue. We ordered labs but I did not see the results.     Today, she reports that she's still fatigued. Had too many issues getting her labs done at school so never got them. Her eczema is also flaring and she's worried that she might be flaring her lupus since they often occur together. Otherwise no other new symptoms.     Continues to forget her methotrexate injection so wants to switch to pills so she remembers more easily as she will take it with her other pills. Still taking mycophenolate    Her memory has been really bad. Has accommodations at school. When I ask about other neurologic issues, she says she has bad headaches.     [facility]. Has a therapist. Philippe Modi. Psychiatrist is  "Coral Sorensen. This is going really. Working on insomnia though Lorena didn't know she had insomnia. Takes an hour to fall asleep at night. This has been going on for years.     Taking classes and working 3 part-time jobs. Fatigued prior to this. Also, fatigue is not affected by amount of work she does. She's enjoying school and working.     A 14-point review of systems was negative except as follows: fatigue, night sweats, lightheadedness with standing, nausea, rashes, headaches, anxiety, depressed, muscle pain.            Examination:   Blood pressure 118/77, pulse 89, temperature 98.8  F (37.1  C), temperature source Tympanic, height 1.581 m (5' 2.24\"), weight 73.8 kg (162 lb 11.2 oz), SpO2 97%.  Facility age limit for growth %misael is 20 years.  Growth %ile SmartLinks can only be used for patients less than 20 years old.  Gen: Pleasant, well-appearing, NAD  HEENT/Neck: TM's clear bilaterally, oropharynx is clear without lesions, neck is supple with no lymphadenopathy                  CV: Regular rate and rhythm, normal S1, S2, no murmurs  Resp: Clear to ascultation bilaterally  Abd: Soft, non-tender, non-distended, no hepatosplenomegaly  Skin: Clear, there is no rash  MSK: All joints were examined including TMJ, sternoclavicular, acromioclavicular, neck, shoulder, elbow, wrist, hips, knees, ankles, fingers, and toes, and all were normal          Last Lab Results:            Assessment:     Lorena is a 23 year old female with systemic lupus erythematosus. She is treated with mycophenolate, hydroxychloroquine and methotrexate. She's like to switch methotrexate to pills so it's easier to remember and I did change this. She takes methotrexate for joint pain. She appears well, but is feeling  more fatigued than usual so is worried her lupus might be flaring. If her labs today are normal, she will feel reassured enough to deal with the fatigue, which is a chronic issue. She's not interested in seeing integrative health " again.     She is plugged in with psychiatry and psychology. They will work on her insomnia and I do wonder if this will help with fatigue. She hadn't realized she had insomnia.     For memory issues and headaches we discussed that she could potentially see neurology though I'm not certain what they will recommend. She was interested in this.          Plan:     Monitoring labs were obtained today. Labs are pending and I will update her with results.   Continue current therapies. I switched methotrexate to oral.   Neurology at - referral placed.   I recommend annual eye exams while on hydroxychlorquine (Plaquenil). She is overdue.   Return in about 3 months (around 2/15/2024). Call sooner with any concerns.     If there are any new questions or concerns, I would be glad to help and can be reached through our main office at [phone] or our paging  at [phone].    40 minutes spent on the date of the encounter doing chart review, history and exam, documentation and further activities as noted above.     Petra Will MD  Pediatric Rheumatology  [facility]

## 2023-11-15 NOTE — LETTER
"11/15/2023      RE: Lorena Fritz  [address] [facility] [address]     Dear Colleague,    Thank you for the opportunity to participate in the care of your patient, Lorena Fritz, at the [facility] at [facility]. Please see a copy of my visit note below.           Medications:   As of completion of this visit:  Current Outpatient Medications   Medication Sig Dispense Refill    AMOXICILLIN PO Take 1 tablet by mouth 1 tab daily for 10 days      Cobalamine Combinations (B12 FOLATE PO)       coenzyme Q-10 75 MG CAPS Take 75 mg by mouth daily      diphenhydrAMINE (BENADRYL) 25 MG capsule Take by mouth as needed      escitalopram (LEXAPRO) 10 MG tablet Take 10 mg by mouth daily      Ferrous Sulfate (SLOW FE PO)       folic acid (FOLVITE) 1 MG tablet Take 1 tablet (1 mg) by mouth daily 90 tablet 3    hydroxychloroquine (PLAQUENIL) 200 MG tablet Take 1 tablet (200 mg) by mouth daily For systemic lupus erythematosus diaganosis 90 tablet 1    hydrOXYzine (ATARAX) 25 MG tablet Take by mouth as needed      insulin syringe 31G X 5/16\" 1 ML MISC Use as directed to give methotrexate. 100 each 11    levonorgestrel (KYLEENA) 19.5 MG IUD Kyleena 17.5 mcg/24 hrs (5yrs) 19.5mg intrauterine device   Take 1 device by intrauterine route.      methotrexate 2.5 MG tablet Take 6 tablets (15 mg) by mouth every 7 days 24 tablet 5    mycophenolate (GENERIC EQUIVALENT) 250 MG capsule Take 1 capsule (250 mg) by mouth 2 times daily 60 capsule 5    nabumetone (RELAFEN) 500 MG tablet Take 2 tablets (1,000 mg) by mouth daily 180 tablet 2    ondansetron (ZOFRAN ODT) 4 MG ODT tab Take by mouth as needed      pantoprazole (PROTONIX) 40 MG EC tablet Take 1 tablet (40 mg) by mouth daily 90 tablet 2    prochlorperazine (COMPAZINE) 5 MG tablet Take 10 mg by mouth as needed      vitamin B-12 (CYANOCOBALAMIN) 1000 MCG tablet Take by mouth once a week      vitamin " D3 (CHOLECALCIFEROL) 2000 units (50 mcg) tablet Take 2,000 Units by mouth daily         Prescribed medications have been administered regularly, without missed doses, and the medications have been tolerated well, without side effects.         Allergies:     Allergies   Allergen Reactions    Cats Itching           Problem list:     Patient Active Problem List    Diagnosis Date Noted    Proteinuria 01/10/2018     Priority: High    Depression, unspecified depression type 03/04/2022     Priority: Medium    Other systemic lupus erythematosus with other organ involvement (H) 07/31/2017     Priority: Medium    Anxiety 09/26/2016     Priority: Medium    Systemic lupus erythematosus (H) 09/10/2016     Priority: Medium     At diagnosis: Lorena is a 15 year old girl with history of chronic abdominal pain, diarrhea and weight loss,, lymphadenopathy, elevated inflammatory markers and recurrent fevers with new diagnosis of systemic lupus erythematosis (SLE) after admission and work-up performed at [facility]. Criteria consistent with SLE include arthritis, Leydi positive anemia, positive KAIA, and positive dsDNA. She also has positive anti-Ro and anti-RNP antibody but is anti-Smith negative. Her C3 and C4 are both low, with C4 being undetectable. Given one dose of IVMP 30 mg/kg, then oral prednisone 30 mg BID. Eventually tapered off. Mycophenolate and hydroxychloroquine ongoing. SSA and SSB repeats were negative.     Arthritis (now improved), positive dsDNA, hypocomplementemia, Leydi positive (now negative).     No nephritis, ECHO and PFTs within normal limits    [facility])    Brain MRI essentially normal but suboptimal evaluation due to braces. Anti-neuronal antibody and ribosomal P antibodies negative.     Primary rheumatologic diagnosis:systemic lupus erythematosus  Date of symptom onset:8/2015  Date of first visit to center:9/9/16  Date of  diagnosis:8/31/16       Presenting clinical features included:  Abdominal pain, diarrhea, weight loss, lymphadenopathy elevated ESR     Lab evaluation at onset notable for:   dsDNA>1000, KAIA positive, SSA 71.1 (normal <20), SSB 5.5, Sm 12, U1-RNP/Sm 86.4, Scl-70 2.3, Amber-1 3.6, TSH normal, repeat CBC on 9/1/16 was normal, cryptosporidium/giardia negative, ova and parasites negative, Q fever negative, HIV negative, blood culture no growth, cryptococcus negative, CK 27, C3 54 (), C4 3.2, IgA 262.0, IgG 1760 (734-1570), IgM 57.4 (normal), haptoglobin normal, RF negative, beta-2-glycoprotein IgG/IgM negative, cardiolipin IgG 12 (normal up to 10), cardiolipin IgM <4, direct Leydi positive, TIOC anti-C3 negative, DRVVT ration normal      Notable imaging or pathology:   GI who performed upper and lower endoscopies with biopsies. Biopsies showed only mild inflammation in the duodenum and colon. She also had an MR enterography on 8/16/16 which did not show bowel disease but did show mildly prominent adenopathy in several areas.      Treatment history:   IVMP 30 mg/kg once during hospitalization, then 30 mg prednisone BID, tapered over several months; mycophenolate, hydroxychloroquine      Chronic abdominal pain 09/10/2016     Priority: Medium          Subjective:     Lorena is a 23 year old female who was seen in Pediatric Rheumatology clinic today for follow up of systemic lupus erythematosus (SLE). Lorena is unaccompanied today. At the last visit 3 months ago, she was doing fairly well thus we made no changes to therapies. She contacted us one month ago with a worsening fatigue. We ordered labs but I did not see the results.     Today, she reports that she's still fatigued. Had too many issues getting her labs done at school so never got them. Her eczema is also flaring and she's worried that she might be flaring her lupus since they often occur together. Otherwise no other new symptoms.     Continues to forget her  "methotrexate injection so wants to switch to pills so she remembers more easily as she will take it with her other pills. Still taking mycophenolate    Her memory has been really bad. Has accommodations at school. When I ask about other neurologic issues, she says she has bad headaches.     [facility]. Has a therapist. Philippe Modi. Psychiatrist is Coral Sorensen. This is going really. Working on insomnia though Lorena didn't know she had insomnia. Takes an hour to fall asleep at night. This has been going on for years.     Taking classes and working 3 part-time jobs. Fatigued prior to this. Also, fatigue is not affected by amount of work she does. She's enjoying school and working.     A 14-point review of systems was negative except as follows: fatigue, night sweats, lightheadedness with standing, nausea, rashes, headaches, anxiety, depressed, muscle pain.            Examination:   Blood pressure 118/77, pulse 89, temperature 98.8  F (37.1  C), temperature source Tympanic, height 1.581 m (5' 2.24\"), weight 73.8 kg (162 lb 11.2 oz), SpO2 97%.  Facility age limit for growth %misael is 20 years.  Growth %ile SmartLinks can only be used for patients less than 20 years old.  Gen: Pleasant, well-appearing, NAD  HEENT/Neck: TM's clear bilaterally, oropharynx is clear without lesions, neck is supple with no lymphadenopathy                  CV: Regular rate and rhythm, normal S1, S2, no murmurs  Resp: Clear to ascultation bilaterally  Abd: Soft, non-tender, non-distended, no hepatosplenomegaly  Skin: Clear, there is no rash  MSK: All joints were examined including TMJ, sternoclavicular, acromioclavicular, neck, shoulder, elbow, wrist, hips, knees, ankles, fingers, and toes, and all were normal          Last Lab Results:            Assessment:     Lorena is a 23 year old female with systemic lupus erythematosus. She is treated with mycophenolate, hydroxychloroquine and methotrexate. She's like to switch methotrexate " to pills so it's easier to remember and I did change this. She takes methotrexate for joint pain. She appears well, but is feeling  more fatigued than usual so is worried her lupus might be flaring. If her labs today are normal, she will feel reassured enough to deal with the fatigue, which is a chronic issue. She's not interested in seeing integrative health again.     She is plugged in with psychiatry and psychology. They will work on her insomnia and I do wonder if this will help with fatigue. She hadn't realized she had insomnia.     For memory issues and headaches we discussed that she could potentially see neurology though I'm not certain what they will recommend. She was interested in this.          Plan:     Monitoring labs were obtained today. Labs are pending and I will update her with results.   Continue current therapies. I switched methotrexate to oral.   [facility]rology at [facility] placed.   I recommend annual eye exams while on hydroxychlorquine (Plaquenil). She is overdue.   Return in about 3 months (around 2/15/2024). Call sooner with any concerns.     If there are any new questions or concerns, I would be glad to help and can be reached through our main office at [phone] or our paging  at [phone].    40 minutes spent on the date of the encounter doing chart review, history and exam, documentation and further activities as noted above.     Petra Will MD  Pediatric Rheumatology  [facility]

## 2023-11-15 NOTE — PATIENT INSTRUCTIONS
For Patient Education Materials:  z.[facility]/jacky       [facility] Pediatric Rheumatology    For Help:  The Pediatric Call Center at [phone] can help with scheduling of routine follow up visits.  Yana Huerta and Alcira Roe are the Nurse Coordinators for the Division of Pediatric Rheumatology and can be reached by phone at [phone] or through [employer] [email or web address]). They can help with questions about your child s rheumatic condition, medications, and test results.  For emergencies after hours or on the weekends, please call the page  at [phone] and ask to speak to the physician on-call for Pediatric Rheumatology. Please do not use [employer] for urgent requests.  Main  Services:  [phone]  Hmong/Mosotho/Swazi: [phone]  Paraguayan: [phone]  Saudi Arabian: [phone]    Internal Referrals: If we refer your child to another physician/team within [facility], you should receive a call to set this up. If you do not hear anything within a week, please call the Call Center at [phone].    External Referrals: If we refer your child to a physician/team outside of [facility], our team will send the referral order and relevant records to them. We ask that you call the place where your child is being referred to ensure they received the needed information and notify our team coordinators if not.    Imaging: If your child needs an imaging study that is not being performed the day of your clinic appointment, please call to set this up. For xrays, ultrasounds, and echocardiogram call [phone]. For CT or MRI call [phone].     MyChart: We encourage you to sign up for [email or web address]. For assistance or questions, call [phone]. If your child is 12 years or older, a consent for proxy/parent access needs to be signed so please discuss this with your physician at the next visit.

## 2023-11-16 DIAGNOSIS — M32.9 SYSTEMIC LUPUS ERYTHEMATOSUS, UNSPECIFIED SLE TYPE, UNSPECIFIED ORGAN INVOLVEMENT STATUS (H): ICD-10-CM

## 2023-11-16 LAB
C3 SERPL-MCNC: 112 MG/DL (ref 81–157)
C4 SERPL-MCNC: 18 MG/DL (ref 13–39)
DSDNA AB SER-ACNC: 56 IU/ML

## 2023-11-16 RX ORDER — METHOTREXATE 2.5 MG/1
15 TABLET ORAL
Qty: 77 TABLET | Refills: 0 | Status: SHIPPED | OUTPATIENT
Start: 2023-11-16 | End: 2023-12-13

## 2023-11-28 ENCOUNTER — TELEPHONE (OUTPATIENT)
Dept: RHEUMATOLOGY | Facility: CLINIC | Age: 23
End: 2023-11-28
Payer: COMMERCIAL

## 2023-11-28 NOTE — TELEPHONE ENCOUNTER
"Lorena called. She said in class recently her vision started \"going out\", and she was unable to see out of one eye for about 30 minutes. She felt nauseous and \"really bad\". No other symptoms to note. She would like to know if Dr. Will has any thoughts about this, and if she would like Lorena to see an eye doctor, if she has any in [place] she would recommend.   "

## 2023-12-13 DIAGNOSIS — M32.9 SYSTEMIC LUPUS ERYTHEMATOSUS, UNSPECIFIED SLE TYPE, UNSPECIFIED ORGAN INVOLVEMENT STATUS (H): Primary | ICD-10-CM

## 2023-12-14 RX ORDER — METHOTREXATE 2.5 MG/1
15 TABLET ORAL
Qty: 77 TABLET | Refills: 0 | Status: SHIPPED | OUTPATIENT
Start: 2023-12-14 | End: 2024-02-22

## 2023-12-14 RX ORDER — FOLIC ACID 1 MG/1
1 TABLET ORAL DAILY
Qty: 90 TABLET | Refills: 3 | Status: SHIPPED | OUTPATIENT
Start: 2023-12-14 | End: 2024-02-22

## 2023-12-14 RX ORDER — MYCOPHENOLATE MOFETIL 250 MG/1
250 CAPSULE ORAL 2 TIMES DAILY
Qty: 60 CAPSULE | Refills: 5 | Status: SHIPPED | OUTPATIENT
Start: 2023-12-14 | End: 2024-02-22

## 2023-12-14 RX ORDER — HYDROXYCHLOROQUINE SULFATE 200 MG/1
200 TABLET, FILM COATED ORAL DAILY
Qty: 90 TABLET | Refills: 1 | Status: SHIPPED | OUTPATIENT
Start: 2023-12-14 | End: 2024-05-31

## 2023-12-14 RX ORDER — CALCIUM CARB/VITAMIN D3/VIT K1 500-100-40
TABLET,CHEWABLE ORAL
Qty: 100 EACH | Refills: 11 | Status: SHIPPED | OUTPATIENT
Start: 2023-12-14

## 2023-12-14 RX ORDER — NABUMETONE 500 MG/1
1000 TABLET, FILM COATED ORAL DAILY
Qty: 180 TABLET | Refills: 2 | Status: SHIPPED | OUTPATIENT
Start: 2023-12-14 | End: 2024-07-02

## 2023-12-18 NOTE — PROGRESS NOTES
"Data/Assessment:  Pt is a 23 y.o. female, in clinic alone today for routine SLE follow-up visit and lab work.  Her initial diagnosis was in 2016.  Pt currently lives at college in [place] during the school year, and has been living in [place] at a camp this summer.  At other times, she lives at home with her mother, father, and younger brother.  Pt is her own medical decision maker.  Pt reports that she feels \"great\" physically most of the time and that everything is currently going well.  Pt has been experiencing some excessive fatigue, but it is unclear if this is due to lupus or a very busy college schedule.  Pt reports that she is taking 5 classes and that she currently holds 3 jobs.       Lorena will continue studies this coming Fall at [employer] for writing studies, and is a [employer] this year.  She set up a disabilities accomodations there to assist with her Lupus symptoms, and reports mixed response to getting assistance from professors.  She works as a nanny and at an art museum, as well as writing for a newspaper.  Pt reports that she stays busy, as that helps distract from her anxiety.       Pt reports feeling safe in all relationships, as well as at home and at school.       Pt identifies her family and 2 close friends as her current support system. She reports that she is coping with her Lupus, but historically had a \"hard time\" with her symptoms (joint pain, fatigue, memory loss, etc.).  Pt reports that Lupus and her treatments \"sometimes\" keep her from doing what she wants to do in life.  Currently, she reports no symptoms or flares, which is a positive change from our last visit.  Lorena has noticed that the cold impacts her arthritis symptoms.       Lorena reports that she is confident in her ability to get her medications, but previously self-rated as 3:10 in taking the medications.  She reports not liking the side effects, so she \"only wants to take certain ones\".  Pt states that she takes her " "anti-depressants  (Lexapro) 100% of the time.  At the last appointment, pt admitted that she has stopped all of her Lupus meds for at least 1 year, as she doesn't like the taste and side effects. Pt was struggling some with her symptoms recently, and decided to restart her medications.  After previous medication adherence of 0% and then 90%, pt believes adherence has improved to 100%.   Pt self-reports at a 10:10 for setting up and attending her Rheumatology appts.  In the past, pt found Lupus camps helpful, as she appreciated the opportunity to meet others with SLE.  Pt is able to advocate for self as needed.      Pt continues to endorse difficulty falling asleep and newly reports disrupted sleep throughout the night.  Pt states that her sleep hygiene is \"awful\".  She recently started utilizing a weighted blanket and believes that it helps.  She says that she experiences better hunger cues than in the past, yet has to remind self to eat as she experiences low appetite.  Lorena is active/exercises, but reports that the amount depends on her pain level which is worse with the cold (joints reportedly hurt more), but when well, she enjoys gymnastics and indoor rock climbing. In the summer, exercise is built into her work duties and keeps her more active, so she finds this more challenging in the schoolyear.  Pt is \"hopeful about life, because there are fun things on the horizon\".  Pt denies SIB, hallucinations, delusions, and current suicidality.       Pt endorses \"often\" experiencing increased anxiety and depression due to Lupus and caring for her health.  Pt believes that her anxiety was \"Lupus-induced\", and self-rates anxiety as 5:10 with a baseline of 4:10.  MH Screening was completed today and pt self-rated as a \"8\" on the DHARA-7 (the two preceding scores were 9 and 7).  Pt has recently learned that loud noises and chaos are very triggering, and started wearing earplugs in overstimulating situations, which has been " "useful.  Affectively, pt appears to be doing well today.  Pt's anxiety symptoms may include: ruminating, overthinking, past social anxiety (better now), chest tightening, fidgeting, occasional panic attacks, hyperventilating, and crying.  Lorena has a specific aversion to IVs and shots, and states she she is \"triggered\" by medical shows and crowds.  Pt feels she is coping better, as she has not had any panic attacks recently.(experienced multiple last summer).       Pt endorses depression since the pandemic started.  Pt has previously experienced hopeless thoughts, feeling sad, crying, and lacking motivation to get out of bed at times as her constellation of depressive symptoms.  Lorena says this has improved recently.  The PHQ-9 was completed and pt self-rated as an \"8\" today (previous scores were \"10\" and \"7\").  Today pt describes depression as a \"non-issue\".       The positive news is that pt has been receiving services for her mental health for a few years, including seeing a therapist in the past.  Pt restarted therapy based on our recommendations in recent months, and Rylee P at [facility]. This is reportedly a good fit, and pt reports \"getting more out of it\" since she set it up herself.      Previously, pt received medication therapy (Lexapro) through her PCP, and we've been encouraging Lorena to start seeing a psychiatrist.  Pt began seeing an adult psychiatrist Naida, and states that it's going well.  If the Lexapro becomes less helpful, it may be useful for the pt to discuss switching to a more \"activating\" medication.  Pt has no hx of MH hospitalizations.  Pt is comfortable reaching out to her PCP, therapist, or family if she ever has suicidal thoughts or notices that her MH is deteriorating for any reason.      Transition to Adult Care              TRAQ: Not completed today.  Completed at last visit, scored high and demonstrated proficiency in most areas              Notes: Pt has been very independent in " "her disease management \"for years\".  She goes to appointments alone, engages in her own medical cares, and monitors her own adherence.                Education materials: Previous: Transition Care Letter, Age Recommendation Handout              Education provided: on insurance and how it functions     Plan/Recommendations:  -Continue to follow-up with adult psychiatrist for medication      -Continue therapy with Rylee at [facility]     -Amenities: provided 1 free parking voucher and a different type of pillbox (prone to losing parts to the other pillbox)           PINEDA Gray/PATRICIA  Clinical /Lupus Mental Health Navigator   Pediatric Rheumatology      *no letter*   "

## 2023-12-25 NOTE — NURSING NOTE
"Chief Complaint   Patient presents with     RECHECK     Patient is here today for Pain Management follow up     /79 (BP Location: Right arm, Patient Position: Fowlers, Cuff Size: Adult Regular)   Pulse 100   Temp 98.2  F (36.8  C) (Oral)   Resp 18   Ht 1.585 m (5' 2.4\")   Wt 58.4 kg (128 lb 12 oz)   SpO2 100%   BMI 23.25 kg/m      Urmila Cavazso LPN  June 13, 2019  " Normal for race

## 2024-02-21 ENCOUNTER — OFFICE VISIT (OUTPATIENT)
Dept: RHEUMATOLOGY | Facility: CLINIC | Age: 24
End: 2024-02-21
Attending: INTERNAL MEDICINE
Payer: COMMERCIAL

## 2024-02-21 ENCOUNTER — DOCUMENTATION ONLY (OUTPATIENT)
Dept: RHEUMATOLOGY | Facility: CLINIC | Age: 24
End: 2024-02-21

## 2024-02-21 VITALS
HEIGHT: 62 IN | OXYGEN SATURATION: 98 % | SYSTOLIC BLOOD PRESSURE: 118 MMHG | WEIGHT: 169.75 LBS | HEART RATE: 88 BPM | RESPIRATION RATE: 20 BRPM | BODY MASS INDEX: 31.24 KG/M2 | DIASTOLIC BLOOD PRESSURE: 77 MMHG | TEMPERATURE: 98.2 F

## 2024-02-21 DIAGNOSIS — M32.9 SYSTEMIC LUPUS ERYTHEMATOSUS, UNSPECIFIED SLE TYPE, UNSPECIFIED ORGAN INVOLVEMENT STATUS (H): Primary | ICD-10-CM

## 2024-02-21 LAB
ALBUMIN MFR UR ELPH: 7.8 MG/DL
ALBUMIN SERPL BCG-MCNC: 4.1 G/DL (ref 3.5–5.2)
ALBUMIN UR-MCNC: NEGATIVE MG/DL
ALP SERPL-CCNC: 56 U/L (ref 40–150)
ALT SERPL W P-5'-P-CCNC: 20 U/L (ref 0–50)
AMORPH CRY #/AREA URNS HPF: ABNORMAL /HPF
APPEARANCE UR: ABNORMAL
AST SERPL W P-5'-P-CCNC: 23 U/L (ref 0–45)
BACTERIA #/AREA URNS HPF: ABNORMAL /HPF
BASOPHILS # BLD AUTO: 0 10E3/UL (ref 0–0.2)
BASOPHILS NFR BLD AUTO: 0 %
BILIRUB DIRECT SERPL-MCNC: <0.2 MG/DL (ref 0–0.3)
BILIRUB SERPL-MCNC: 0.2 MG/DL
BILIRUB UR QL STRIP: NEGATIVE
COLOR UR AUTO: ABNORMAL
CREAT SERPL-MCNC: 0.59 MG/DL (ref 0.51–0.95)
CREAT UR-MCNC: 100 MG/DL
CRP SERPL-MCNC: <3 MG/L
EGFRCR SERPLBLD CKD-EPI 2021: >90 ML/MIN/1.73M2
EOSINOPHIL # BLD AUTO: 0.1 10E3/UL (ref 0–0.7)
EOSINOPHIL NFR BLD AUTO: 1 %
ERYTHROCYTE [DISTWIDTH] IN BLOOD BY AUTOMATED COUNT: 12.3 % (ref 10–15)
GLUCOSE UR STRIP-MCNC: NEGATIVE MG/DL
HCT VFR BLD AUTO: 39.5 % (ref 35–47)
HGB BLD-MCNC: 13.2 G/DL (ref 11.7–15.7)
HGB UR QL STRIP: NEGATIVE
IMM GRANULOCYTES # BLD: 0 10E3/UL
IMM GRANULOCYTES NFR BLD: 0 %
KETONES UR STRIP-MCNC: NEGATIVE MG/DL
LEUKOCYTE ESTERASE UR QL STRIP: NEGATIVE
LYMPHOCYTES # BLD AUTO: 2.1 10E3/UL (ref 0.8–5.3)
LYMPHOCYTES NFR BLD AUTO: 35 %
MCH RBC QN AUTO: 30.7 PG (ref 26.5–33)
MCHC RBC AUTO-ENTMCNC: 33.4 G/DL (ref 31.5–36.5)
MCV RBC AUTO: 92 FL (ref 78–100)
MONOCYTES # BLD AUTO: 0.5 10E3/UL (ref 0–1.3)
MONOCYTES NFR BLD AUTO: 8 %
MUCOUS THREADS #/AREA URNS LPF: PRESENT /LPF
NEUTROPHILS # BLD AUTO: 3.4 10E3/UL (ref 1.6–8.3)
NEUTROPHILS NFR BLD AUTO: 56 %
NITRATE UR QL: NEGATIVE
NRBC # BLD AUTO: 0 10E3/UL
NRBC BLD AUTO-RTO: 0 /100
PH UR STRIP: 7.5 [PH] (ref 5–7)
PLATELET # BLD AUTO: 260 10E3/UL (ref 150–450)
PROT SERPL-MCNC: 7.2 G/DL (ref 6.4–8.3)
PROT/CREAT 24H UR: 0.08 MG/MG CR (ref 0–0.2)
RBC # BLD AUTO: 4.3 10E6/UL (ref 3.8–5.2)
RBC URINE: <1 /HPF
SP GR UR STRIP: 1.02 (ref 1–1.03)
SQUAMOUS EPITHELIAL: 1 /HPF
UROBILINOGEN UR STRIP-MCNC: NORMAL MG/DL
WBC # BLD AUTO: 6 10E3/UL (ref 4–11)
WBC URINE: <1 /HPF

## 2024-02-21 PROCEDURE — 86225 DNA ANTIBODY NATIVE: CPT | Performed by: INTERNAL MEDICINE

## 2024-02-21 PROCEDURE — 81001 URINALYSIS AUTO W/SCOPE: CPT | Performed by: INTERNAL MEDICINE

## 2024-02-21 PROCEDURE — 80076 HEPATIC FUNCTION PANEL: CPT | Performed by: INTERNAL MEDICINE

## 2024-02-21 PROCEDURE — 99214 OFFICE O/P EST MOD 30 MIN: CPT | Performed by: INTERNAL MEDICINE

## 2024-02-21 PROCEDURE — 86160 COMPLEMENT ANTIGEN: CPT | Performed by: INTERNAL MEDICINE

## 2024-02-21 PROCEDURE — 84156 ASSAY OF PROTEIN URINE: CPT | Performed by: INTERNAL MEDICINE

## 2024-02-21 PROCEDURE — 82565 ASSAY OF CREATININE: CPT | Performed by: INTERNAL MEDICINE

## 2024-02-21 PROCEDURE — 36415 COLL VENOUS BLD VENIPUNCTURE: CPT | Performed by: INTERNAL MEDICINE

## 2024-02-21 PROCEDURE — 85025 COMPLETE CBC W/AUTO DIFF WBC: CPT | Performed by: INTERNAL MEDICINE

## 2024-02-21 PROCEDURE — 86140 C-REACTIVE PROTEIN: CPT | Performed by: INTERNAL MEDICINE

## 2024-02-21 ASSESSMENT — ANXIETY QUESTIONNAIRES
5. BEING SO RESTLESS THAT IT IS HARD TO SIT STILL: MORE THAN HALF THE DAYS
2. NOT BEING ABLE TO STOP OR CONTROL WORRYING: SEVERAL DAYS
IF YOU CHECKED OFF ANY PROBLEMS ON THIS QUESTIONNAIRE, HOW DIFFICULT HAVE THESE PROBLEMS MADE IT FOR YOU TO DO YOUR WORK, TAKE CARE OF THINGS AT HOME, OR GET ALONG WITH OTHER PEOPLE: SOMEWHAT DIFFICULT
7. FEELING AFRAID AS IF SOMETHING AWFUL MIGHT HAPPEN: SEVERAL DAYS
GAD7 TOTAL SCORE: 11
3. WORRYING TOO MUCH ABOUT DIFFERENT THINGS: SEVERAL DAYS
1. FEELING NERVOUS, ANXIOUS, OR ON EDGE: SEVERAL DAYS
6. BECOMING EASILY ANNOYED OR IRRITABLE: NEARLY EVERY DAY
GAD7 TOTAL SCORE: 11

## 2024-02-21 ASSESSMENT — PAIN SCALES - GENERAL: PAINLEVEL: MODERATE PAIN (4)

## 2024-02-21 ASSESSMENT — PATIENT HEALTH QUESTIONNAIRE - PHQ9: 5. POOR APPETITE OR OVEREATING: MORE THAN HALF THE DAYS

## 2024-02-21 NOTE — PATIENT INSTRUCTIONS
For Patient Education Materials:  z.[facility]/jacky       [facility] Pediatric Rheumatology    For Help:  The Pediatric Call Center at [phone] can help with scheduling of routine follow up visits.  Yana Huerta and Alcira Roe are the Nurse Coordinators for the Division of Pediatric Rheumatology and can be reached by phone at [phone] or through [employer] ([email or web address]). They can help with questions about your child s rheumatic condition, medications, and test results.  For emergencies after hours or on the weekends, please call the page  at [phone] and ask to speak to the physician on-call for Pediatric Rheumatology. Please do not use [employer] for urgent requests.  Main  Services:  [phone]  Hmong/Mauritanian/Japanese: [phone]  Uruguayan: [phone]  Jamaican: [phone]    Internal Referrals: If we refer your child to another physician/team within [facility], you should receive a call to set this up. If you do not hear anything within a week, please call the Call Center at [phone].    External Referrals: If we refer your child to a physician/team outside of [facility], our team will send the referral order and relevant records to them. We ask that you call the place where your child is being referred to ensure they received the needed information and notify our team coordinators if not.    Imaging: If your child needs an imaging study that is not being performed the day of your clinic appointment, please call to set this up. For xrays, ultrasounds, and echocardiogram call [phone]. For CT or MRI call [phone].     MyChart: We encourage you to sign up for [employer] at [email or web address]. For assistance or questions, call [phone]. If your child is 12 years or older, a consent for proxy/parent access needs to be signed so please discuss this with your physician at the next visit.

## 2024-02-21 NOTE — NURSING NOTE
Peds Outpatient BP  1) Rested for 5 minutes, BP taken on bare arm, patient sitting (or supine for infants) w/ legs uncrossed?   Yes  2) Right arm used?  Right arm   Yes  3) Arm circumference of largest part of upper arm (in cm): 34  4) BP cuff sized used: Large Adult (32-43cm)   If used different size cuff then what was recommended why? N/A  5) First BP reading:machine   BP Readings from Last 1 Encounters:   02/21/24 118/77      Is reading >90%?No   (90% for <1 years is 90/50)  (90% for >18 years is 140/90)  *If a machine BP is at or above 90% take manual BP  6) Manual BP reading: N/A  7) Other comments: None    Zoie Angel CMA.

## 2024-02-21 NOTE — NURSING NOTE
"Chief Complaint   Patient presents with    Arthritis     Lupus.     Vitals:    02/21/24 1355   BP: 118/77   BP Location: Right arm   Patient Position: Chair   Pulse: 88   Resp: 20   Temp: 98.2  F (36.8  C)   TempSrc: Oral   SpO2: 98%   Weight: 169 lb 12.1 oz (77 kg)   Height: 5' 2.32\" (158.3 cm)           Zoie Angel M.A.    February 21, 2024    "

## 2024-02-21 NOTE — PROGRESS NOTES
"       Medications:   As of completion of this visit:  Current Outpatient Medications   Medication Sig Dispense Refill    AMOXICILLIN PO Take 1 tablet by mouth 1 tab daily for 10 days      Cobalamine Combinations (B12 FOLATE PO)       coenzyme Q-10 75 MG CAPS Take 75 mg by mouth daily      diphenhydrAMINE (BENADRYL) 25 MG capsule Take by mouth as needed      escitalopram (LEXAPRO) 10 MG tablet Take 10 mg by mouth daily      Ferrous Sulfate (SLOW FE PO)       folic acid (FOLVITE) 1 MG tablet Take 1 tablet (1 mg) by mouth daily 90 tablet 3    hydroxychloroquine (PLAQUENIL) 200 MG tablet Take 1 tablet (200 mg) by mouth daily For systemic lupus erythematosus diaganosis 90 tablet 1    hydrOXYzine (ATARAX) 25 MG tablet Take by mouth as needed      insulin syringe 31G X 5/16\" 1 ML MISC Use as directed to give methotrexate. 100 each 11    levonorgestrel (KYLEENA) 19.5 MG IUD Kyleena 17.5 mcg/24 hrs (5yrs) 19.5mg intrauterine device   Take 1 device by intrauterine route.      methotrexate 2.5 MG tablet Take 6 tablets (15 mg) by mouth every 7 days 77 tablet 0    mycophenolate (GENERIC EQUIVALENT) 250 MG capsule Take 1 capsule (250 mg) by mouth 2 times daily 60 capsule 5    nabumetone (RELAFEN) 500 MG tablet Take 2 tablets (1,000 mg) by mouth daily 180 tablet 2    ondansetron (ZOFRAN ODT) 4 MG ODT tab Take by mouth as needed      pantoprazole (PROTONIX) 40 MG EC tablet Take 1 tablet (40 mg) by mouth daily 90 tablet 2    prochlorperazine (COMPAZINE) 5 MG tablet Take 10 mg by mouth as needed      vitamin B-12 (CYANOCOBALAMIN) 1000 MCG tablet Take by mouth once a week      vitamin D3 (CHOLECALCIFEROL) 2000 units (50 mcg) tablet Take 2,000 Units by mouth daily       Prescribed medications have been administered regularly, without missed doses, and the medications have been tolerated well, without side effects.         Allergies:     Allergies   Allergen Reactions    Cats Itching         Problem list:     Patient Active Problem " List    Diagnosis Date Noted    Proteinuria 01/10/2018     Priority: High    Depression, unspecified depression type 03/04/2022     Priority: Medium    Other systemic lupus erythematosus with other organ involvement (H) 07/31/2017     Priority: Medium    Anxiety 09/26/2016     Priority: Medium    Systemic lupus erythematosus (H) 09/10/2016     Priority: Medium     At diagnosis: Lorena is a 15 year old girl with history of chronic abdominal pain, diarrhea and weight loss,, lymphadenopathy, elevated inflammatory markers and recurrent fevers with new diagnosis of systemic lupus erythematosis (SLE) after admission and work-up performed at [facility]. Criteria consistent with SLE include arthritis, Leydi positive anemia, positive KAIA, and positive dsDNA. She also has positive anti-Ro and anti-RNP antibody but is anti-Smith negative. Her C3 and C4 are both low, with C4 being undetectable. Given one dose of IVMP 30 mg/kg, then oral prednisone 30 mg BID. Eventually tapered off. Mycophenolate and hydroxychloroquine ongoing. SSA and SSB repeats were negative.     Arthritis (now improved), positive dsDNA, hypocomplementemia, Leydi positive (now negative).     No nephritis, ECHO and PFTs within normal limits    [facility])    Brain MRI essentially normal but suboptimal evaluation due to braces. Anti-neuronal antibody and ribosomal P antibodies negative.     Primary rheumatologic diagnosis:systemic lupus erythematosus  Date of symptom onset:8/2015  Date of first visit to center:9/9/16  Date of diagnosis:8/31/16       Presenting clinical features included:  Abdominal pain, diarrhea, weight loss, lymphadenopathy elevated ESR     Lab evaluation at onset notable for:   dsDNA>1000, KAIA positive, SSA 71.1 (normal <20), SSB 5.5, Sm 12, U1-RNP/Sm 86.4, Scl-70 2.3, Amber-1 3.6, TSH normal, repeat CBC on 9/1/16 was normal, cryptosporidium/giardia negative, ova  "and parasites negative, Q fever negative, HIV negative, blood culture no growth, cryptococcus negative, CK 27, C3 54 (), C4 3.2, IgA 262.0, IgG 1760 (734-1570), IgM 57.4 (normal), haptoglobin normal, RF negative, beta-2-glycoprotein IgG/IgM negative, cardiolipin IgG 12 (normal up to 10), cardiolipin IgM <4, direct Leydi positive, TICO anti-C3 negative, DRVVT ration normal      Notable imaging or pathology:   GI who performed upper and lower endoscopies with biopsies. Biopsies showed only mild inflammation in the duodenum and colon. She also had an MR enterography on 8/16/16 which did not show bowel disease but did show mildly prominent adenopathy in several areas.      Treatment history:   IVMP 30 mg/kg once during hospitalization, then 30 mg prednisone BID, tapered over several months; mycophenolate, hydroxychloroquine      Chronic abdominal pain 09/10/2016     Priority: Medium          Subjective:     Lorena is a 23 year old female who was seen in Pediatric Rheumatology clinic today for follow up of systemic lupus erythematosus (SLE). Lorena is unaccompanied today. At the last visit 3 months ago, she was doing well thus we made no changes to therapies. I referred her to neurology for memory issues and headaches . Since that time she has been doing well, overall. Still tired. Her joints have been feeling well. She's in ballet class this semester. This class is challenging.     She has glasses, \"neuro lens\" that help with her dizziness.    She is not taking mycophenolate since they make her feel sick. She's also been off the methotrexate for a few months. She was unable to get it at the pharmacy at one point and then just stopped taking it. Her joints feel fine so she doesn't think she needs it. Her joints are worse in the cold weather and since it's been a warm winter she feels ok. Takes the nabumetone and hydroxychloroquine.      She didn't see anyone in neurology get. Having trouble getting in to [facility]. Will " "try to be seen in the [facility].     She is doing school, working 3 jobs (20 hours).     A 14-point review of systems was negative except as follows: fatigue, lightheadedness with standing, constipation, diarrhea, rash, headache, anxiety, depression           Examination:   Blood pressure 118/77, pulse 88, temperature 98.2  F (36.8  C), temperature source Oral, resp. rate 20, height 1.583 m (5' 2.32\"), weight 77 kg (169 lb 12.1 oz), SpO2 98%.  Facility age limit for growth %misael is 20 years.  Growth %ile SmartLinks can only be used for patients less than 20 years old.  Gen: Pleasant, well-appearing, NAD  HEENT/Neck: TM's clear bilaterally, oropharynx is clear without lesions, neck is supple with no lymphadenopathy                  CV: Regular rate and rhythm, normal S1, S2, no murmurs  Resp: Clear to ascultation bilaterally  Abd: Soft, non-tender, non-distended, no hepatosplenomegaly  Skin: Clear, there is no rash  MSK: All joints were examined including TMJ, sternoclavicular, acromioclavicular, neck, shoulder, elbow, wrist, hips, knees, ankles, fingers, and toes, and all were normal          Last Lab Results:     Office Visit on 02/21/2024   Component Date Value    Creatinine 02/21/2024 0.59     GFR Estimate 02/21/2024 >90     CRP Inflammation 02/21/2024 <3.00     Protein Total 02/21/2024 7.2     Albumin 02/21/2024 4.1     Bilirubin Total 02/21/2024 0.2     Alkaline Phosphatase 02/21/2024 56     AST 02/21/2024 23     ALT 02/21/2024 20     Bilirubin Direct 02/21/2024 <0.20     Color Urine 02/21/2024 Light Yellow     Appearance Urine 02/21/2024 Slightly Cloudy (A)     Glucose Urine 02/21/2024 Negative     Bilirubin Urine 02/21/2024 Negative     Ketones Urine 02/21/2024 Negative     Specific Gravity Urine 02/21/2024 1.021     Blood Urine 02/21/2024 Negative     pH Urine 02/21/2024 7.5 (H)     Protein Albumin Urine 02/21/2024 Negative     Urobilinogen Urine 02/21/2024 Normal     Nitrite Urine 02/21/2024 Negative     " Leukocyte Esterase Urine 02/21/2024 Negative     Bacteria Urine 02/21/2024 Few (A)     Mucus Urine 02/21/2024 Present (A)     Amorphous Crystals Urine 02/21/2024 Moderate (A)     RBC Urine 02/21/2024 <1     WBC Urine 02/21/2024 <1     Squamous Epithelials Uri* 02/21/2024 1     Total Protein Urine mg/dL 02/21/2024 7.8     Total Protein Urine mg/m* 02/21/2024 0.08     Creatinine Urine mg/dL 02/21/2024 100.0     WBC Count 02/21/2024 6.0     RBC Count 02/21/2024 4.30     Hemoglobin 02/21/2024 13.2     Hematocrit 02/21/2024 39.5     MCV 02/21/2024 92     MCH 02/21/2024 30.7     MCHC 02/21/2024 33.4     RDW 02/21/2024 12.3     Platelet Count 02/21/2024 260     % Neutrophils 02/21/2024 56     % Lymphocytes 02/21/2024 35     % Monocytes 02/21/2024 8     % Eosinophils 02/21/2024 1     % Basophils 02/21/2024 0     % Immature Granulocytes 02/21/2024 0     NRBCs per 100 WBC 02/21/2024 0     Absolute Neutrophils 02/21/2024 3.4     Absolute Lymphocytes 02/21/2024 2.1     Absolute Monocytes 02/21/2024 0.5     Absolute Eosinophils 02/21/2024 0.1     Absolute Basophils 02/21/2024 0.0     Absolute Immature Granul* 02/21/2024 0.0     Absolute NRBCs 02/21/2024 0.0      C3, C4, dsDNA pending         Assessment:     Lorena is a 23 year old female with systemic lupus erythematosus. She had been treated with mycophenolate, hydroxychloroquine and nabumetone and methotrexate. She self-discontinued mycophenolate because it makes her fatigue worse and she feels bad on it. She also stopped methotrexate because her joints have been ok lately. She feels well. She is adherent to hydroxychloroquine and nabumetone. Labs today look reassuring, but we are waiting on the C3, C4, and dsDNA. We discussed that if these are concerning, we can talk about an alternative to mycophenolate such as belimumab. If they are reassuring, she can stay on hydroxychloroquine monotherapy.      She is 23 years old and is quite independent with her healthcare. We discussed  that we should transition to adult care soon. She was in agreement with that. I recommend Dr. Ali Lerman. We discussed that the next visit may be the last, but I then heard from Dr. 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 Lerman once in the pediatric clinic first. We can make a decision at the next visit.          Plan:     Monitoring labs were obtained today.   Continue current therapies. I removed MMF and methotrexate from her med list.   I recommend annual eye exams while on hydroxychlorquine (Plaquenil).   Return in about 3 months (around 5/21/2024). Call sooner with any concerns.     If there are any new questions or concerns, I would be glad to help and can be reached through our main office at [phone] or our paging  at [phone].    31 minutes spent on the date of the encounter doing chart review, history and exam, documentation and further activities as noted above.     Petra Will MD  Pediatric Rheumatology  [facility]

## 2024-02-21 NOTE — LETTER
"2/21/2024      RE: Lorena Fritz  [address] [facility] [address]     Dear Colleague,    Thank you for the opportunity to participate in the care of your patient, Lorena Fritz, at the [facility] at [facility]. Please see a copy of my visit note below.           Medications:   As of completion of this visit:  Current Outpatient Medications   Medication Sig Dispense Refill    AMOXICILLIN PO Take 1 tablet by mouth 1 tab daily for 10 days      Cobalamine Combinations (B12 FOLATE PO)       coenzyme Q-10 75 MG CAPS Take 75 mg by mouth daily      diphenhydrAMINE (BENADRYL) 25 MG capsule Take by mouth as needed      escitalopram (LEXAPRO) 10 MG tablet Take 10 mg by mouth daily      Ferrous Sulfate (SLOW FE PO)       folic acid (FOLVITE) 1 MG tablet Take 1 tablet (1 mg) by mouth daily 90 tablet 3    hydroxychloroquine (PLAQUENIL) 200 MG tablet Take 1 tablet (200 mg) by mouth daily For systemic lupus erythematosus diaganosis 90 tablet 1    hydrOXYzine (ATARAX) 25 MG tablet Take by mouth as needed      insulin syringe 31G X 5/16\" 1 ML MISC Use as directed to give methotrexate. 100 each 11    levonorgestrel (KYLEENA) 19.5 MG IUD Kyleena 17.5 mcg/24 hrs (5yrs) 19.5mg intrauterine device   Take 1 device by intrauterine route.      methotrexate 2.5 MG tablet Take 6 tablets (15 mg) by mouth every 7 days 77 tablet 0    mycophenolate (GENERIC EQUIVALENT) 250 MG capsule Take 1 capsule (250 mg) by mouth 2 times daily 60 capsule 5    nabumetone (RELAFEN) 500 MG tablet Take 2 tablets (1,000 mg) by mouth daily 180 tablet 2    ondansetron (ZOFRAN ODT) 4 MG ODT tab Take by mouth as needed      pantoprazole (PROTONIX) 40 MG EC tablet Take 1 tablet (40 mg) by mouth daily 90 tablet 2    prochlorperazine (COMPAZINE) 5 MG tablet Take 10 mg by mouth as needed      vitamin B-12 (CYANOCOBALAMIN) 1000 MCG tablet Take by mouth once a week      vitamin D3 " (CHOLECALCIFEROL) 2000 units (50 mcg) tablet Take 2,000 Units by mouth daily       Prescribed medications have been administered regularly, without missed doses, and the medications have been tolerated well, without side effects.         Allergies:     Allergies   Allergen Reactions    Cats Itching         Problem list:     Patient Active Problem List    Diagnosis Date Noted    Proteinuria 01/10/2018     Priority: High    Depression, unspecified depression type 03/04/2022     Priority: Medium    Other systemic lupus erythematosus with other organ involvement (H) 07/31/2017     Priority: Medium    Anxiety 09/26/2016     Priority: Medium    Systemic lupus erythematosus (H) 09/10/2016     Priority: Medium     At diagnosis: Lorena is a 15 year old girl with history of chronic abdominal pain, diarrhea and weight loss,, lymphadenopathy, elevated inflammatory markers and recurrent fevers with new diagnosis of systemic lupus erythematosis (SLE) after admission and work-up performed at [facility]. Criteria consistent with SLE include arthritis, Leydi positive anemia, positive KAIA, and positive dsDNA. She also has positive anti-Ro and anti-RNP antibody but is anti-Smith negative. Her C3 and C4 are both low, with C4 being undetectable. Given one dose of IVMP 30 mg/kg, then oral prednisone 30 mg BID. Eventually tapered off. Mycophenolate and hydroxychloroquine ongoing. SSA and SSB repeats were negative.     Arthritis (now improved), positive dsDNA, hypocomplementemia, Leydi positive (now negative).     No nephritis, ECHO and PFTs within normal limits    [facility])    Brain MRI essentially normal but suboptimal evaluation due to braces. Anti-neuronal antibody and ribosomal P antibodies negative.     Primary rheumatologic diagnosis:systemic lupus erythematosus  Date of symptom onset:8/2015  Date of first visit to center:9/9/16  Date of  "diagnosis:8/31/16       Presenting clinical features included:  Abdominal pain, diarrhea, weight loss, lymphadenopathy elevated ESR     Lab evaluation at onset notable for:   dsDNA>1000, KAIA positive, SSA 71.1 (normal <20), SSB 5.5, Sm 12, U1-RNP/Sm 86.4, Scl-70 2.3, Amber-1 3.6, TSH normal, repeat CBC on 9/1/16 was normal, cryptosporidium/giardia negative, ova and parasites negative, Q fever negative, HIV negative, blood culture no growth, cryptococcus negative, CK 27, C3 54 (), C4 3.2, IgA 262.0, IgG 1760 (734-1570), IgM 57.4 (normal), haptoglobin normal, RF negative, beta-2-glycoprotein IgG/IgM negative, cardiolipin IgG 12 (normal up to 10), cardiolipin IgM <4, direct Leydi positive, TICO anti-C3 negative, DRVVT ration normal      Notable imaging or pathology:   GI who performed upper and lower endoscopies with biopsies. Biopsies showed only mild inflammation in the duodenum and colon. She also had an MR enterography on 8/16/16 which did not show bowel disease but did show mildly prominent adenopathy in several areas.      Treatment history:   IVMP 30 mg/kg once during hospitalization, then 30 mg prednisone BID, tapered over several months; mycophenolate, hydroxychloroquine      Chronic abdominal pain 09/10/2016     Priority: Medium          Subjective:     Lorena is a 23 year old female who was seen in Pediatric Rheumatology clinic today for follow up of systemic lupus erythematosus (SLE). Lorena is unaccompanied today. At the last visit 3 months ago, she was doing well thus we made no changes to therapies. I referred her to neurology for memory issues and headaches . Since that time she has been doing well, overall. Still tired. Her joints have been feeling well. She's in ballet class this semester. This class is challenging.     She has glasses, \"neuro lens\" that help with her dizziness.    She is not taking mycophenolate since they make her feel sick. She's also been off the methotrexate for a few months. She " "was unable to get it at the pharmacy at one point and then just stopped taking it. Her joints feel fine so she doesn't think she needs it. Her joints are worse in the cold weather and since it's been a warm winter she feels ok. Takes the nabumetone and hydroxychloroquine.      She didn't see anyone in neurology get. Having trouble getting in to [facility]. Will try to be seen in the [facility].     She is doing school, working 3 jobs (20 hours).     A 14-point review of systems was negative except as follows: fatigue, lightheadedness with standing, constipation, diarrhea, rash, headache, anxiety, depression           Examination:   Blood pressure 118/77, pulse 88, temperature 98.2  F (36.8  C), temperature source Oral, resp. rate 20, height 1.583 m (5' 2.32\"), weight 77 kg (169 lb 12.1 oz), SpO2 98%.  Facility age limit for growth %misael is 20 years.  Growth %ile SmartLinks can only be used for patients less than 20 years old.  Gen: Pleasant, well-appearing, NAD  HEENT/Neck: TM's clear bilaterally, oropharynx is clear without lesions, neck is supple with no lymphadenopathy                  CV: Regular rate and rhythm, normal S1, S2, no murmurs  Resp: Clear to ascultation bilaterally  Abd: Soft, non-tender, non-distended, no hepatosplenomegaly  Skin: Clear, there is no rash  MSK: All joints were examined including TMJ, sternoclavicular, acromioclavicular, neck, shoulder, elbow, wrist, hips, knees, ankles, fingers, and toes, and all were normal          Last Lab Results:     Office Visit on 02/21/2024   Component Date Value    Creatinine 02/21/2024 0.59     GFR Estimate 02/21/2024 >90     CRP Inflammation 02/21/2024 <3.00     Protein Total 02/21/2024 7.2     Albumin 02/21/2024 4.1     Bilirubin Total 02/21/2024 0.2     Alkaline Phosphatase 02/21/2024 56     AST 02/21/2024 23     ALT 02/21/2024 20     Bilirubin Direct 02/21/2024 <0.20     Color Urine 02/21/2024 Light Yellow     Appearance Urine 02/21/2024 Slightly Cloudy " (A)     Glucose Urine 02/21/2024 Negative     Bilirubin Urine 02/21/2024 Negative     Ketones Urine 02/21/2024 Negative     Specific Gravity Urine 02/21/2024 1.021     Blood Urine 02/21/2024 Negative     pH Urine 02/21/2024 7.5 (H)     Protein Albumin Urine 02/21/2024 Negative     Urobilinogen Urine 02/21/2024 Normal     Nitrite Urine 02/21/2024 Negative     Leukocyte Esterase Urine 02/21/2024 Negative     Bacteria Urine 02/21/2024 Few (A)     Mucus Urine 02/21/2024 Present (A)     Amorphous Crystals Urine 02/21/2024 Moderate (A)     RBC Urine 02/21/2024 <1     WBC Urine 02/21/2024 <1     Squamous Epithelials Uri* 02/21/2024 1     Total Protein Urine mg/dL 02/21/2024 7.8     Total Protein Urine mg/m* 02/21/2024 0.08     Creatinine Urine mg/dL 02/21/2024 100.0     WBC Count 02/21/2024 6.0     RBC Count 02/21/2024 4.30     Hemoglobin 02/21/2024 13.2     Hematocrit 02/21/2024 39.5     MCV 02/21/2024 92     MCH 02/21/2024 30.7     MCHC 02/21/2024 33.4     RDW 02/21/2024 12.3     Platelet Count 02/21/2024 260     % Neutrophils 02/21/2024 56     % Lymphocytes 02/21/2024 35     % Monocytes 02/21/2024 8     % Eosinophils 02/21/2024 1     % Basophils 02/21/2024 0     % Immature Granulocytes 02/21/2024 0     NRBCs per 100 WBC 02/21/2024 0     Absolute Neutrophils 02/21/2024 3.4     Absolute Lymphocytes 02/21/2024 2.1     Absolute Monocytes 02/21/2024 0.5     Absolute Eosinophils 02/21/2024 0.1     Absolute Basophils 02/21/2024 0.0     Absolute Immature Granul* 02/21/2024 0.0     Absolute NRBCs 02/21/2024 0.0      C3, C4, dsDNA pending         Assessment:     Lorena is a 23 year old female with systemic lupus erythematosus. She had been treated with mycophenolate, hydroxychloroquine and nabumetone and methotrexate. She self-discontinued mycophenolate because it makes her fatigue worse and she feels bad on it. She also stopped methotrexate because her joints have been ok lately. She feels well. She is adherent to  hydroxychloroquine and nabumetone. Labs today look reassuring, but we are waiting on the C3, C4, and dsDNA. We discussed that if these are concerning, we can talk about an alternative to mycophenolate such as belimumab. If they are reassuring, she can stay on hydroxychloroquine monotherapy.      She is 23 years old and is quite independent with her healthcare. We discussed that we should transition to adult care soon. She was in agreement with that. I recommend Dr. Ali Lerman. We discussed that the next visit may be the last, but I then heard from Dr. 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 Lerman once in the pediatric clinic first. We can make a decision at the next visit.          Plan:     Monitoring labs were obtained today.   Continue current therapies. I removed MMF and methotrexate from her med list.   I recommend annual eye exams while on hydroxychlorquine (Plaquenil).   Return in about 3 months (around 5/21/2024). Call sooner with any concerns.     If there are any new questions or concerns, I would be glad to help and can be reached through our main office at [phone] or our paging  at [phone].    31 minutes spent on the date of the encounter doing chart review, history and exam, documentation and further activities as noted above.     Petra Will MD  Pediatric Rheumatology  [facility]

## 2024-02-22 LAB
C3 SERPL-MCNC: 108 MG/DL (ref 81–157)
C4 SERPL-MCNC: 15 MG/DL (ref 13–39)
DSDNA AB SER-ACNC: 59 IU/ML

## 2024-03-14 NOTE — PROGRESS NOTES
"Data/Assessment:  Pt is a 23 y.o. female, in clinic alone today for routine SLE follow-up visit.  Her initial diagnosis was in 2016.  Pt currently lives at college in [place] during the school year, and lives in [place] at a camp during the summers.  At other times, she lives at home with her mother, father, and younger brother.  Pt is her own medical decision maker.  Pt reports that she feels \"great\" physically most of the time and that everything is currently going well.  Pt has been experiencing some excessive fatigue, but it is unclear if this is due to lupus or a very busy college schedule.  Pt reports that she is taking 5 classes and that she currently holds 3 jobs.  Pt is almost on Spring Break, and will be going to [place] in two weeks for a much-needed break.       Lorena continues to study at [employer] for writing studies, and is a [employer] this year, having one year left.  She set up a disabilities accomodations there to assist with her Lupus symptoms, and reports mixed response to getting assistance from professors.  She works as a nanny and at an art museum, as well as writing for a newspaper.  Lorena reports that all of this is too busy, and not sustainable.  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       Pt reports feeling safe in all relationships, as well as at home and at school.       Pt identifies her family and 2 close friends as her current support system. She reports that she is coping with her Lupus, but historically had a \"hard time\" with her symptoms (joint pain, fatigue, memory loss, etc.).  Pt reports that Lupus and her treatments \"sometimes\" keep her from doing what she wants to do in life.  Currently, she reports no symptoms or flares, which is a positive change from our last visit.  Lorena has noticed that the cold impacts her arthritis symptoms.       Lorena reports that she is confident in her ability to get her medications  She reports not liking the side effects of the mycophenolate, " "so she only wants to take the other ones, where she reports adherence at 100%.  These meds reportedly help with pain.  Pt states that she takes her anti-depressants  (Lexapro) 100% of the time.   Pt self-reports at a 10:10 for setting up and attending her Rheumatology appts.  Pt is able to advocate for self as needed.      Pt continues to endorse difficulty falling asleep and disrupted sleep throughout the night.  She recently started utilizing a weighted blanket and believes that it helps.  She says that she experiences better hunger cues than in the past, yet has to remind self to eat as she experiences low appetite. Eating is challenging as she has to prep food and cook, difficult when she's busy.  Lorena is active/exercises, but reports that the amount depends on her pain level which is worse with the cold (joints reportedly hurt more), but when well, she enjoys a ballet class, gymnastics, and indoor rock climbing.  Pt denies SIB, hallucinations, delusions, and current suicidality.       Pt endorses \"often\" experiencing increased anxiety and depression due to Lupus and caring for her health.  Pt believes that her anxiety was \"lupus-induced\".  MH Screening was completed today and pt self-rated as a \"12\" (previously \"8\") on the DHARA-7 (the two preceding scores were 9 and 7).  Pt has recently learned that loud noises and chaos are very triggering, and started wearing earplugs in overstimulating situations, which has been useful.  Affectively, pt appears to be doing well today.  Pt's anxiety symptoms may include: ruminating, overthinking, past social anxiety (better now), chest tightening, fidgeting, occasional panic attacks, hyperventilating, and crying.  Lorena has a specific aversion to IVs and shots, and states she she is \"triggered\" by medical shows and crowds.  Pt feels she is coping better, as she has not had any panic attacks recently.(experienced multiple last summer).       Pt endorses depression since the " "pandemic started.  Pt has previously experienced hopeless thoughts, feeling sad, crying, and lacking motivation to get out of bed at times as her constellation of depressive symptoms.  Lorena says this has improved recently.  The PHQ-9 was completed and pt self-rated as a \"13\" (previously \"8\") (past scores were \"10\" and \"7\").       The positive news is that pt has been receiving services for her mental health for a few years, including seeing a therapist in the past.  Pt restarted therapy based on our recommendations, and Rylee P at [facility]. This is reportedly a good fit, and pt reports \"getting more out of it\" since she set it up herself.       Pt continues to see an adult psychiatrist Naida, and states that it's going well.  If the Lexapro becomes less helpful, it may be useful for the pt to discuss switching to a more \"activating\" medication.  Pt has no hx of MH hospitalizations.  Pt is comfortable reaching out to her PCP, therapist, or family if she ever has suicidal thoughts or notices that her MH is deteriorating for any reason.      Transition to Adult Care              TRAQ: Completed, scored high and demonstrated proficiency in most areas              Notes: Pt has been very independent in her disease management \"for years\".  She goes to appointments alone, engages in her own medical cares, and monitors her own adherence.  No needs.  Dr Will aware that pt is ready to transition at any time.               Education materials: Previous: Transition Care Letter, Age Recommendation Handout              Education provided: on insurance     Plan/Recommendations:  **Pt has elevated MH screening scores today, but that appears to be due to situational stress.  No outlying concerns for deteriorating mental health status.     -Continue to follow-up with adult psychiatrist for medication      -Continue therapy with Rylee at [facility]     -Amenities: 2 meal vouchers           Jocelin Jean, " MSW/LICSW  Clinical /Lupus Mental Health Navigator   Pediatric Rheumatology      *no letter*

## 2024-05-15 ENCOUNTER — OFFICE VISIT (OUTPATIENT)
Dept: RHEUMATOLOGY | Facility: CLINIC | Age: 24
End: 2024-05-15
Attending: INTERNAL MEDICINE
Payer: COMMERCIAL

## 2024-05-15 VITALS
HEART RATE: 99 BPM | WEIGHT: 168.87 LBS | HEIGHT: 62 IN | BODY MASS INDEX: 31.08 KG/M2 | TEMPERATURE: 99.2 F | SYSTOLIC BLOOD PRESSURE: 121 MMHG | DIASTOLIC BLOOD PRESSURE: 68 MMHG | OXYGEN SATURATION: 96 %

## 2024-05-15 DIAGNOSIS — M32.9 SYSTEMIC LUPUS ERYTHEMATOSUS, UNSPECIFIED SLE TYPE, UNSPECIFIED ORGAN INVOLVEMENT STATUS (H): Primary | ICD-10-CM

## 2024-05-15 LAB
ALBUMIN MFR UR ELPH: 7.7 MG/DL
ALBUMIN SERPL BCG-MCNC: 4.2 G/DL (ref 3.5–5.2)
ALBUMIN UR-MCNC: NEGATIVE MG/DL
ALP SERPL-CCNC: 76 U/L (ref 40–150)
ALT SERPL W P-5'-P-CCNC: 32 U/L (ref 0–50)
APPEARANCE UR: CLEAR
AST SERPL W P-5'-P-CCNC: 29 U/L (ref 0–45)
BASOPHILS # BLD AUTO: 0 10E3/UL (ref 0–0.2)
BASOPHILS NFR BLD AUTO: 0 %
BILIRUB DIRECT SERPL-MCNC: <0.2 MG/DL (ref 0–0.3)
BILIRUB SERPL-MCNC: 0.2 MG/DL
BILIRUB UR QL STRIP: NEGATIVE
COLOR UR AUTO: ABNORMAL
CREAT SERPL-MCNC: 0.62 MG/DL (ref 0.51–0.95)
CREAT UR-MCNC: 100.6 MG/DL
CRP SERPL-MCNC: <3 MG/L
EGFRCR SERPLBLD CKD-EPI 2021: >90 ML/MIN/1.73M2
EOSINOPHIL # BLD AUTO: 0 10E3/UL (ref 0–0.7)
EOSINOPHIL NFR BLD AUTO: 1 %
ERYTHROCYTE [DISTWIDTH] IN BLOOD BY AUTOMATED COUNT: 11.7 % (ref 10–15)
GLUCOSE UR STRIP-MCNC: NEGATIVE MG/DL
HCT VFR BLD AUTO: 38.9 % (ref 35–47)
HGB BLD-MCNC: 13.2 G/DL (ref 11.7–15.7)
HGB UR QL STRIP: NEGATIVE
IMM GRANULOCYTES # BLD: 0 10E3/UL
IMM GRANULOCYTES NFR BLD: 0 %
KETONES UR STRIP-MCNC: NEGATIVE MG/DL
LEUKOCYTE ESTERASE UR QL STRIP: ABNORMAL
LYMPHOCYTES # BLD AUTO: 2.3 10E3/UL (ref 0.8–5.3)
LYMPHOCYTES NFR BLD AUTO: 39 %
MCH RBC QN AUTO: 30.4 PG (ref 26.5–33)
MCHC RBC AUTO-ENTMCNC: 33.9 G/DL (ref 31.5–36.5)
MCV RBC AUTO: 90 FL (ref 78–100)
MONOCYTES # BLD AUTO: 0.6 10E3/UL (ref 0–1.3)
MONOCYTES NFR BLD AUTO: 10 %
MUCOUS THREADS #/AREA URNS LPF: PRESENT /LPF
NEUTROPHILS # BLD AUTO: 3 10E3/UL (ref 1.6–8.3)
NEUTROPHILS NFR BLD AUTO: 50 %
NITRATE UR QL: NEGATIVE
NRBC # BLD AUTO: 0 10E3/UL
NRBC BLD AUTO-RTO: 0 /100
PH UR STRIP: 7.5 [PH] (ref 5–7)
PLATELET # BLD AUTO: 246 10E3/UL (ref 150–450)
PROT SERPL-MCNC: 7 G/DL (ref 6.4–8.3)
PROT/CREAT 24H UR: 0.08 MG/MG CR (ref 0–0.2)
RBC # BLD AUTO: 4.34 10E6/UL (ref 3.8–5.2)
RBC URINE: <1 /HPF
SP GR UR STRIP: 1.01 (ref 1–1.03)
SQUAMOUS EPITHELIAL: 1 /HPF
UROBILINOGEN UR STRIP-MCNC: NORMAL MG/DL
WBC # BLD AUTO: 5.9 10E3/UL (ref 4–11)
WBC URINE: 1 /HPF

## 2024-05-15 PROCEDURE — 84075 ASSAY ALKALINE PHOSPHATASE: CPT | Performed by: INTERNAL MEDICINE

## 2024-05-15 PROCEDURE — 86140 C-REACTIVE PROTEIN: CPT | Performed by: INTERNAL MEDICINE

## 2024-05-15 PROCEDURE — 99213 OFFICE O/P EST LOW 20 MIN: CPT | Performed by: INTERNAL MEDICINE

## 2024-05-15 PROCEDURE — 86225 DNA ANTIBODY NATIVE: CPT | Performed by: INTERNAL MEDICINE

## 2024-05-15 PROCEDURE — 82565 ASSAY OF CREATININE: CPT | Performed by: INTERNAL MEDICINE

## 2024-05-15 PROCEDURE — 84156 ASSAY OF PROTEIN URINE: CPT | Performed by: INTERNAL MEDICINE

## 2024-05-15 PROCEDURE — 86160 COMPLEMENT ANTIGEN: CPT | Performed by: INTERNAL MEDICINE

## 2024-05-15 PROCEDURE — 99214 OFFICE O/P EST MOD 30 MIN: CPT | Performed by: INTERNAL MEDICINE

## 2024-05-15 PROCEDURE — 85004 AUTOMATED DIFF WBC COUNT: CPT | Performed by: INTERNAL MEDICINE

## 2024-05-15 PROCEDURE — 84450 TRANSFERASE (AST) (SGOT): CPT | Performed by: INTERNAL MEDICINE

## 2024-05-15 PROCEDURE — 81001 URINALYSIS AUTO W/SCOPE: CPT | Performed by: INTERNAL MEDICINE

## 2024-05-15 PROCEDURE — 36415 COLL VENOUS BLD VENIPUNCTURE: CPT | Performed by: INTERNAL MEDICINE

## 2024-05-15 RX ORDER — FOLIC ACID 1 MG/1
1 TABLET ORAL DAILY
Qty: 90 TABLET | Refills: 3 | Status: SHIPPED | OUTPATIENT
Start: 2024-05-15 | End: 2024-08-15

## 2024-05-15 RX ORDER — METHOTREXATE 2.5 MG/1
15 TABLET ORAL
Qty: 24 TABLET | Refills: 5 | Status: SHIPPED | OUTPATIENT
Start: 2024-05-15 | End: 2024-05-31

## 2024-05-15 RX ORDER — TRAZODONE HYDROCHLORIDE 50 MG/1
25-50 TABLET, FILM COATED ORAL
COMMUNITY
Start: 2023-12-13

## 2024-05-15 ASSESSMENT — PAIN SCALES - GENERAL: PAINLEVEL: SEVERE PAIN (6)

## 2024-05-15 NOTE — PROGRESS NOTES
"       Medications:   As of completion of this visit:  Current Outpatient Medications   Medication Sig Dispense Refill    Cobalamine Combinations (B12 FOLATE PO)       coenzyme Q-10 75 MG CAPS Take 75 mg by mouth daily      diphenhydrAMINE (BENADRYL) 25 MG capsule Take by mouth as needed      escitalopram (LEXAPRO) 10 MG tablet Take 10 mg by mouth daily      folic acid (FOLVITE) 1 MG tablet Take 1 tablet (1 mg) by mouth daily 90 tablet 3    hydroxychloroquine (PLAQUENIL) 200 MG tablet Take 1 tablet (200 mg) by mouth daily For systemic lupus erythematosus diaganosis 90 tablet 1    hydrOXYzine (ATARAX) 25 MG tablet Take by mouth as needed      levonorgestrel (KYLEENA) 19.5 MG IUD Kyleena 17.5 mcg/24 hrs (5yrs) 19.5mg intrauterine device   Take 1 device by intrauterine route.      methotrexate 2.5 MG tablet Take 6 tablets (15 mg) by mouth every 7 days 24 tablet 5    nabumetone (RELAFEN) 500 MG tablet Take 2 tablets (1,000 mg) by mouth daily 180 tablet 2    ondansetron (ZOFRAN ODT) 4 MG ODT tab Take by mouth as needed      pantoprazole (PROTONIX) 40 MG EC tablet Take 1 tablet (40 mg) by mouth daily 90 tablet 2    prochlorperazine (COMPAZINE) 5 MG tablet Take 10 mg by mouth as needed      traZODone (DESYREL) 50 MG tablet Take 25-50 mg by mouth nightly as needed for sleep      vitamin B-12 (CYANOCOBALAMIN) 1000 MCG tablet Take by mouth once a week      vitamin D3 (CHOLECALCIFEROL) 2000 units (50 mcg) tablet Take 2,000 Units by mouth daily      AMOXICILLIN PO Take 1 tablet by mouth 1 tab daily for 10 days (Patient not taking: Reported on 5/15/2024)      Ferrous Sulfate (SLOW FE PO)  (Patient not taking: Reported on 5/15/2024)      insulin syringe 31G X 5/16\" 1 ML MISC Use as directed to give methotrexate. (Patient not taking: Reported on 5/15/2024) 100 each 11     Prescribed medications have been administered regularly, without missed doses, and the medications have been tolerated well, without side effects.         " Allergies:     Allergies   Allergen Reactions    Aloe Rash    Cats Itching         Problem list:     Patient Active Problem List    Diagnosis Date Noted    Proteinuria 01/10/2018     Priority: High    Depression, unspecified depression type 03/04/2022     Priority: Medium    Other systemic lupus erythematosus with other organ involvement (H) 07/31/2017     Priority: Medium    Anxiety 09/26/2016     Priority: Medium    Systemic lupus erythematosus (H) 09/10/2016     Priority: Medium     At diagnosis: Lorena is a 15 year old girl with history of chronic abdominal pain, diarrhea and weight loss,, lymphadenopathy, elevated inflammatory markers and recurrent fevers with new diagnosis of systemic lupus erythematosis (SLE) after admission and work-up performed at [facility]. Criteria consistent with SLE include arthritis, Leydi positive anemia, positive KAIA, and positive dsDNA. She also has positive anti-Ro and anti-RNP antibody but is anti-Smith negative. Her C3 and C4 are both low, with C4 being undetectable. Given one dose of IVMP 30 mg/kg, then oral prednisone 30 mg BID. Eventually tapered off. Mycophenolate and hydroxychloroquine ongoing. SSA and SSB repeats were negative.     Arthritis (now improved), positive dsDNA, hypocomplementemia, Leydi positive (now negative).     No nephritis, ECHO and PFTs within normal limits    [facility])    Brain MRI essentially normal but suboptimal evaluation due to braces. Anti-neuronal antibody and ribosomal P antibodies negative.     Primary rheumatologic diagnosis:systemic lupus erythematosus  Date of symptom onset:8/2015  Date of first visit to center:9/9/16  Date of diagnosis:8/31/16       Presenting clinical features included:  Abdominal pain, diarrhea, weight loss, lymphadenopathy elevated ESR     Lab evaluation at onset notable for:   dsDNA>1000, KAIA positive, SSA 71.1 (normal <20), SSB 5.5, Sm  12, U1-RNP/Sm 86.4, Scl-70 2.3, Amber-1 3.6, TSH normal, repeat CBC on 9/1/16 was normal, cryptosporidium/giardia negative, ova and parasites negative, Q fever negative, HIV negative, blood culture no growth, cryptococcus negative, CK 27, C3 54 (), C4 3.2, IgA 262.0, IgG 1760 (734-1570), IgM 57.4 (normal), haptoglobin normal, RF negative, beta-2-glycoprotein IgG/IgM negative, cardiolipin IgG 12 (normal up to 10), cardiolipin IgM <4, direct Leydi positive, TICO anti-C3 negative, DRVVT ration normal      Notable imaging or pathology:   GI who performed upper and lower endoscopies with biopsies. Biopsies showed only mild inflammation in the duodenum and colon. She also had an MR enterography on 8/16/16 which did not show bowel disease but did show mildly prominent adenopathy in several areas.      Treatment history:   IVMP 30 mg/kg once during hospitalization, then 30 mg prednisone BID, tapered over several months; mycophenolate, hydroxychloroquine      Chronic abdominal pain 09/10/2016     Priority: Medium          Subjective:     Lorena is a 23 year old female who was seen in Pediatric Rheumatology clinic today for follow up of systemic lupus erythematosus (SLE). Lorena is unaccompanied today. At the last visit 3 months ago, she was doing well thus we made no changes to therapies. Since that time she has been doing well, overall.     She just finished john year of college. Will spend time in [place] then work at [employer] again this summer.     Currently she is sick with an infection. A couple weeks ago she had an ear infection. She's not sure if it's a continuation of that or if it's a new cold. 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 Only got 5 hours of sleep last night, so she's not feeling well today.     Her arthritis has been worse lately, especially in the hands and wrists. It starts in the wrists and it spreads to the hands. If she's up too late her entire body will be in pain. She had a bad trigger  "finger for a day or two recently. Had this when she was first diagnosed as well. Otherwise, no other symptoms of lupus such as chest pain, trouble breathing, rash Raynaud's, significant Gi issues.     Not missing much medication.     Has been working through a possible ADHD diagnosis.     A 14-point review of systems was negative.          Examination:   Blood pressure 121/68, pulse 99, temperature 99.2  F (37.3  C), temperature source Tympanic, height 1.58 m (5' 2.21\"), weight 76.6 kg (168 lb 14 oz), SpO2 96%.  Facility age limit for growth %misael is 20 years.  Growth %ile SmartLinks can only be used for patients less than 20 years old.  Gen: Pleasant, well-appearing, NAD  HEENT/Neck: TM's clear bilaterally, oropharynx is clear without lesions, neck is supple with no lymphadenopathy                  CV: Regular rate and rhythm, normal S1, S2, no murmurs  Resp: Clear to ascultation bilaterally  Abd: Soft, non-tender, non-distended, no hepatosplenomegaly  Skin: Clear, there is no rash  MSK: All joints were examined including TMJ, sternoclavicular, acromioclavicular, neck, shoulder, elbow, wrist, hips, knees, ankles, fingers, and toes, and all were normal          Last Lab Results:     Office Visit on 05/15/2024   Component Date Value    C3 Complement 05/15/2024 103     C4 Complement 05/15/2024 15     Creatinine 05/15/2024 0.62     GFR Estimate 05/15/2024 >90     CRP Inflammation 05/15/2024 <3.00     DNA (ds) Antibody 05/15/2024 44.0 (H)     Protein Total 05/15/2024 7.0     Albumin 05/15/2024 4.2     Bilirubin Total 05/15/2024 0.2     Alkaline Phosphatase 05/15/2024 76     AST 05/15/2024 29     ALT 05/15/2024 32     Bilirubin Direct 05/15/2024 <0.20     Color Urine 05/15/2024 Light Yellow     Appearance Urine 05/15/2024 Clear     Glucose Urine 05/15/2024 Negative     Bilirubin Urine 05/15/2024 Negative     Ketones Urine 05/15/2024 Negative     Specific Gravity Urine 05/15/2024 1.014     Blood Urine 05/15/2024 Negative  "    pH Urine 05/15/2024 7.5 (H)     Protein Albumin Urine 05/15/2024 Negative     Urobilinogen Urine 05/15/2024 Normal     Nitrite Urine 05/15/2024 Negative     Leukocyte Esterase Urine 05/15/2024 Trace (A)     Mucus Urine 05/15/2024 Present (A)     RBC Urine 05/15/2024 <1     WBC Urine 05/15/2024 1     Squamous Epithelials Uri* 05/15/2024 1     Total Protein Urine mg/dL 05/15/2024 7.7     Total Protein Urine mg/m* 05/15/2024 0.08     Creatinine Urine mg/dL 05/15/2024 100.6     WBC Count 05/15/2024 5.9     RBC Count 05/15/2024 4.34     Hemoglobin 05/15/2024 13.2     Hematocrit 05/15/2024 38.9     MCV 05/15/2024 90     MCH 05/15/2024 30.4     MCHC 05/15/2024 33.9     RDW 05/15/2024 11.7     Platelet Count 05/15/2024 246     % Neutrophils 05/15/2024 50     % Lymphocytes 05/15/2024 39     % Monocytes 05/15/2024 10     % Eosinophils 05/15/2024 1     % Basophils 05/15/2024 0     % Immature Granulocytes 05/15/2024 0     NRBCs per 100 WBC 05/15/2024 0     Absolute Neutrophils 05/15/2024 3.0     Absolute Lymphocytes 05/15/2024 2.3     Absolute Monocytes 05/15/2024 0.6     Absolute Eosinophils 05/15/2024 0.0     Absolute Basophils 05/15/2024 0.0     Absolute Immature Granul* 05/15/2024 0.0     Absolute NRBCs 05/15/2024 0.0             Assessment:     Lorena is a 23 year old female with systemic lupus erythematosus. She is treated with hydroxychloroquine and nabumetone. Her lupus appears to be under good control other than arthralgia. Her labs today are reassuring and her dsDNA has trended down, which is reassuring. However, she is having worsening joint pain. Her joint pain had been well controlled on methotrexate. I recommend that we restart it, and she agreed with this. I do not detect arthritis, but she has a pattern of improved joint pain on methotrexate.      We previously discussed transition to adult care since she is 23 and she is independent with her healthcare. She would like to switch to Dr. Lerman. We discussed that  Dr. Lerman will start at [facility] in January. I can continue to see Lorena until that time, or I can check with Dr. Lerman to see if she can see Lorena for a first visit in the pediatric clinic while she's still working at the [employer]. Lorena liked that idea, so I will look into it.          Plan:     Monitoring labs were obtained today.   Continue current therapies.   I will look into whether Lorena can see Dr. Lerman for the first visit at the pediatric clinic here, then transition to [facility] with her.    I recommend annual eye exams while on hydroxychlorquine (Plaquenil).   Return in about 3 months (around 8/15/2024). Call sooner with any concerns.     If there are any new questions or concerns, I would be glad to help and can be reached through our main office at [phone] or our paging  at [phone].    30 minutes spent on the date of the encounter doing chart review, history and exam, documentation and further activities as noted above.     Petra Will MD  Pediatric Rheumatology  [facility]

## 2024-05-15 NOTE — PATIENT INSTRUCTIONS
For Patient Education Materials:  z.[facility]/jacky       [facility] Pediatric Rheumatology    For Help:  The Pediatric Call Center at [phone] can help with scheduling of routine follow up visits.  Yana Huerta and Alcira Roe are the Nurse Coordinators for the Division of Pediatric Rheumatology and can be reached by phone at [phone] or through [employer] ([email or web address]). They can help with questions about your child s rheumatic condition, medications, and test results.  For emergencies after hours or on the weekends, please call the page  at [phone] and ask to speak to the physician on-call for Pediatric Rheumatology. Please do not use [employer] for urgent requests.  Main  Services:  [phone]  Hmong/Taiwanese/Estonian: [phone]  [place]er: [phone]  Wallisian: [phone]    Internal Referrals: If we refer your child to another physician/team within [facility], you should receive a call to set this up. If you do not hear anything within a week, please call the Call Center at [phone].    External Referrals: If we refer your child to a physician/team outside of [facility], our team will send the referral order and relevant records to them. We ask that you call the place where your child is being referred to ensure they received the needed information and notify our team coordinators if not.    Imaging: If your child needs an imaging study that is not being performed the day of your clinic appointment, please call to set this up. For xrays, ultrasounds, and echocardiogram call [phone]. For CT or MRI call [phone].     MyChart: We encourage you to sign up for [email or web address] at [email or web address]. For assistance or questions, call [phone]. If your child is 12 years or older, a consent for proxy/parent access needs to be signed so please discuss this with your physician at the next visit.

## 2024-05-15 NOTE — LETTER
5/15/2024      RE: Lorena Fritz  [address] [facility] [address]     Dear Colleague,    Thank you for the opportunity to participate in the care of your patient, Lorena Fritz, at the [facility] at [facility]. Please see a copy of my visit note below.           Medications:   As of completion of this visit:  Current Outpatient Medications   Medication Sig Dispense Refill     Cobalamine Combinations (B12 FOLATE PO)        coenzyme Q-10 75 MG CAPS Take 75 mg by mouth daily       diphenhydrAMINE (BENADRYL) 25 MG capsule Take by mouth as needed       escitalopram (LEXAPRO) 10 MG tablet Take 10 mg by mouth daily       folic acid (FOLVITE) 1 MG tablet Take 1 tablet (1 mg) by mouth daily 90 tablet 3     hydroxychloroquine (PLAQUENIL) 200 MG tablet Take 1 tablet (200 mg) by mouth daily For systemic lupus erythematosus diaganosis 90 tablet 1     hydrOXYzine (ATARAX) 25 MG tablet Take by mouth as needed       levonorgestrel (KYLEENA) 19.5 MG IUD Kyleena 17.5 mcg/24 hrs (5yrs) 19.5mg intrauterine device   Take 1 device by intrauterine route.       methotrexate 2.5 MG tablet Take 6 tablets (15 mg) by mouth every 7 days 24 tablet 5     nabumetone (RELAFEN) 500 MG tablet Take 2 tablets (1,000 mg) by mouth daily 180 tablet 2     ondansetron (ZOFRAN ODT) 4 MG ODT tab Take by mouth as needed       pantoprazole (PROTONIX) 40 MG EC tablet Take 1 tablet (40 mg) by mouth daily 90 tablet 2     prochlorperazine (COMPAZINE) 5 MG tablet Take 10 mg by mouth as needed       traZODone (DESYREL) 50 MG tablet Take 25-50 mg by mouth nightly as needed for sleep       vitamin B-12 (CYANOCOBALAMIN) 1000 MCG tablet Take by mouth once a week       vitamin D3 (CHOLECALCIFEROL) 2000 units (50 mcg) tablet Take 2,000 Units by mouth daily       AMOXICILLIN PO Take 1 tablet by mouth 1 tab daily for 10 days (Patient not taking: Reported on 5/15/2024)        "Ferrous Sulfate (SLOW FE PO)  (Patient not taking: Reported on 5/15/2024)       insulin syringe 31G X 5/16\" 1 ML MISC Use as directed to give methotrexate. (Patient not taking: Reported on 5/15/2024) 100 each 11     Prescribed medications have been administered regularly, without missed doses, and the medications have been tolerated well, without side effects.         Allergies:     Allergies   Allergen Reactions     Aloe Rash     Cats Itching         Problem list:     Patient Active Problem List    Diagnosis Date Noted     Proteinuria 01/10/2018     Priority: High     Depression, unspecified depression type 03/04/2022     Priority: Medium     Other systemic lupus erythematosus with other organ involvement (H) 07/31/2017     Priority: Medium     Anxiety 09/26/2016     Priority: Medium     Systemic lupus erythematosus (H) 09/10/2016     Priority: Medium     At diagnosis: Lorena is a 15 year old girl with history of chronic abdominal pain, diarrhea and weight loss,, lymphadenopathy, elevated inflammatory markers and recurrent fevers with new diagnosis of systemic lupus erythematosis (SLE) after admission and work-up performed at [facility]. Criteria consistent with SLE include arthritis, Leydi positive anemia, positive KAIA, and positive dsDNA. She also has positive anti-Ro and anti-RNP antibody but is anti-Smith negative. Her C3 and C4 are both low, with C4 being undetectable. Given one dose of IVMP 30 mg/kg, then oral prednisone 30 mg BID. Eventually tapered off. Mycophenolate and hydroxychloroquine ongoing. SSA and SSB repeats were negative.     Arthritis (now improved), positive dsDNA, hypocomplementemia, Leydi positive (now negative).     No nephritis, ECHO and PFTs within normal limits    [facility])    Brain MRI essentially normal but suboptimal evaluation due to braces. Anti-neuronal antibody and ribosomal P antibodies negative. "     Primary rheumatologic diagnosis:systemic lupus erythematosus  Date of symptom onset:8/2015  Date of first visit to center:9/9/16  Date of diagnosis:8/31/16       Presenting clinical features included:  Abdominal pain, diarrhea, weight loss, lymphadenopathy elevated ESR     Lab evaluation at onset notable for:   dsDNA>1000, KAIA positive, SSA 71.1 (normal <20), SSB 5.5, Sm 12, U1-RNP/Sm 86.4, Scl-70 2.3, Amber-1 3.6, TSH normal, repeat CBC on 9/1/16 was normal, cryptosporidium/giardia negative, ova and parasites negative, Q fever negative, HIV negative, blood culture no growth, cryptococcus negative, CK 27, C3 54 (), C4 3.2, IgA 262.0, IgG 1760 (734-1570), IgM 57.4 (normal), haptoglobin normal, RF negative, beta-2-glycoprotein IgG/IgM negative, cardiolipin IgG 12 (normal up to 10), cardiolipin IgM <4, direct Leydi positive, TICO anti-C3 negative, DRVVT ration normal      Notable imaging or pathology:   GI who performed upper and lower endoscopies with biopsies. Biopsies showed only mild inflammation in the duodenum and colon. She also had an MR enterography on 8/16/16 which did not show bowel disease but did show mildly prominent adenopathy in several areas.      Treatment history:   IVMP 30 mg/kg once during hospitalization, then 30 mg prednisone BID, tapered over several months; mycophenolate, hydroxychloroquine       Chronic abdominal pain 09/10/2016     Priority: Medium          Subjective:     Lorena is a 23 year old female who was seen in Pediatric Rheumatology clinic today for follow up of systemic lupus erythematosus (SLE). Lorena is unaccompanied today. At the last visit 3 months ago, she was doing well thus we made no changes to therapies. Since that time she has been doing well, overall.     She just finished john year of college. Will spend time in [place] then work at [employer] again this summer.     Currently she is sick with an infection. A couple weeks ago she had an ear infection. She's not sure if  "it's a continuation of that or if it's a new cold. 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 Only got 5 hours of sleep last night, so she's not feeling well today.     Her arthritis has been worse lately, especially in the hands and wrists. It starts in the wrists and it spreads to the hands. If she's up too late her entire body will be in pain. She had a bad trigger finger for a day or two recently. Had this when she was first diagnosed as well. Otherwise, no other symptoms of lupus such as chest pain, trouble breathing, rash Raynaud's, significant Gi issues.     Not missing much medication.     Has been working through a possible ADHD diagnosis.     A 14-point review of systems was negative.          Examination:   Blood pressure 121/68, pulse 99, temperature 99.2  F (37.3  C), temperature source Tympanic, height 1.58 m (5' 2.21\"), weight 76.6 kg (168 lb 14 oz), SpO2 96%.  Facility age limit for growth %misael is 20 years.  Growth %ile SmartLinks can only be used for patients less than 20 years old.  Gen: Pleasant, well-appearing, NAD  HEENT/Neck: TM's clear bilaterally, oropharynx is clear without lesions, neck is supple with no lymphadenopathy                  CV: Regular rate and rhythm, normal S1, S2, no murmurs  Resp: Clear to ascultation bilaterally  Abd: Soft, non-tender, non-distended, no hepatosplenomegaly  Skin: Clear, there is no rash  MSK: All joints were examined including TMJ, sternoclavicular, acromioclavicular, neck, shoulder, elbow, wrist, hips, knees, ankles, fingers, and toes, and all were normal          Last Lab Results:     Office Visit on 05/15/2024   Component Date Value     C3 Complement 05/15/2024 103      C4 Complement 05/15/2024 15      Creatinine 05/15/2024 0.62      GFR Estimate 05/15/2024 >90      CRP Inflammation 05/15/2024 <3.00      DNA (ds) Antibody 05/15/2024 44.0 (H)      Protein Total 05/15/2024 7.0      Albumin 05/15/2024 4.2      Bilirubin Total 05/15/2024 0.2      " Alkaline Phosphatase 05/15/2024 76      AST 05/15/2024 29      ALT 05/15/2024 32      Bilirubin Direct 05/15/2024 <0.20      Color Urine 05/15/2024 Light Yellow      Appearance Urine 05/15/2024 Clear      Glucose Urine 05/15/2024 Negative      Bilirubin Urine 05/15/2024 Negative      Ketones Urine 05/15/2024 Negative      Specific Gravity Urine 05/15/2024 1.014      Blood Urine 05/15/2024 Negative      pH Urine 05/15/2024 7.5 (H)      Protein Albumin Urine 05/15/2024 Negative      Urobilinogen Urine 05/15/2024 Normal      Nitrite Urine 05/15/2024 Negative      Leukocyte Esterase Urine 05/15/2024 Trace (A)      Mucus Urine 05/15/2024 Present (A)      RBC Urine 05/15/2024 <1      WBC Urine 05/15/2024 1      Squamous Epithelials Uri* 05/15/2024 1      Total Protein Urine mg/dL 05/15/2024 7.7      Total Protein Urine mg/m* 05/15/2024 0.08      Creatinine Urine mg/dL 05/15/2024 100.6      WBC Count 05/15/2024 5.9      RBC Count 05/15/2024 4.34      Hemoglobin 05/15/2024 13.2      Hematocrit 05/15/2024 38.9      MCV 05/15/2024 90      MCH 05/15/2024 30.4      MCHC 05/15/2024 33.9      RDW 05/15/2024 11.7      Platelet Count 05/15/2024 246      % Neutrophils 05/15/2024 50      % Lymphocytes 05/15/2024 39      % Monocytes 05/15/2024 10      % Eosinophils 05/15/2024 1      % Basophils 05/15/2024 0      % Immature Granulocytes 05/15/2024 0      NRBCs per 100 WBC 05/15/2024 0      Absolute Neutrophils 05/15/2024 3.0      Absolute Lymphocytes 05/15/2024 2.3      Absolute Monocytes 05/15/2024 0.6      Absolute Eosinophils 05/15/2024 0.0      Absolute Basophils 05/15/2024 0.0      Absolute Immature Granul* 05/15/2024 0.0      Absolute NRBCs 05/15/2024 0.0             Assessment:     Lorena is a 23 year old female with systemic lupus erythematosus. She is treated with hydroxychloroquine and nabumetone. Her lupus appears to be under good control other than arthralgia. Her labs today are reassuring and her dsDNA has trended down,  which is reassuring. However, she is having worsening joint pain. Her joint pain had been well controlled on methotrexate. I recommend that we restart it, and she agreed with this. I do not detect arthritis, but she has a pattern of improved joint pain on methotrexate.      We previously discussed transition to adult care since she is 23 and she is independent with her healthcare. She would like to switch to Dr. Lerman. We discussed that Dr. Lerman will start at [facility] in January. I can continue to see Lorena until that time, or I can check with Dr. Lerman to see if she can see Lorena for a first visit in the pediatric clinic while she's still working at the [employer]. Lorena liked that idea, so I will look into it.          Plan:     Monitoring labs were obtained today.   Continue current therapies.   I will look into whether Lorena can see Dr. Lerman for the first visit at the pediatric clinic here, then transition to [facility] with her.    I recommend annual eye exams while on hydroxychlorquine (Plaquenil).   Return in about 3 months (around 8/15/2024). Call sooner with any concerns.     If there are any new questions or concerns, I would be glad to help and can be reached through our main office at [phone] or our paging  at [phone].    30 minutes spent on the date of the encounter doing chart review, history and exam, documentation and further activities as noted above.     Petra Will MD  Pediatric Rheumatology  [facility]

## 2024-05-15 NOTE — NURSING NOTE
"Chief Complaint   Patient presents with    RECHECK       Vitals:    05/15/24 1443   BP: 121/68   BP Location: Right arm   Patient Position: Sitting   Cuff Size: Adult Regular   Pulse: 99   Temp: 99.2  F (37.3  C)   TempSrc: Tympanic   SpO2: 96%   Weight: 168 lb 14 oz (76.6 kg)   Height: 5' 2.21\" (158 cm)           Patient MyChart Active? Yes  If no, would they like to sign up? N/A    Has patient signed a Consent to Communicate form to discuss health information with guardians if applicable? Does not apply     Does patient need PHQ-2 completed today? No      Holly Jackson  May 15, 2024  "

## 2024-05-16 LAB
C3 SERPL-MCNC: 103 MG/DL (ref 81–157)
C4 SERPL-MCNC: 15 MG/DL (ref 13–39)
DSDNA AB SER-ACNC: 44 IU/ML

## 2024-05-31 DIAGNOSIS — M32.9 SYSTEMIC LUPUS ERYTHEMATOSUS, UNSPECIFIED SLE TYPE, UNSPECIFIED ORGAN INVOLVEMENT STATUS (H): ICD-10-CM

## 2024-05-31 DIAGNOSIS — M32.9 SYSTEMIC LUPUS ERYTHEMATOSUS, UNSPECIFIED SLE TYPE, UNSPECIFIED ORGAN INVOLVEMENT STATUS (H): Primary | ICD-10-CM

## 2024-05-31 RX ORDER — METHOTREXATE 2.5 MG/1
15 TABLET ORAL
Qty: 24 TABLET | Refills: 3 | Status: SHIPPED | OUTPATIENT
Start: 2024-05-31 | End: 2024-07-02

## 2024-05-31 RX ORDER — HYDROXYCHLOROQUINE SULFATE 200 MG/1
200 TABLET, FILM COATED ORAL DAILY
Qty: 90 TABLET | Refills: 0 | Status: SHIPPED | OUTPATIENT
Start: 2024-05-31 | End: 2024-08-15

## 2024-07-02 DIAGNOSIS — M32.9 SYSTEMIC LUPUS ERYTHEMATOSUS, UNSPECIFIED SLE TYPE, UNSPECIFIED ORGAN INVOLVEMENT STATUS (H): Primary | ICD-10-CM

## 2024-07-02 RX ORDER — METHOTREXATE 2.5 MG/1
15 TABLET ORAL
Qty: 24 TABLET | Refills: 3 | Status: SHIPPED | OUTPATIENT
Start: 2024-07-02 | End: 2024-08-15

## 2024-07-02 RX ORDER — NABUMETONE 500 MG/1
1000 TABLET, FILM COATED ORAL DAILY
Qty: 180 TABLET | Refills: 2 | Status: SHIPPED | OUTPATIENT
Start: 2024-07-02

## 2024-08-15 ENCOUNTER — OFFICE VISIT (OUTPATIENT)
Dept: RHEUMATOLOGY | Facility: CLINIC | Age: 24
End: 2024-08-15
Attending: PEDIATRICS
Payer: COMMERCIAL

## 2024-08-15 VITALS
DIASTOLIC BLOOD PRESSURE: 77 MMHG | TEMPERATURE: 98.7 F | WEIGHT: 172.84 LBS | SYSTOLIC BLOOD PRESSURE: 116 MMHG | HEART RATE: 81 BPM | BODY MASS INDEX: 30.62 KG/M2 | HEIGHT: 63 IN | OXYGEN SATURATION: 97 %

## 2024-08-15 DIAGNOSIS — M32.9 SYSTEMIC LUPUS ERYTHEMATOSUS, UNSPECIFIED SLE TYPE, UNSPECIFIED ORGAN INVOLVEMENT STATUS (H): Primary | ICD-10-CM

## 2024-08-15 LAB
ALBUMIN MFR UR ELPH: <6 MG/DL
ALBUMIN SERPL BCG-MCNC: 3.9 G/DL (ref 3.5–5.2)
ALBUMIN UR-MCNC: NEGATIVE MG/DL
ALP SERPL-CCNC: 72 U/L (ref 40–150)
ALT SERPL W P-5'-P-CCNC: 45 U/L (ref 0–50)
ANION GAP SERPL CALCULATED.3IONS-SCNC: 9 MMOL/L (ref 7–15)
APPEARANCE UR: CLEAR
AST SERPL W P-5'-P-CCNC: 30 U/L (ref 0–45)
BACTERIA #/AREA URNS HPF: ABNORMAL /HPF
BASOPHILS # BLD AUTO: 0 10E3/UL (ref 0–0.2)
BASOPHILS NFR BLD AUTO: 0 %
BILIRUB SERPL-MCNC: <0.2 MG/DL
BILIRUB UR QL STRIP: NEGATIVE
BUN SERPL-MCNC: 10 MG/DL (ref 6–20)
CALCIUM SERPL-MCNC: 9.3 MG/DL (ref 8.8–10.4)
CHLORIDE SERPL-SCNC: 107 MMOL/L (ref 98–107)
COLOR UR AUTO: YELLOW
CREAT SERPL-MCNC: 0.63 MG/DL (ref 0.51–0.95)
CREAT UR-MCNC: 75.6 MG/DL
CRP SERPL-MCNC: <3 MG/L
EGFRCR SERPLBLD CKD-EPI 2021: >90 ML/MIN/1.73M2
EOSINOPHIL # BLD AUTO: 0 10E3/UL (ref 0–0.7)
EOSINOPHIL NFR BLD AUTO: 1 %
ERYTHROCYTE [DISTWIDTH] IN BLOOD BY AUTOMATED COUNT: 12.4 % (ref 10–15)
ERYTHROCYTE [SEDIMENTATION RATE] IN BLOOD BY WESTERGREN METHOD: 34 MM/HR (ref 0–20)
GLUCOSE SERPL-MCNC: 103 MG/DL (ref 70–99)
GLUCOSE UR STRIP-MCNC: NEGATIVE MG/DL
HCO3 SERPL-SCNC: 25 MMOL/L (ref 22–29)
HCT VFR BLD AUTO: 39.5 % (ref 35–47)
HGB BLD-MCNC: 13.5 G/DL (ref 11.7–15.7)
HGB UR QL STRIP: NEGATIVE
IMM GRANULOCYTES # BLD: 0 10E3/UL
IMM GRANULOCYTES NFR BLD: 0 %
KETONES UR STRIP-MCNC: NEGATIVE MG/DL
LEUKOCYTE ESTERASE UR QL STRIP: NEGATIVE
LYMPHOCYTES # BLD AUTO: 2 10E3/UL (ref 0.8–5.3)
LYMPHOCYTES NFR BLD AUTO: 36 %
MCH RBC QN AUTO: 31.1 PG (ref 26.5–33)
MCHC RBC AUTO-ENTMCNC: 34.2 G/DL (ref 31.5–36.5)
MCV RBC AUTO: 91 FL (ref 78–100)
MONOCYTES # BLD AUTO: 0.4 10E3/UL (ref 0–1.3)
MONOCYTES NFR BLD AUTO: 7 %
MUCOUS THREADS #/AREA URNS LPF: PRESENT /LPF
NEUTROPHILS # BLD AUTO: 3 10E3/UL (ref 1.6–8.3)
NEUTROPHILS NFR BLD AUTO: 56 %
NITRATE UR QL: NEGATIVE
NRBC # BLD AUTO: 0 10E3/UL
NRBC BLD AUTO-RTO: 0 /100
PH UR STRIP: 6.5 [PH] (ref 5–7)
PLATELET # BLD AUTO: 220 10E3/UL (ref 150–450)
POTASSIUM SERPL-SCNC: 4.7 MMOL/L (ref 3.4–5.3)
PROT SERPL-MCNC: 7.3 G/DL (ref 6.4–8.3)
PROT/CREAT 24H UR: NORMAL MG/G{CREAT}
RBC # BLD AUTO: 4.34 10E6/UL (ref 3.8–5.2)
RBC URINE: 1 /HPF
SODIUM SERPL-SCNC: 141 MMOL/L (ref 135–145)
SP GR UR STRIP: 1.02 (ref 1–1.03)
SQUAMOUS EPITHELIAL: 1 /HPF
UROBILINOGEN UR STRIP-MCNC: 0.2 MG/DL
WBC # BLD AUTO: 5.4 10E3/UL (ref 4–11)
WBC URINE: 1 /HPF

## 2024-08-15 PROCEDURE — 81001 URINALYSIS AUTO W/SCOPE: CPT | Performed by: STUDENT IN AN ORGANIZED HEALTH CARE EDUCATION/TRAINING PROGRAM

## 2024-08-15 PROCEDURE — 99213 OFFICE O/P EST LOW 20 MIN: CPT | Performed by: STUDENT IN AN ORGANIZED HEALTH CARE EDUCATION/TRAINING PROGRAM

## 2024-08-15 PROCEDURE — 86160 COMPLEMENT ANTIGEN: CPT | Performed by: STUDENT IN AN ORGANIZED HEALTH CARE EDUCATION/TRAINING PROGRAM

## 2024-08-15 PROCEDURE — 85025 COMPLETE CBC W/AUTO DIFF WBC: CPT | Performed by: STUDENT IN AN ORGANIZED HEALTH CARE EDUCATION/TRAINING PROGRAM

## 2024-08-15 PROCEDURE — 36415 COLL VENOUS BLD VENIPUNCTURE: CPT | Performed by: STUDENT IN AN ORGANIZED HEALTH CARE EDUCATION/TRAINING PROGRAM

## 2024-08-15 PROCEDURE — 82247 BILIRUBIN TOTAL: CPT | Performed by: STUDENT IN AN ORGANIZED HEALTH CARE EDUCATION/TRAINING PROGRAM

## 2024-08-15 PROCEDURE — 99214 OFFICE O/P EST MOD 30 MIN: CPT | Mod: GC | Performed by: PEDIATRICS

## 2024-08-15 PROCEDURE — 86140 C-REACTIVE PROTEIN: CPT | Performed by: STUDENT IN AN ORGANIZED HEALTH CARE EDUCATION/TRAINING PROGRAM

## 2024-08-15 PROCEDURE — 86225 DNA ANTIBODY NATIVE: CPT | Performed by: STUDENT IN AN ORGANIZED HEALTH CARE EDUCATION/TRAINING PROGRAM

## 2024-08-15 PROCEDURE — 85652 RBC SED RATE AUTOMATED: CPT | Performed by: STUDENT IN AN ORGANIZED HEALTH CARE EDUCATION/TRAINING PROGRAM

## 2024-08-15 PROCEDURE — 84156 ASSAY OF PROTEIN URINE: CPT | Performed by: STUDENT IN AN ORGANIZED HEALTH CARE EDUCATION/TRAINING PROGRAM

## 2024-08-15 RX ORDER — HYDROXYCHLOROQUINE SULFATE 200 MG/1
200 TABLET, FILM COATED ORAL DAILY
Qty: 90 TABLET | Refills: 0 | Status: SHIPPED | OUTPATIENT
Start: 2024-08-15

## 2024-08-15 RX ORDER — ESCITALOPRAM OXALATE 20 MG/1
TABLET ORAL
COMMUNITY
Start: 2024-07-02

## 2024-08-15 ASSESSMENT — PAIN SCALES - GENERAL: PAINLEVEL: SEVERE PAIN (6)

## 2024-08-15 NOTE — NURSING NOTE
"Chief Complaint   Patient presents with    RECHECK       Vitals:    08/15/24 0845   BP: 116/77   BP Location: Right arm   Patient Position: Sitting   Cuff Size: Adult Regular   Pulse: 81   Temp: 98.7  F (37.1  C)   TempSrc: Tympanic   SpO2: 97%   Weight: 172 lb 13.5 oz (78.4 kg)   Height: 5' 2.56\" (158.9 cm)         Patient MyChart Active? Yes  If no, would they like to sign up? N/A  Consent form signed? Yes    Has patient signed a Consent to Communicate form to discuss health information with guardians if applicable? No  What is the patient's phone number or best contact for communication?      Does patient need PHQ-2 completed today? No      Hloly Jackson  August 15, 2024  "

## 2024-08-15 NOTE — PROGRESS NOTES
Rheumatologic History:    Primary rheumatologic diagnosis: systemic lupus erythematosus  Date of symptom onset: 8/2015  Date of first visit to center: 9/9/16  Date of diagnosis: 8/31/16       Presenting clinical features included:  At diagnosis: Lorena is a 15 year old girl with history of chronic abdominal pain, diarrhea and weight loss,, lymphadenopathy, elevated inflammatory markers and recurrent fevers with new diagnosis of systemic lupus erythematosis (SLE) after admission and work-up performed at [facility]. Criteria consistent with SLE include arthritis, Leydi positive anemia, positive KAIA, and positive dsDNA. She also has positive anti-Ro and anti-RNP antibody but is anti-Smith negative. Her C3 and C4 are both low, with C4 being undetectable. Given one dose of IVMP 30 mg/kg, then oral prednisone 30 mg BID. Eventually tapered off. Mycophenolate and hydroxychloroquine ongoing. SSA and SSB repeats were negative.    Presenting clinical features included:  Abdominal pain, diarrhea, weight loss, lymphadenopathy, elevated ESR     Lab evaluation at onset notable for:   dsDNA>1000, KAIA positive, SSA 71.1 (normal <20), SSB 5.5, Sm 12, U1-RNP/Sm 86.4, Scl-70 2.3, Amber-1 3.6, Anti-neuronal antibody and ribosomal P antibodies negative. TSH normal, repeat CBC on 9/1/16 was normal, cryptosporidium/giardia negative, ova and parasites negative, Q fever negative, HIV negative, blood culture no growth, cryptococcus negative, CK 27, C3 54 (), C4 3.2, IgA 262.0, IgG 1760 (734-1570), IgM 57.4 (normal), haptoglobin normal, RF negative, beta-2-glycoprotein IgG/IgM negative, cardiolipin IgG 12 (normal up to 10), cardiolipin IgM <4, direct Leydi positive, TICO anti-C3 negative, DRVVT ratio normal.      Notable imaging or pathology:   GI performed upper and lower endoscopies; biopsies showed only mild inflammation in the duodenum and colon. She also had an MR enterography on 8/16/16 which did not  show bowel disease but did show mildly prominent adenopathy in several areas.   Brain MRI essentially normal but some scattered T2 hyperintensities in 2016. Normal MRA 2016.      Treatment history:   IVMP 30 mg/kg once during hospitalization, then 30 mg prednisone BID, tapered over several months  Hydroxychloroquine: -2024  Mycophenolate: 2016-2024  Methotrexate: Never started    Health Maintenance:    Last Eye Examination: 2023, but no retinal exam for several years.  Last Pulmonary Screening: PFT /DLCO:2016.  Chest imaging:CXR 2017.   Last Cardiovascular Screening: ECHO:22, normal. EK2022, normal. Lipid panel (should be every 3-5 years): not yet done  Last Bone Health Screening: DEXA: not yet indicated.  Last Neuropsych Testing:3/14/22.  Last TB screen:  2016    Last influenza vaccination:2023  Last COVID vaccination:2022   Last pneumococcal vaccination: (which one, PCV23): not yet done  Last shingles vaccination (if over age 19): not yet done      Subjective:    Lorena is a 23 year old female who was seen in Pediatric Rheumatology clinic today for follow up of systemic lupus erythematosus (SLE). Lorena was last seen in our clinic on 5/15/2024 and returns today to meet with me to discuss transition of care to adult rheumatology. She was last seen by Dr. Will  of pediatric rheumatology in 2024.     Since Lorena s last visit, she has overall been doing ok. She has joint pain in her hips, knees, and hands that bothers her most at the end of the day. She will have some discomfort or stiffness during the first 15-30 minutes of the morning. No joints have been swollen recently, but she does feel that during the school year when she is writing a lot her hands do get swollen. She recalls it can be hard for her to write more than a page at a time, which can be problematic for her. She takes Relafen daily and feels this definitely helps her joint pain. She has not been taking  methotrexate. She states she was worried about side effects. She has also not been taking hydroxychloroquine for about 6 months. She did not notice any problems with this medication, but rather has just not been picking it up from the pharmacy. When she remembers, she likes doing stretching and swimming and feels this helps her joints. Planning to do yoga on campus.     She sees [facility] in [place], who recently noted a precancerous lesion on her scalp. She has a follow-up visit today. She has noticed a small area of redness in between the toes of her left fourth and fifth digit. She has had this before, and thinks it may be athlete's foot. She has been better about putting on sunscreen this ummer.    She does follow with a psychiatrist and a therapist for management of depression and anxiety. She is discussing changing some medications with her psychiatrist this fall. She is going to be scheduled for repeated neuropsychiatric testing due to concern for ADHD.     She denies any rashes, photosensitive skin changes/fatigue, hair loss, sores in her nose or mouth, or GI symptoms. She has not had any recent infections.     She had a Kyleena IUD placed in 2020. It helps with menstrual suppression. She does use marijuana regularly, and finds this helps with her pain and relaxation. She uses a THC pen usually. No tobacco use. No alcohol use.     She will be starting her senior year in [place] this year.      Healthcare Transitions:  Transition topics covered today included: Medication indication, Adult provider planning, Birth control, and Substance use    Review of 14 systems is negative other than noted above.     Problem List:     Patient Active Problem List    Diagnosis Date Noted    Proteinuria 01/10/2018     Priority: High    Depression, unspecified depression type 03/04/2022     Priority: Medium    Other systemic lupus erythematosus with other organ involvement (H) 07/31/2017     Priority: Medium     Anxiety 09/26/2016     Priority: Medium    Systemic lupus erythematosus (H) 09/10/2016     Priority: Medium     At diagnosis: Lorena is a 15 year old girl with history of chronic abdominal pain, diarrhea and weight loss,, lymphadenopathy, elevated inflammatory markers and recurrent fevers with new diagnosis of systemic lupus erythematosis (SLE) after admission and work-up performed at [facility]. Criteria consistent with SLE include arthritis, Leydi positive anemia, positive KAIA, and positive dsDNA. She also has positive anti-Ro and anti-RNP antibody but is anti-Smith negative. Her C3 and C4 are both low, with C4 being undetectable. Given one dose of IVMP 30 mg/kg, then oral prednisone 30 mg BID. Eventually tapered off. Mycophenolate and hydroxychloroquine ongoing. SSA and SSB repeats were negative.     Arthritis (now improved), positive dsDNA, hypocomplementemia, Leydi positive (now negative).     No nephritis, ECHO and PFTs within normal limits    [facility])    Brain MRI essentially normal but suboptimal evaluation due to braces. Anti-neuronal antibody and ribosomal P antibodies negative.     Primary rheumatologic diagnosis:systemic lupus erythematosus  Date of symptom onset:8/2015  Date of first visit to center:9/9/16  Date of diagnosis:8/31/16       Presenting clinical features included:  Abdominal pain, diarrhea, weight loss, lymphadenopathy elevated ESR     Lab evaluation at onset notable for:   dsDNA>1000, KAIA positive, SSA 71.1 (normal <20), SSB 5.5, Sm 12, U1-RNP/Sm 86.4, Scl-70 2.3, Amber-1 3.6, TSH normal, repeat CBC on 9/1/16 was normal, cryptosporidium/giardia negative, ova and parasites negative, Q fever negative, HIV negative, blood culture no growth, cryptococcus negative, CK 27, C3 54 (), C4 3.2, IgA 262.0, IgG 1760 (734-1570), IgM 57.4 (normal), haptoglobin normal, RF negative, beta-2-glycoprotein IgG/IgM negative,  "cardiolipin IgG 12 (normal up to 10), cardiolipin IgM <4, direct Leydi positive, TICO anti-C3 negative, DRVVT ration normal      Notable imaging or pathology:   GI who performed upper and lower endoscopies with biopsies. Biopsies showed only mild inflammation in the duodenum and colon. She also had an MR enterography on 8/16/16 which did not show bowel disease but did show mildly prominent adenopathy in several areas.      Treatment history:   IVMP 30 mg/kg once during hospitalization, then 30 mg prednisone BID, tapered over several months; mycophenolate, hydroxychloroquine      Chronic abdominal pain 09/10/2016     Priority: Medium        Medications:     Current Outpatient Medications   Medication Sig Dispense Refill    Cobalamine Combinations (B12 FOLATE PO)       diphenhydrAMINE (BENADRYL) 25 MG capsule Take by mouth as needed      escitalopram (LEXAPRO) 10 MG tablet Take 10 mg by mouth daily      escitalopram (LEXAPRO) 20 MG tablet TAKE 1 TABLET BY MOUTH DAILY with 10mg tab      hydroxychloroquine (PLAQUENIL) 200 MG tablet Take 1 tablet (200 mg) by mouth daily For systemic lupus erythematosus diaganosis 90 tablet 0    hydrOXYzine (ATARAX) 25 MG tablet Take by mouth as needed      levonorgestrel (KYLEENA) 19.5 MG IUD Kyleena 17.5 mcg/24 hrs (5yrs) 19.5mg intrauterine device   Take 1 device by intrauterine route.      nabumetone (RELAFEN) 500 MG tablet Take 2 tablets (1,000 mg) by mouth daily 180 tablet 2    traZODone (DESYREL) 50 MG tablet Take 25-50 mg by mouth nightly as needed for sleep      vitamin D3 (CHOLECALCIFEROL) 2000 units (50 mcg) tablet Take 2,000 Units by mouth daily      insulin syringe 31G X 5/16\" 1 ML MISC Use as directed to give methotrexate. (Patient not taking: Reported on 5/15/2024) 100 each 11      Allergies:     Allergies   Allergen Reactions    Aloe Rash    Cats Itching      Social History:     Social History     Social History Narrative    Lorena lives in [place] for [employer]. Her mother is " "in the [employer]-trans[place]. She is studying writing. She has a brother.      Physical Examination:   Blood pressure 116/77, pulse 81, temperature 98.7  F (37.1  C), temperature source Tympanic, height 1.589 m (5' 2.56\"), weight 78.4 kg (172 lb 13.5 oz), SpO2 97%.  Facility age limit for growth %misael is 20 years.  Growth %ile SmartLinks can only be used for patients less than 20 years old.  Body surface area is 1.86 meters squared.    Constitutional: awake, alert, cooperative, no apparent distress, and appears stated age.  Head: Normal head and hair pattern, no alopecia.  Eyes: Lids and lashes normal, pupils equal, round and reactive to light, extra ocular muscles intact, sclera clear, conjunctiva normal.  Ears: External ears without lesions, ear canals normal.   ENT: Oral pharynx with moist mucous membranes, tonsils without erythema or exudates, gums normal and good dentition, normal salivary pool.  Hematologic / Lymphatic: no cervical or supraclavicular lymphadenopathy.  Respiratory: No increased work of breathing, good air exchange, clear to auscultation bilaterally without crackles or wheezing.  Cardiovascular: Regular rate and rhythm, normal S1 and S2, no S3 or S4, and no murmur noted.  GI: Soft, non-distended.  Genitourinary: Deferred  Skin: no bruising or bleeding, normal skin color, texture, turgor, and no lesions. Very small area of mildly erythematous skin with some associated scale in the interdigital space of the right fourth and fifth toes.  Musculoskeletal: No evidence of current synovitis/arthritis of the cervical spine, TMJ, sternoclavicular, acromioclavicular, glenohumeral, elbow, wrists, finger, sacroiliac, hip, knee, ankle, or toe joints. No tendonitis or bursitis. No enthesitis.  No leg length discrepancy. Gait is normal with walking. Marked hypermobility of the fingers and wrist. Hypermobility also noted in elbows, hips, to a lesser extent knees. Muscular tension appreciated in the trapezial muscles " bilaterally along with slight decrease on ROM of the neck.  Neurologic: Awake, alert, oriented to name, place and time. Cranial nerves II-XII are grossly intact.  Motor, sensory and tone grossly intact.  Neuropsychiatric: General: normal, calm and normal eye contact.    Assessment:   Lorena is a 23 year old female with systemic lupus erythematosus who presents today for follow-up and to initiate discussions of transition of care to adult rheumatology. Today Lorena is reporting joint pain in her hips and hands that seems consistent with a mechanical etiology as it is worst at the end of the day with limited morning symptoms and no sign of swelling. Reassuringly, I also do not appreciate any inflammatory arthritis on physical exam today. She does have significant hypermobility, which is likely contributing to her pain. We discussed pursuing PT for her hand and hip pain as well as for her neck tightness. She is open to this. I also recommended OT referral to discuss any tools that may help her with writing when she returns to school. Lorena will plan to find a center in [place] for this as well. Given the lack of synovitis today I did not recommend the use of methotrexate at this time. She has not been taking her Plaquenil for the last six months, and I did recommend she restart this. This medication is recommended in patients who have a diagnosis of lupus essentially indefinitely particularly if there are no issues with use such as retinal toxicity or other side effects. Her dose is on the lower side for her weight, but since she does not have clear signs concerning for SLE flare despite six months of not taking hydroxychloroquine, I will hold off on recommending a dose increase at this time. Lorena is overdue for a dilated eye exam with visual field testing, which she will plan to schedule in the next few months.     Labs today to monitor for disease activity are normal outside of a mildly elevated but stable dsDNA at 52 and  a mildly increased ESR at 34. The ESR elevation is non-specific, and is not overly concerning in isolation, but I will attend to this on follow-up. Additionally at our next visit we will aim to get a first morning appointment and check a fasting lipid panel, which has not yet been performed. We discussed that patients with lupus have an increased risk of CVD and that managing other modifable risk factors will be important for her. We discussed diet recommendations such as an emphasis on fresh fruits and vegetables and whole grains with limitations in processed foods and sugars.     We discussed recommended vaccines for Lorena given her age and diagnosis of lupus which include Shingrix and Pneumovax. She will connect with primary care in [place] to have these given.     I would like to see Lorena in January, or sooner if needed. She will plan to see me either here at [facility] or at my adult clinic in [place] with [facility].     Plan:   Laboratory testing every 4-6 months, to monitor medications and disease activity.    No planned labs prior to next visit.  No imaging is needed today.   Referrals to PT and OT made today  Medications: As listed. Changes made today: Restart hydroxychloroquine.  I recommend annual eye exams while on hydroxychlorquine (Plaquenil).  Return visit: ~5 months. Call sooner with any concerns.     If there are any new questions or concerns, I would be glad to help and can be reached through our main office at [phone] or our paging  at [phone].     Alison M. Lerman, MD         Addendum:  Laboratory Investigations:   Laboratory investigations performed today for which results were available at the time of this note are listed below. Pending labs will be reported in a separate letter.  Office Visit on 08/15/2024   Component Date Value Ref Range Status    Sodium 08/15/2024 141  135 - 145 mmol/L Final    Potassium 08/15/2024 4.7  3.4 - 5.3 mmol/L Final    Carbon Dioxide (CO2)  08/15/2024 25  22 - 29 mmol/L Final    Anion Gap 08/15/2024 9  7 - 15 mmol/L Final    Urea Nitrogen 08/15/2024 10.0  6.0 - 20.0 mg/dL Final    Creatinine 08/15/2024 0.63  0.51 - 0.95 mg/dL Final    GFR Estimate 08/15/2024 >90  >60 mL/min/1.73m2 Final    eGFR calculated using 2021 CKD-EPI equation.    Calcium 08/15/2024 9.3  8.8 - 10.4 mg/dL Final    Reference intervals for this test were updated on 7/16/2024 to reflect our healthy population more accurately. There may be differences in the flagging of prior results with similar values performed with this method. Those prior results can be interpreted in the context of the updated reference intervals.    Chloride 08/15/2024 107  98 - 107 mmol/L Final    Glucose 08/15/2024 103 (H)  70 - 99 mg/dL Final    Alkaline Phosphatase 08/15/2024 72  40 - 150 U/L Final    AST 08/15/2024 30  0 - 45 U/L Final    ALT 08/15/2024 45  0 - 50 U/L Final    Protein Total 08/15/2024 7.3  6.4 - 8.3 g/dL Final    Albumin 08/15/2024 3.9  3.5 - 5.2 g/dL Final    Bilirubin Total 08/15/2024 <0.2  <=1.2 mg/dL Final    C4 Complement 08/15/2024 13  13 - 39 mg/dL Final    C3 Complement 08/15/2024 99  81 - 157 mg/dL Final    DNA (ds) Antibody 08/15/2024 52.0 (H)  <10.0 IU/mL Final    Positive    Erythrocyte Sedimentation Rate 08/15/2024 34 (H)  0 - 20 mm/hr Final    CRP Inflammation 08/15/2024 <3.00  <5.00 mg/L Final    Total Protein Urine mg/dL 08/15/2024 <6.0    mg/dL Final    The reference ranges have not been established in urine protein. The results should be integrated into the clinical context for interpretation.    Total Protein Urine mg/mg Creat 08/15/2024    Final    Unable to calculate, urine creatinine or protein is outside the detectable limits.    Creatinine Urine mg/dL 08/15/2024 75.6  mg/dL Final    The reference ranges have not been established in urine creatinine. The results should be integrated into the clinical context for interpretation.    Color Urine 08/15/2024 Yellow   Colorless, Straw, Light Yellow, Yellow Final    Appearance Urine 08/15/2024 Clear  Clear Final    Glucose Urine 08/15/2024 Negative  Negative mg/dL Final    Bilirubin Urine 08/15/2024 Negative  Negative Final    Ketones Urine 08/15/2024 Negative  Negative mg/dL Final    Specific Gravity Urine 08/15/2024 1.025  1.003 - 1.035 Final    Blood Urine 08/15/2024 Negative  Negative Final    pH Urine 08/15/2024 6.5  5.0 - 7.0 Final    Protein Albumin Urine 08/15/2024 Negative  Negative mg/dL Final    Urobilinogen Urine 08/15/2024 0.2  0.2, 1.0 mg/dL Final    Nitrite Urine 08/15/2024 Negative  Negative Final    Leukocyte Esterase Urine 08/15/2024 Negative  Negative Final    Bacteria Urine 08/15/2024 Few (A)  None Seen /HPF Final    Mucus Urine 08/15/2024 Present (A)  None Seen /LPF Final    RBC Urine 08/15/2024 1  <=2 /HPF Final    WBC Urine 08/15/2024 1  <=5 /HPF Final    Squamous Epithelials Urine 08/15/2024 1  <=1 /HPF Final    WBC Count 08/15/2024 5.4  4.0 - 11.0 10e3/uL Final    RBC Count 08/15/2024 4.34  3.80 - 5.20 10e6/uL Final    Hemoglobin 08/15/2024 13.5  11.7 - 15.7 g/dL Final    Hematocrit 08/15/2024 39.5  35.0 - 47.0 % Final    MCV 08/15/2024 91  78 - 100 fL Final    MCH 08/15/2024 31.1  26.5 - 33.0 pg Final    MCHC 08/15/2024 34.2  31.5 - 36.5 g/dL Final    RDW 08/15/2024 12.4  10.0 - 15.0 % Final    Platelet Count 08/15/2024 220  150 - 450 10e3/uL Final    % Neutrophils 08/15/2024 56  % Final    % Lymphocytes 08/15/2024 36  % Final    % Monocytes 08/15/2024 7  % Final    % Eosinophils 08/15/2024 1  % Final    % Basophils 08/15/2024 0  % Final    % Immature Granulocytes 08/15/2024 0  % Final    NRBCs per 100 WBC 08/15/2024 0  <1 /100 Final    Absolute Neutrophils 08/15/2024 3.0  1.6 - 8.3 10e3/uL Final    Absolute Lymphocytes 08/15/2024 2.0  0.8 - 5.3 10e3/uL Final    Absolute Monocytes 08/15/2024 0.4  0.0 - 1.3 10e3/uL Final    Absolute Eosinophils 08/15/2024 0.0  0.0 - 0.7 10e3/uL Final    Absolute Basophils  08/15/2024 0.0  0.0 - 0.2 10e3/uL Final    Absolute Immature Granulocytes 08/15/2024 0.0  <=0.4 10e3/uL Final    Absolute NRBCs 08/15/2024 0.0  10e3/uL Final     Physician Attestation   I, Hillary Caceres MD, saw this patient and agree with the findings and plan of care as documented in the note.      Items personally reviewed/procedural attestation: vitals and labs.    Review of external notes as documented elsewhere in note  Review of the result(s) of each unique test - labs  Ordering of each unique test  Prescription drug management          Hillary Caceres M.D.  Pediatric Rheumatology

## 2024-08-15 NOTE — LETTER
8/15/2024      RE: Lorena Fritz  [address]  [facility] [address]     Dear Colleague,    Thank you for the opportunity to participate in the care of your patient, Lorena Fritz, at the [facility] at [facility]. Please see a copy of my visit note below.     Rheumatologic History:    Primary rheumatologic diagnosis: systemic lupus erythematosus  Date of symptom onset: 8/2015  Date of first visit to center: 9/9/16  Date of diagnosis: 8/31/16       Presenting clinical features included:  At diagnosis: Lorena is a 15 year old girl with history of chronic abdominal pain, diarrhea and weight loss,, lymphadenopathy, elevated inflammatory markers and recurrent fevers with new diagnosis of systemic lupus erythematosis (SLE) after admission and work-up performed at [facility]. Criteria consistent with SLE include arthritis, Leydi positive anemia, positive KAIA, and positive dsDNA. She also has positive anti-Ro and anti-RNP antibody but is anti-Smith negative. Her C3 and C4 are both low, with C4 being undetectable. Given one dose of IVMP 30 mg/kg, then oral prednisone 30 mg BID. Eventually tapered off. Mycophenolate and hydroxychloroquine ongoing. SSA and SSB repeats were negative.    Presenting clinical features included:  Abdominal pain, diarrhea, weight loss, lymphadenopathy, elevated ESR     Lab evaluation at onset notable for:   dsDNA>1000, KAIA positive, SSA 71.1 (normal <20), SSB 5.5, Sm 12, U1-RNP/Sm 86.4, Scl-70 2.3, Amber-1 3.6, Anti-neuronal antibody and ribosomal P antibodies negative. TSH normal, repeat CBC on 9/1/16 was normal, cryptosporidium/giardia negative, ova and parasites negative, Q fever negative, HIV negative, blood culture no growth, cryptococcus negative, CK 27, C3 54 (), C4 3.2, IgA 262.0, IgG 1760 (734-1570), IgM 57.4 (normal), haptoglobin normal, RF negative,  beta-2-glycoprotein IgG/IgM negative, cardiolipin IgG 12 (normal up to 10), cardiolipin IgM <4, direct Leydi positive, TICO anti-C3 negative, DRVVT ratio normal.      Notable imaging or pathology:   GI performed upper and lower endoscopies; biopsies showed only mild inflammation in the duodenum and colon. She also had an MR enterography on 16 which did not show bowel disease but did show mildly prominent adenopathy in several areas.   Brain MRI essentially normal but some scattered T2 hyperintensities in 2016. Normal MRA 2016.      Treatment history:   IVMP 30 mg/kg once during hospitalization, then 30 mg prednisone BID, tapered over several months  Hydroxychloroquine: -2024  Mycophenolate: 2016-2024  Methotrexate: Never started    Health Maintenance:    Last Eye Examination: 2023, but no retinal exam for several years.  Last Pulmonary Screening: PFT /DLCO:2016.  Chest imaging:CXR 2017.   Last Cardiovascular Screening: ECHO:22, normal. EK2022, normal. Lipid panel (should be every 3-5 years): not yet done  Last Bone Health Screening: DEXA: not yet indicated.  Last Neuropsych Testing:3/14/22.  Last TB screen:  2016    Last influenza vaccination:2023  Last COVID vaccination:2022   Last pneumococcal vaccination: (which one, PCV23): not yet done  Last shingles vaccination (if over age 19): not yet done      Subjective:    Lorena is a 23 year old female who was seen in Pediatric Rheumatology clinic today for follow up of systemic lupus erythematosus (SLE). Lorena was last seen in our clinic on 5/15/2024 and returns today to meet with me to discuss transition of care to adult rheumatology. She was last seen by Dr. Will  of pediatric rheumatology in 2024.     Since Lorena s last visit, she has overall been doing ok. She has joint pain in her hips, knees, and hands that bothers her most at the end of the day. She will have some discomfort or stiffness during the first 15-30  minutes of the morning. No joints have been swollen recently, but she does feel that during the school year when she is writing a lot her hands do get swollen. She recalls it can be hard for her to write more than a page at a time, which can be problematic for her. She takes Relafen daily and feels this definitely helps her joint pain. She has not been taking methotrexate. She states she was worried about side effects. She has also not been taking hydroxychloroquine for about 6 months. She did not notice any problems with this medication, but rather has just not been picking it up from the pharmacy. When she remembers, she likes doing stretching and swimming and feels this helps her joints. Planning to do yoga on campus.     She sees [facility] in [place], who recently noted a precancerous lesion on her scalp. She has a follow-up visit today. She has noticed a small area of redness in between the toes of her left fourth and fifth digit. She has had this before, and thinks it may be athlete's foot. She has been better about putting on sunscreen this ummer.    She does follow with a psychiatrist and a therapist for management of depression and anxiety. She is discussing changing some medications with her psychiatrist this fall. She is going to be scheduled for repeated neuropsychiatric testing due to concern for ADHD.     She denies any rashes, photosensitive skin changes/fatigue, hair loss, sores in her nose or mouth, or GI symptoms. She has not had any recent infections.     She had a Kyleena IUD placed in 2020. It helps with menstrual suppression. She does use marijuana regularly, and finds this helps with her pain and relaxation. She uses a THC pen usually. No tobacco use. No alcohol use.     She will be starting her senior year in [place] this year.      Healthcare Transitions:  Transition topics covered today included: Medication indication, Adult provider planning, Birth control, and Substance  use    Review of 14 systems is negative other than noted above.     Problem List:     Patient Active Problem List    Diagnosis Date Noted     Proteinuria 01/10/2018     Priority: High     Depression, unspecified depression type 03/04/2022     Priority: Medium     Other systemic lupus erythematosus with other organ involvement (H) 07/31/2017     Priority: Medium     Anxiety 09/26/2016     Priority: Medium     Systemic lupus erythematosus (H) 09/10/2016     Priority: Medium     At diagnosis: Lorena is a 15 year old girl with history of chronic abdominal pain, diarrhea and weight loss,, lymphadenopathy, elevated inflammatory markers and recurrent fevers with new diagnosis of systemic lupus erythematosis (SLE) after admission and work-up performed at [facility]. Criteria consistent with SLE include arthritis, Leydi positive anemia, positive KAIA, and positive dsDNA. She also has positive anti-Ro and anti-RNP antibody but is anti-Smith negative. Her C3 and C4 are both low, with C4 being undetectable. Given one dose of IVMP 30 mg/kg, then oral prednisone 30 mg BID. Eventually tapered off. Mycophenolate and hydroxychloroquine ongoing. SSA and SSB repeats were negative.     Arthritis (now improved), positive dsDNA, hypocomplementemia, Leydi positive (now negative).     No nephritis, ECHO and PFTs within normal limits    [facility])    Brain MRI essentially normal but suboptimal evaluation due to braces. Anti-neuronal antibody and ribosomal P antibodies negative.     Primary rheumatologic diagnosis:systemic lupus erythematosus  Date of symptom onset:8/2015  Date of first visit to center:9/9/16  Date of diagnosis:8/31/16       Presenting clinical features included:  Abdominal pain, diarrhea, weight loss, lymphadenopathy elevated ESR     Lab evaluation at onset notable for:   dsDNA>1000, KAIA positive, SSA 71.1 (normal <20), SSB 5.5, Sm 12,  U1-RNP/Sm 86.4, Scl-70 2.3, Amber-1 3.6, TSH normal, repeat CBC on 9/1/16 was normal, cryptosporidium/giardia negative, ova and parasites negative, Q fever negative, HIV negative, blood culture no growth, cryptococcus negative, CK 27, C3 54 (), C4 3.2, IgA 262.0, IgG 1760 (734-1570), IgM 57.4 (normal), haptoglobin normal, RF negative, beta-2-glycoprotein IgG/IgM negative, cardiolipin IgG 12 (normal up to 10), cardiolipin IgM <4, direct Leydi positive, TICO anti-C3 negative, DRVVT ration normal      Notable imaging or pathology:   GI who performed upper and lower endoscopies with biopsies. Biopsies showed only mild inflammation in the duodenum and colon. She also had an MR enterography on 8/16/16 which did not show bowel disease but did show mildly prominent adenopathy in several areas.      Treatment history:   IVMP 30 mg/kg once during hospitalization, then 30 mg prednisone BID, tapered over several months; mycophenolate, hydroxychloroquine       Chronic abdominal pain 09/10/2016     Priority: Medium        Medications:     Current Outpatient Medications   Medication Sig Dispense Refill     Cobalamine Combinations (B12 FOLATE PO)        diphenhydrAMINE (BENADRYL) 25 MG capsule Take by mouth as needed       escitalopram (LEXAPRO) 10 MG tablet Take 10 mg by mouth daily       escitalopram (LEXAPRO) 20 MG tablet TAKE 1 TABLET BY MOUTH DAILY with 10mg tab       hydroxychloroquine (PLAQUENIL) 200 MG tablet Take 1 tablet (200 mg) by mouth daily For systemic lupus erythematosus diaganosis 90 tablet 0     hydrOXYzine (ATARAX) 25 MG tablet Take by mouth as needed       levonorgestrel (KYLEENA) 19.5 MG IUD Kyleena 17.5 mcg/24 hrs (5yrs) 19.5mg intrauterine device   Take 1 device by intrauterine route.       nabumetone (RELAFEN) 500 MG tablet Take 2 tablets (1,000 mg) by mouth daily 180 tablet 2     traZODone (DESYREL) 50 MG tablet Take 25-50 mg by mouth nightly as needed for sleep       vitamin D3 (CHOLECALCIFEROL) 2000  "units (50 mcg) tablet Take 2,000 Units by mouth daily       insulin syringe 31G X 5/16\" 1 ML MISC Use as directed to give methotrexate. (Patient not taking: Reported on 5/15/2024) 100 each 11      Allergies:     Allergies   Allergen Reactions     Aloe Rash     Cats Itching      Social History:     Social History     Social History Narrative    Lorena lives in [place] for [employer]. Her mother is in the [facility]. She is studying writing. She has a brother.      Physical Examination:   Blood pressure 116/77, pulse 81, temperature 98.7  F (37.1  C), temperature source Tympanic, height 1.589 m (5' 2.56\"), weight 78.4 kg (172 lb 13.5 oz), SpO2 97%.  Facility age limit for growth %misael is 20 years.  Growth %ile SmartLinks can only be used for patients less than 20 years old.  Body surface area is 1.86 meters squared.    Constitutional: awake, alert, cooperative, no apparent distress, and appears stated age.  Head: Normal head and hair pattern, no alopecia.  Eyes: Lids and lashes normal, pupils equal, round and reactive to light, extra ocular muscles intact, sclera clear, conjunctiva normal.  Ears: External ears without lesions, ear canals normal.   ENT: Oral pharynx with moist mucous membranes, tonsils without erythema or exudates, gums normal and good dentition, normal salivary pool.  Hematologic / Lymphatic: no cervical or supraclavicular lymphadenopathy.  Respiratory: No increased work of breathing, good air exchange, clear to auscultation bilaterally without crackles or wheezing.  Cardiovascular: Regular rate and rhythm, normal S1 and S2, no S3 or S4, and no murmur noted.  GI: Soft, non-distended.  Genitourinary: Deferred  Skin: no bruising or bleeding, normal skin color, texture, turgor, and no lesions. Very small area of mildly erythematous skin with some associated scale in the interdigital space of the right fourth and fifth toes.  Musculoskeletal: No evidence of current synovitis/arthritis of the cervical spine, " TMJ, sternoclavicular, acromioclavicular, glenohumeral, elbow, wrists, finger, sacroiliac, hip, knee, ankle, or toe joints. No tendonitis or bursitis. No enthesitis.  No leg length discrepancy. Gait is normal with walking. Marked hypermobility of the fingers and wrist. Hypermobility also noted in elbows, hips, to a lesser extent knees. Muscular tension appreciated in the trapezial muscles bilaterally along with slight decrease on ROM of the neck.  Neurologic: Awake, alert, oriented to name, place and time. Cranial nerves II-XII are grossly intact.  Motor, sensory and tone grossly intact.  Neuropsychiatric: General: normal, calm and normal eye contact.    Assessment:   Lorena is a 23 year old female with systemic lupus erythematosus who presents today for follow-up and to initiate discussions of transition of care to adult rheumatology. Today Lorena is reporting joint pain in her hips and hands that seems consistent with a mechanical etiology as it is worst at the end of the day with limited morning symptoms and no sign of swelling. Reassuringly, I also do not appreciate any inflammatory arthritis on physical exam today. She does have significant hypermobility, which is likely contributing to her pain. We discussed pursuing PT for her hand and hip pain as well as for her neck tightness. She is open to this. I also recommended OT referral to discuss any tools that may help her with writing when she returns to school. Lorena will plan to find a center in [place] for this as well. Given the lack of synovitis today I did not recommend the use of methotrexate at this time. She has not been taking her Plaquenil for the last six months, and I did recommend she restart this. This medication is recommended in patients who have a diagnosis of lupus essentially indefinitely particularly if there are no issues with use such as retinal toxicity or other side effects. Her dose is on the lower side for her weight, but since she does not  have clear signs concerning for SLE flare despite six months of not taking hydroxychloroquine, I will hold off on recommending a dose increase at this time. Lorena is overdue for a dilated eye exam with visual field testing, which she will plan to schedule in the next few months.     Labs today to monitor for disease activity are normal outside of a mildly elevated but stable dsDNA at 52 and a mildly increased ESR at 34. The ESR elevation is non-specific, and is not overly concerning in isolation, but I will attend to this on follow-up. Additionally at our next visit we will aim to get a first morning appointment and check a fasting lipid panel, which has not yet been performed. We discussed that patients with lupus have an increased risk of CVD and that managing other modifable risk factors will be important for her. We discussed diet recommendations such as an emphasis on fresh fruits and vegetables and whole grains with limitations in processed foods and sugars.     We discussed recommended vaccines for Lorena given her age and diagnosis of lupus which include Shingrix and Pneumovax. She will connect with primary care in [place] to have these given.     I would like to see Lorena in January, or sooner if needed. She will plan to see me either here at [facility] or at my adult clinic in [place] with [facility].     Plan:   Laboratory testing every 4-6 months, to monitor medications and disease activity.    No planned labs prior to next visit.  No imaging is needed today.   Referrals to PT and OT made today  Medications: As listed. Changes made today: Restart hydroxychloroquine.  I recommend annual eye exams while on hydroxychlorquine (Plaquenil).  Return visit: ~5 months. Call sooner with any concerns.     If there are any new questions or concerns, I would be glad to help and can be reached through our main office at [phone] or our paging  at [phone].     Alison M. Lerman, MD         Addendum:   Laboratory Investigations:   Laboratory investigations performed today for which results were available at the time of this note are listed below. Pending labs will be reported in a separate letter.  Office Visit on 08/15/2024   Component Date Value Ref Range Status     Sodium 08/15/2024 141  135 - 145 mmol/L Final     Potassium 08/15/2024 4.7  3.4 - 5.3 mmol/L Final     Carbon Dioxide (CO2) 08/15/2024 25  22 - 29 mmol/L Final     Anion Gap 08/15/2024 9  7 - 15 mmol/L Final     Urea Nitrogen 08/15/2024 10.0  6.0 - 20.0 mg/dL Final     Creatinine 08/15/2024 0.63  0.51 - 0.95 mg/dL Final     GFR Estimate 08/15/2024 >90  >60 mL/min/1.73m2 Final    eGFR calculated using 2021 CKD-EPI equation.     Calcium 08/15/2024 9.3  8.8 - 10.4 mg/dL Final    Reference intervals for this test were updated on 7/16/2024 to reflect our healthy population more accurately. There may be differences in the flagging of prior results with similar values performed with this method. Those prior results can be interpreted in the context of the updated reference intervals.     Chloride 08/15/2024 107  98 - 107 mmol/L Final     Glucose 08/15/2024 103 (H)  70 - 99 mg/dL Final     Alkaline Phosphatase 08/15/2024 72  40 - 150 U/L Final     AST 08/15/2024 30  0 - 45 U/L Final     ALT 08/15/2024 45  0 - 50 U/L Final     Protein Total 08/15/2024 7.3  6.4 - 8.3 g/dL Final     Albumin 08/15/2024 3.9  3.5 - 5.2 g/dL Final     Bilirubin Total 08/15/2024 <0.2  <=1.2 mg/dL Final     C4 Complement 08/15/2024 13  13 - 39 mg/dL Final     C3 Complement 08/15/2024 99  81 - 157 mg/dL Final     DNA (ds) Antibody 08/15/2024 52.0 (H)  <10.0 IU/mL Final    Positive     Erythrocyte Sedimentation Rate 08/15/2024 34 (H)  0 - 20 mm/hr Final     CRP Inflammation 08/15/2024 <3.00  <5.00 mg/L Final     Total Protein Urine mg/dL 08/15/2024 <6.0    mg/dL Final    The reference ranges have not been established in urine protein. The results should be integrated into the clinical  context for interpretation.     Total Protein Urine mg/mg Creat 08/15/2024    Final    Unable to calculate, urine creatinine or protein is outside the detectable limits.     Creatinine Urine mg/dL 08/15/2024 75.6  mg/dL Final    The reference ranges have not been established in urine creatinine. The results should be integrated into the clinical context for interpretation.     Color Urine 08/15/2024 Yellow  Colorless, Straw, Light Yellow, Yellow Final     Appearance Urine 08/15/2024 Clear  Clear Final     Glucose Urine 08/15/2024 Negative  Negative mg/dL Final     Bilirubin Urine 08/15/2024 Negative  Negative Final     Ketones Urine 08/15/2024 Negative  Negative mg/dL Final     Specific Gravity Urine 08/15/2024 1.025  1.003 - 1.035 Final     Blood Urine 08/15/2024 Negative  Negative Final     pH Urine 08/15/2024 6.5  5.0 - 7.0 Final     Protein Albumin Urine 08/15/2024 Negative  Negative mg/dL Final     Urobilinogen Urine 08/15/2024 0.2  0.2, 1.0 mg/dL Final     Nitrite Urine 08/15/2024 Negative  Negative Final     Leukocyte Esterase Urine 08/15/2024 Negative  Negative Final     Bacteria Urine 08/15/2024 Few (A)  None Seen /HPF Final     Mucus Urine 08/15/2024 Present (A)  None Seen /LPF Final     RBC Urine 08/15/2024 1  <=2 /HPF Final     WBC Urine 08/15/2024 1  <=5 /HPF Final     Squamous Epithelials Urine 08/15/2024 1  <=1 /HPF Final     WBC Count 08/15/2024 5.4  4.0 - 11.0 10e3/uL Final     RBC Count 08/15/2024 4.34  3.80 - 5.20 10e6/uL Final     Hemoglobin 08/15/2024 13.5  11.7 - 15.7 g/dL Final     Hematocrit 08/15/2024 39.5  35.0 - 47.0 % Final     MCV 08/15/2024 91  78 - 100 fL Final     MCH 08/15/2024 31.1  26.5 - 33.0 pg Final     MCHC 08/15/2024 34.2  31.5 - 36.5 g/dL Final     RDW 08/15/2024 12.4  10.0 - 15.0 % Final     Platelet Count 08/15/2024 220  150 - 450 10e3/uL Final     % Neutrophils 08/15/2024 56  % Final     % Lymphocytes 08/15/2024 36  % Final     % Monocytes 08/15/2024 7  % Final     %  Eosinophils 08/15/2024 1  % Final     % Basophils 08/15/2024 0  % Final     % Immature Granulocytes 08/15/2024 0  % Final     NRBCs per 100 WBC 08/15/2024 0  <1 /100 Final     Absolute Neutrophils 08/15/2024 3.0  1.6 - 8.3 10e3/uL Final     Absolute Lymphocytes 08/15/2024 2.0  0.8 - 5.3 10e3/uL Final     Absolute Monocytes 08/15/2024 0.4  0.0 - 1.3 10e3/uL Final     Absolute Eosinophils 08/15/2024 0.0  0.0 - 0.7 10e3/uL Final     Absolute Basophils 08/15/2024 0.0  0.0 - 0.2 10e3/uL Final     Absolute Immature Granulocytes 08/15/2024 0.0  <=0.4 10e3/uL Final     Absolute NRBCs 08/15/2024 0.0  10e3/uL Final     Physician Attestation  I, Hillary Caceres MD, saw this patient and agree with the findings and plan of care as documented in the note.      Items personally reviewed/procedural attestation: vitals and labs.    Review of external notes as documented elsewhere in note  Review of the result(s) of each unique test - labs  Ordering of each unique test  Prescription drug management          Hillary Caceres M.D.  Pediatric Rheumatology            Please do not hesitate to contact me if you have any questions/concerns.     Sincerely,       Alison M. Lerman, MD

## 2024-08-15 NOTE — PATIENT INSTRUCTIONS
Lorena Fritz saw Dr. Alison Lerman and Dr. Hillary Caceres on August 15, 2024 for an initial evaluation/follow up visit.    Recommendations:  Labs obtained today; if lab results are abnormal or require a change in the plan of care, we will contact you. If you do not hear from us, all the labs are reassuring.   Medications: Please restart Plaquenil and take this daily! This should help with symptoms like fatigue and joint pain. It takes months to start working, but please take it regularly. Ok to continue Relafen.   Ophthalmology visit needed to screen for retinal toxicity while on Plaquenil.   Referral to PT for neck tightness and pain.   Referral to OT for hand pain.    Please connect with a primary care provider to get your pneumococcal vaccine and your shingles vaccine series.   My schedule for health partners should open for scheduling in November.     Results: Lorena's lab and/or imaging results (if performed) will be mailed to you and your doctor in a formal letter summarizing this visit.  Any pending results at the time of the original note will be sent in a separate letter or relayed by phone.      Outside lab results: If you have labs done at an outside clinic as part of your follow up, please have the results faxed to us at [phone].    Thank you for allowing me to participate in [facility] care.  If there are any questions or concerns, please do not hesitate to contact us at the phone numbers below.    Pediatric Rheumatology Contact Information  [phone] - Main office/Nurse line: for medical questions about symptoms and medications, scheduling needs, refills, records requests  [phone] - Paging : for urgent after-hours needs     Infusion Information:  If we are using an infusion medication, please call our [facility] at [phone] to schedule your infusion. When you call,  you are able to make several appointments if that works for you. By scheduling your appointments, this  will allow our team to secure financing on your infusion. In the event that your infusion is not approved, someone from our team will notify you 1-2 days prior to the scheduled infusion to postpone your infusion appointment until secured/approved.    Alison M. Lerman, MD  Adult and Pediatric Rheumatology Fellow, PGY8     For Patient Education Materials:  [email or web address]            [facility] Pediatric Rheumatology    For Help:  The Pediatric Call Center at [phone] can help with scheduling of routine follow up visits.  Yana Huerta and Alcira Roe are the Nurse Coordinators for the Division of Pediatric Rheumatology and can be reached by phone at [phone] or through [employer] [email or web address]). They can help with questions about your child s rheumatic condition, medications, and test results.  For emergencies after hours or on the weekends, please call the page  at [phone] and ask to speak to the physician on-call for Pediatric Rheumatology. Please do not use WEbook for urgent requests.  Main  Services:  [phone]  Hmong/Romanian/Keegan: [phone]  Slovenian: [phone]  Vietnamese: [phone]    Internal Referrals: If we refer your child to another physician/team within [facility], you should receive a call to set this up. If you do not hear anything within a week, please call the Call Center at [phone].    External Referrals: If we refer your child to a physician/team outside of [facility], our team will send the referral order and relevant records to them. We ask that you call the place where your child is being referred to ensure they received the needed information and notify our team coordinators if not.    Imaging: If your child needs an imaging study that is not being performed the day of your clinic appointment, please call to set this up. For xrays, ultrasounds, and echocardiogram call [phone]. For CT or MRI call  [phone].     [employer]: We encourage you to sign up for [employer] at [email or web address]. For assistance or questions, call [phone]. If your child is 12 years or older, a consent for proxy/parent access needs to be signed so please discuss this with your physician at the next visit.

## 2024-08-16 LAB
C3 SERPL-MCNC: 99 MG/DL (ref 81–157)
C4 SERPL-MCNC: 13 MG/DL (ref 13–39)

## 2024-08-18 LAB — DSDNA AB SER-ACNC: 52 IU/ML

## 2024-10-25 ENCOUNTER — OFFICE VISIT (OUTPATIENT)
Dept: NEUROLOGY | Facility: CLINIC | Age: 24
End: 2024-10-25
Attending: INTERNAL MEDICINE
Payer: COMMERCIAL

## 2024-10-25 VITALS
OXYGEN SATURATION: 94 % | TEMPERATURE: 98.2 F | HEART RATE: 89 BPM | SYSTOLIC BLOOD PRESSURE: 103 MMHG | BODY MASS INDEX: 31.12 KG/M2 | DIASTOLIC BLOOD PRESSURE: 74 MMHG | WEIGHT: 173.2 LBS

## 2024-10-25 DIAGNOSIS — M32.9 SYSTEMIC LUPUS ERYTHEMATOSUS, UNSPECIFIED SLE TYPE, UNSPECIFIED ORGAN INVOLVEMENT STATUS (H): ICD-10-CM

## 2024-10-25 ASSESSMENT — PAIN SCALES - GENERAL: PAINLEVEL_OUTOF10: NO PAIN (0)

## 2024-10-25 NOTE — PROGRESS NOTES
Chief Complaint: memory problems    History of Present Illness:  Ms. Fritz is a 24 year old right-handed female with history of SLE, anxiety, depression, presenting for evaluation of memory problems. She is here by herself today.     Ms. Fritz reports that she has a memory problem.  She thinks that her memory problem started after she was diagnosed with SLE at age 15.  She had a neuropsychological testing in 2021.  It was done [facility].  She feels that her memory has been getting worse in the past two years.      She reports that when she has a conversation with her friends, she pauses doesn't remember what she was just talking about.  She forgot that she had an appointment today.  She has an exempt to use her notes for exam.  She reads the book but she forgets what she read.      Anything she learned before she started high school, she still remember how and do them well.  But tasks she learned afterwards, it is hard for her to remember.      She takes B12 and Vit D supplements.      iADLs:  Finances: She has them on [employer].  She would not remember to pay her bills otherwise.  Driving: She gets lost all the time. She uses mPortal to get to places.  Shopping: Independent.  She makes a list, otherwise she will not remember why she went shopping.    Meal preparation: She can cook for herself.    Medications:  If she keeps her morning routine, she will take her medication correctly.  If she does not follow her routine, she may forget to take her medications.    Medical appointments: She forgets to schedule them.  She has to put them on her calendar, otherwise she will forget.  She does not remember calling for this appointment.      Mood:  She has depression and anxiety.  She sees a psychiatrist and therapist.  She has been very anxious in the past month because of school.      Sleep: She takes trazodone to help with her sleep.  She forgets to take it because it's not part of her morning routine.  She  "has difficulty falling asleep.  She has difficulty staying asleep as well if she has a SLE flare up.      SLE was diagnosed when she was 15 years old.  Her PCP note from 11/15/2023 \"Criteria consistent with SLE include arthritis, Leydi positive anemia, positive KAIA, and positive dsDNA. She also has positive anti-Ro and anti-RNP antibody but is anti-Smith negative. Her C3 and C4 are both low, with C4 being undetectable. Given one dose of IVMP 30 mg/kg, then oral prednisone 30 mg BID. Eventually tapered off. Mycophenolate and hydroxychloroquine ongoing. SSA and SSB repeats were negative.\" \"Brain MRI essentially normal but suboptimal evaluation due to braces. Anti-neuronal antibody and ribosomal P antibodies negative. \"      Patient Active Problem List   Diagnosis    Systemic lupus erythematosus (H)    Chronic abdominal pain    Anxiety    Other systemic lupus erythematosus with other organ involvement (H)    Proteinuria    Depression, unspecified depression type       Past Medical History:   Diagnosis Date    Lupus (systemic lupus erythematosus) (H)        Past Surgical History:   Procedure Laterality Date    ENDOSCOPY UPPER, COLONOSCOPY, COMBINED         Current Outpatient Medications   Medication Sig Dispense Refill    Cobalamine Combinations (B12 FOLATE PO)       diphenhydrAMINE (BENADRYL) 25 MG capsule Take by mouth as needed      escitalopram (LEXAPRO) 10 MG tablet Take 10 mg by mouth daily      escitalopram (LEXAPRO) 20 MG tablet TAKE 1 TABLET BY MOUTH DAILY with 10mg tab      hydroxychloroquine (PLAQUENIL) 200 MG tablet Take 1 tablet (200 mg) by mouth daily For systemic lupus erythematosus diaganosis 90 tablet 0    hydrOXYzine (ATARAX) 25 MG tablet Take by mouth as needed      levonorgestrel (KYLEENA) 19.5 MG IUD Kyleena 17.5 mcg/24 hrs (5yrs) 19.5mg intrauterine device   Take 1 device by intrauterine route.      nabumetone (RELAFEN) 500 MG tablet Take 2 tablets (1,000 mg) by mouth daily 180 tablet 2    " "traZODone (DESYREL) 50 MG tablet Take 25-50 mg by mouth nightly as needed for sleep      vitamin D3 (CHOLECALCIFEROL) 2000 units (50 mcg) tablet Take 2,000 Units by mouth daily      insulin syringe 31G X 5/16\" 1 ML MISC Use as directed to give methotrexate. (Patient not taking: Reported on 5/15/2024) 100 each 11        Allergies   Allergen Reactions    Aloe Rash    Cats Itching       Family History   Problem Relation Age of Onset    Suicide Paternal Grandfather         Suicide in family members on both sides of family (great-uncle, mother's cousin, etc)    Osteoarthritis Paternal Grandmother     Ulcerative Colitis Other         Paternal great uncle and paternal great grandfather    Kidney Disease Other         Mom's side of the family. No one with kidney transplant, dialysis    Bone Spurs Maternal Grandmother     Macular Degeneration Maternal Grandfather     Other - See Comments Mother         PCOS, endometriosis    Arthritis Mother          Social History     Socioeconomic History    Marital status: Single     Spouse name: Not on file    Number of children: Not on file    Years of education: Not on file    Highest education level: Not on file   Occupational History    Not on file   Tobacco Use    Smoking status: Some Days     Passive exposure: Current    Smokeless tobacco: Never    Tobacco comments:     Patient smoke marijuana    Substance and Sexual Activity    Alcohol use: Not on file    Drug use: Yes     Types: Marijuana    Sexual activity: Not on file   Other Topics Concern    Not on file   Social History Narrative    Lorena lives in [place] for college. Her mother is in the [employer]. She is studying writing. She has a brother.     Social Drivers of Health     Financial Resource Strain: Not on file   Food Insecurity: Not on file   Transportation Needs: Not on file   Physical Activity: Not on file   Stress: Not on file   Social Connections: Not on file   Interpersonal Safety: Not on file   Housing Stability: Not " on file     SHX: She is a senior at [employer].  She studying English and Writing. She is on the [employer]'s list and has a straight A.  She does not drink.    FHX: Grandfather had AD. +depression, suicide, substance abuse (alcohol)    General Physical examination:  /74   Pulse 89   Temp 98.2  F (36.8  C) (Temporal)   Wt 78.6 kg (173 lb 3.2 oz)   SpO2 94%   BMI 31.12 kg/m       General: Ms. Fritz is well appearing and is appropriately groomed and dressed.    Neurological examination:    Mental status: Ms. Fritz  is awake, alert, and appropriate, with fluent speech, no word finding difficulties and no difficulty in answering questions.  Cranial nerves:  Visual fields are full to confrontation with no visual extinction. Extraocular movements are intact. Smooth pursuit is intact. Saccades are normal in initiation, velocity and amplitude both vertically and horizontally.  Facial strength is full bilaterally.  Sternocledomastoid and trapezius muscle strength is full bilaterally.  Motor examination: Bulk is normal throughout. Axial and upper and lower extremity tone is normal. No pronator drift is noted. Strength is 5/5 and symmetric throughout. There is no tremor or other involuntary movement.  Coordination: Finger-nose-finger and heel-knee-shin testing is normal with no dysmetria bilaterally.  Rapid finger tapping, opening and closing of fist and pronation-supination are not slowed.   Gait: She has an upright and steady stance with normal stride length and arm swing. Tandem walking is intact. No Romberg's sign is present.    SLUMS:  Day: Friday (1/1)  Year: 2024 (1/1)  State: [place] (1/1)  Calculation: 3/3  Naming animals: 18 (3/3)  Recall: 4/5  Digit span: 2/2  Clock: 4/4  Shape: 2/2  Story recall: 4/8  Total: 25/30    Neuropsychological evaluation:  A full report of the neuropsychological battery testing is available separately. Findings are summarized here briefly.  Dr. Jackson 8/19/202  SUMMARY AND IMPRESSIONS    Lorena is an 20-year-old young woman with lupus and anxiety who was referred for concerns of day-to-day forgetfulness and memory difficulties. These difficulties roughly coincided with her hospitalization and subsequent diagnosis for lupus and have gradually worsened over time. As a brief review, lupus is a disorder in which the body s immune system attacks its own tissues, impacting multiple organ systems, including the brain. Confusion (e.g.  brain fog ) and memory difficulties can occur in these individuals and is worsened by lupus-related fatigue and pain/discomfort.      In this context, neuropsychological testing revealed that compared to others young adults her age, Lorena s general intellectual abilities fall within the average range. Expressive verbal skills (vocabulary) were above average and a strength. Lorena also displayed strengths in focused attention and quick visual processing. Learning, memory, attention, executive functioning (skills needed for cognitive, emotional, and behavioral control), and visuomotor skills were all also average for age.     Examination of Lorena s neuropsychological profile allows for comparison of her skills compared to each other (rather than comparison to same-aged peers). Her profile indicates some mild weaknesses in visual memory, visuomotor integration, and short-term memory for numbers when compared to her general intellectual abilities. In fact, Lorena s short-term memory for numbers (WAIS-IV Digit Span) was significantly lower than her ability to mentally solve math problems (WAIS-IV Arithmetic). That magnitude of discrepancy is rare and occurred in only 4.5% of the test standardization sample. Overall, test results suggest that Lorena has better short- and long-term memory for information that is more structured. As previously mentioned, her ability to temporarily hold information in mind and solve math problems was substantially higher than her ability to repeat and  re-order digit strings. Memory for stories was also stronger than memory for visual designs or recalling a list of learned words (and the story information Lorena remembered was consistent with her report that she would remember the plot of a story, but not character information). It is expected that the more Lorena can identify relationships between [id number] items (i.e. making the information meaningful, relating it to something she already knows and building upon the old information, etc.), the better she will be able to remember them.      It is important to integrate test results with Lorena s academic, medical, and psychological history. Lorena s academic attainment suggests that she has been working very hard to perform at high level. That is, while her intellectual abilities are in the broad average range, her academic attainment (e.g., coming into college with credits earned in high school, making the [employer] s List at college) is in the above average range. It is understandable that Lorena might feel frustration with her experience of difficulties with memory. In addition, Lorena has a chronic illness that is associated with fatigue, physical discomfort, and  brain fog / lupus fog  that waxes and wanes, making it all-the-more frustrating that she can remember something at one time then not the next. Lorena also has a history of anxiety, which, especially along with pain and fatigue, is often associated with attentional lapses, which in turn can negatively affect learning and memory. It is possible that testing was performed on day that Lorena was experiencing minimal difficulties and that testing on a different day may have yielded different results. Our measures are simply not sensitive enough to capture such memory challenges in a bright, high functioning individual.      Research suggests individuals with lupus are at increased risk for neuropsychological differences related to attention, executive function, visual  memory, and visual-motor function, although not everyone with lupus will experience these difficulties. In Lorena s case, testing indicates that she has a mild relative weakness in rote verbal learning and memory, visual memory, and visuomotor integration. Because this is the first time Lorena has received neuropsychological testing, it is unclear whether Lorena s skills have declined over time, although self-report indicates that difficulties have worsened. It is important to note, however, that expectations placed on Lorena have likely increased over time as she advanced in high school and transitioned to college. These increased expectations and any associated anxiety, could also contribute to Lorena s experience of worsening forgetfulness and memory difficulties. Regardless, results from this evaluation are an important baseline for monitoring Lorena s neuropsychological functioning over time. We recommend re-evaluation in two years, or earlier if Lorena further experiences worsening of symptoms.         Diagnoses:   M32.19 Systemic lupus erythematosus, unspecified SLE type, unspecified organ involvement status    F41.9 Unspecified anxiety disorder       Labs:  Lab Results   Component Value Date    WBC 5.4 08/15/2024    RBC 4.34 08/15/2024    HGB 13.5 08/15/2024    HCT 39.5 08/15/2024    MCV 91 08/15/2024    MCH 31.1 08/15/2024    MCHC 34.2 08/15/2024    RDW 12.4 08/15/2024     08/15/2024     08/15/2024    POTASSIUM 4.7 08/15/2024    CHLORIDE 107 08/15/2024    CO2 25 08/15/2024    ANIONGAP 9 08/15/2024     (H) 08/15/2024    BUN 10.0 08/15/2024    CR 0.63 08/15/2024    CEDRICK 9.3 08/15/2024    TSH 1.01 05/11/2023    T4 1.15 04/18/2019    B12 649 05/26/2022        Imaging:  Results for orders placed or performed during the hospital encounter of 08/17/23   XR Pelvis 1/2 Views    Narrative    EXAM: XR PELVIS 1/2 VIEWS  LOCATION: [facility]  DATE:  8/17/2023    INDICATION:  Systemic lupus erythematosus, unspecified SLE type, unspecified organ involvement status (H)  COMPARISON: None.      Impression    IMPRESSION: Both hips negative for fracture. Pelvis negative for fracture. Intrauterine device.           Impression:  Ms. Fritz is a 24 year old right-handed female with history of SLE, anxiety, depression, who presents with memory problems.  She possibly has MCI due to SLE and mood.  We discussed that she will have further workup with neuropsychological testing to determine if there is a decline from 2022, which she agreed.      Recommendations:    1. Neuropsychological testing  2. MRI brain  3. Exercise 30 minutes/day     Follow-up: Return in about 2 months to follow up the MRI.    I spent 60 minutes total today for this visit, which includes face-to-face with the patient, reviewing the chart (neuropsychological testing, MRI images, lab reports, clinical notes, medications), ordering diagnostic test, counseling, and documentation.       The longitudinal plan of care for MCI as documented were addressed during this visit. Due to the added complexity in care, I will continue to support Lorena in the subsequent management and with ongoing continuity of care.

## 2024-10-25 NOTE — LETTER
10/25/2024       RE: Lorena Fritz  [address] [place] [address]     Dear Colleague,    Thank you for referring your patient, Lorena Fritz, to the  [facility] at [facility]. Please see a copy of my visit note below.    Chief Complaint: memory problems    History of Present Illness:  Ms. Fritz is a 24 year old right-handed female with history of SLE, anxiety, depression, presenting for evaluation of memory problems. She is here by herself today.     Ms. Fritz reports that she has a memory problem.  She thinks that her memory problem started after she was diagnosed with SLE at age 15.  She had a neuropsychological testing in 2021.  It was done [facility].  She feels that her memory has been getting worse in the past two years.      She reports that when she has a conversation with her friends, she pauses doesn't remember what she was just talking about.  She forgot that she had an appointment today.  She has an exempt to use her notes for exam.  She reads the book but she forgets what she read.      Anything she learned before she started high school, she still remember how and do them well.  But tasks she learned afterwards, it is hard for her to remember.      She takes B12 and Vit D supplements.      iADLs:  Finances: She has them on autopay.  She would not remember to pay her bills otherwise.  Driving: She gets lost all the time. She uses [employer] to get to places.  Shopping: Independent.  She makes a list, otherwise she will not remember why she went shopping.    Meal preparation: She can cook for herself.    Medications:  If she keeps her morning routine, she will take her medication correctly.  If she does not follow her routine, she may forget to take her medications.    Medical appointments: She forgets to schedule them.  She has to put them on her calendar, otherwise she will forget.  She does not remember  "calling for this appointment.      Mood:  She has depression and anxiety.  She sees a psychiatrist and therapist.  She has been very anxious in the past month because of school.      Sleep: She takes trazodone to help with her sleep.  She forgets to take it because it's not part of her morning routine.  She has difficulty falling asleep.  She has difficulty staying asleep as well if she has a SLE flare up.      SLE was diagnosed when she was 15 years old.  Her PCP note from 11/15/2023 \"Criteria consistent with SLE include arthritis, Leydi positive anemia, positive KAIA, and positive dsDNA. She also has positive anti-Ro and anti-RNP antibody but is anti-Smith negative. Her C3 and C4 are both low, with C4 being undetectable. Given one dose of IVMP 30 mg/kg, then oral prednisone 30 mg BID. Eventually tapered off. Mycophenolate and hydroxychloroquine ongoing. SSA and SSB repeats were negative.\" \"Brain MRI essentially normal but suboptimal evaluation due to braces. Anti-neuronal antibody and ribosomal P antibodies negative. \"      Patient Active Problem List   Diagnosis     Systemic lupus erythematosus (H)     Chronic abdominal pain     Anxiety     Other systemic lupus erythematosus with other organ involvement (H)     Proteinuria     Depression, unspecified depression type       Past Medical History:   Diagnosis Date     Lupus (systemic lupus erythematosus) (H)        Past Surgical History:   Procedure Laterality Date     ENDOSCOPY UPPER, COLONOSCOPY, COMBINED         Current Outpatient Medications   Medication Sig Dispense Refill     Cobalamine Combinations (B12 FOLATE PO)        diphenhydrAMINE (BENADRYL) 25 MG capsule Take by mouth as needed       escitalopram (LEXAPRO) 10 MG tablet Take 10 mg by mouth daily       escitalopram (LEXAPRO) 20 MG tablet TAKE 1 TABLET BY MOUTH DAILY with 10mg tab       hydroxychloroquine (PLAQUENIL) 200 MG tablet Take 1 tablet (200 mg) by mouth daily For systemic lupus erythematosus " "diaganosis 90 tablet 0     hydrOXYzine (ATARAX) 25 MG tablet Take by mouth as needed       levonorgestrel (KYLEENA) 19.5 MG IUD Kyleena 17.5 mcg/24 hrs (5yrs) 19.5mg intrauterine device   Take 1 device by intrauterine route.       nabumetone (RELAFEN) 500 MG tablet Take 2 tablets (1,000 mg) by mouth daily 180 tablet 2     traZODone (DESYREL) 50 MG tablet Take 25-50 mg by mouth nightly as needed for sleep       vitamin D3 (CHOLECALCIFEROL) 2000 units (50 mcg) tablet Take 2,000 Units by mouth daily       insulin syringe 31G X 5/16\" 1 ML MISC Use as directed to give methotrexate. (Patient not taking: Reported on 5/15/2024) 100 each 11        Allergies   Allergen Reactions     Aloe Rash     Cats Itching       Family History   Problem Relation Age of Onset     Suicide Paternal Grandfather         Suicide in family members on both sides of family (great-uncle, mother's cousin, etc)     Osteoarthritis Paternal Grandmother      Ulcerative Colitis Other         Paternal great uncle and paternal great grandfather     Kidney Disease Other         Mom's side of the family. No one with kidney transplant, dialysis     Bone Spurs Maternal Grandmother      Macular Degeneration Maternal Grandfather      Other - See Comments Mother         PCOS, endometriosis     Arthritis Mother          Social History     Socioeconomic History     Marital status: Single     Spouse name: Not on file     Number of children: Not on file     Years of education: Not on file     Highest education level: Not on file   Occupational History     Not on file   Tobacco Use     Smoking status: Some Days     Passive exposure: Current     Smokeless tobacco: Never     Tobacco comments:     Patient smoke marijuana    Substance and Sexual Activity     Alcohol use: Not on file     Drug use: Yes     Types: Marijuana     Sexual activity: Not on file   Other Topics Concern     Not on file   Social History Narrative    Lorena lives in [place] for [employer]. Her mother is in " the [facility]. She is studying writing. She has a brother.     Social Drivers of Health     Financial Resource Strain: Not on file   Food Insecurity: Not on file   Transportation Needs: Not on file   Physical Activity: Not on file   Stress: Not on file   Social Connections: Not on file   Interpersonal Safety: Not on file   Housing Stability: Not on file     SHX: She is a senior at [employer].  She studying English and Writing. She is on the [employer] list and has a straight A.  She does not drink.    FHX: Grandfather had AD. +depression, suicide, substance abuse (alcohol)    General Physical examination:  /74   Pulse 89   Temp 98.2  F (36.8  C) (Temporal)   Wt 78.6 kg (173 lb 3.2 oz)   SpO2 94%   BMI 31.12 kg/m       General: Ms. Fritz is well appearing and is appropriately groomed and dressed.    Neurological examination:    Mental status: Ms. Fritz  is awake, alert, and appropriate, with fluent speech, no word finding difficulties and no difficulty in answering questions.  Cranial nerves:  Visual fields are full to confrontation with no visual extinction. Extraocular movements are intact. Smooth pursuit is intact. Saccades are normal in initiation, velocity and amplitude both vertically and horizontally.  Facial strength is full bilaterally.  Sternocledomastoid and trapezius muscle strength is full bilaterally.  Motor examination: Bulk is normal throughout. Axial and upper and lower extremity tone is normal. No pronator drift is noted. Strength is 5/5 and symmetric throughout. There is no tremor or other involuntary movement.  Coordination: Finger-nose-finger and heel-knee-shin testing is normal with no dysmetria bilaterally.  Rapid finger tapping, opening and closing of fist and pronation-supination are not slowed.   Gait: She has an upright and steady stance with normal stride length and arm swing. Tandem walking is intact. No Romberg's sign is present.    SLUMS:  Day: Friday (1/1)  Year: 2024  (1/1)  State: [place] (1/1)  Calculation: 3/3  Naming animals: 18 (3/3)  Recall: 4/5  Digit span: 2/2  Clock: 4/4  Shape: 2/2  Story recall: 4/8  Total: 25/30    Neuropsychological evaluation:  A full report of the neuropsychological battery testing is available separately. Findings are summarized here briefly.  Dr. Jackson 8/19/202  SUMMARY AND IMPRESSIONS   Lorena is an 20-year-old young woman with lupus and anxiety who was referred for concerns of day-to-day forgetfulness and memory difficulties. These difficulties roughly coincided with her hospitalization and subsequent diagnosis for lupus and have gradually worsened over time. As a brief review, lupus is a disorder in which the body s immune system attacks its own tissues, impacting multiple organ systems, including the brain. Confusion (e.g.  brain fog ) and memory difficulties can occur in these individuals and is worsened by lupus-related fatigue and pain/discomfort.      In this context, neuropsychological testing revealed that compared to others young adults her age, Lorena s general intellectual abilities fall within the average range. Expressive verbal skills (vocabulary) were above average and a strength. Lorena also displayed strengths in focused attention and quick visual processing. Learning, memory, attention, executive functioning (skills needed for cognitive, emotional, and behavioral control), and visuomotor skills were all also average for age.     Examination of Lorena s neuropsychological profile allows for comparison of her skills compared to each other (rather than comparison to same-aged peers). Her profile indicates some mild weaknesses in visual memory, visuomotor integration, and short-term memory for numbers when compared to her general intellectual abilities. In fact, Lorena s short-term memory for numbers (WAIS-IV Digit Span) was significantly lower than her ability to mentally solve math problems (WAIS-IV Arithmetic). That magnitude of discrepancy is  rare and occurred in only 4.5% of the test standardization sample. Overall, test results suggest that Lorena has better short- and long-term memory for information that is more structured. As previously mentioned, her ability to temporarily hold information in mind and solve math problems was substantially higher than her ability to repeat and re-order digit strings. Memory for stories was also stronger than memory for visual designs or recalling a list of learned words (and the story information Lorena remembered was consistent with her report that she would remember the plot of a story, but not character information). It is expected that the more Lorena can identify relationships between [id number] items (i.e. making the information meaningful, relating it to something she already knows and building upon the old information, etc.), the better she will be able to remember them.      It is important to integrate test results with Lorena s academic, medical, and psychological history. Lorena s academic attainment suggests that she has been working very hard to perform at high level. That is, while her intellectual abilities are in the broad average range, her academic attainment (e.g., coming into college with credits earned in high school, making the [employer] List at college) is in the above average range. It is understandable that Lorena might feel frustration with her experience of difficulties with memory. In addition, Lorena has a chronic illness that is associated with fatigue, physical discomfort, and  brain fog / lupus fog  that waxes and wanes, making it all-the-more frustrating that she can remember something at one time then not the next. Lorena also has a history of anxiety, which, especially along with pain and fatigue, is often associated with attentional lapses, which in turn can negatively affect learning and memory. It is possible that testing was performed on day that Lorena was experiencing minimal difficulties  and that testing on a different day may have yielded different results. Our measures are simply not sensitive enough to capture such memory challenges in a bright, high functioning individual.      Research suggests individuals with lupus are at increased risk for neuropsychological differences related to attention, executive function, visual memory, and visual-motor function, although not everyone with lupus will experience these difficulties. In Lorena zepeda case, testing indicates that she has a mild relative weakness in rote verbal learning and memory, visual memory, and visuomotor integration. Because this is the first time Lorena has received neuropsychological testing, it is unclear whether Lorena s skills have declined over time, although self-report indicates that difficulties have worsened. It is important to note, however, that expectations placed on Lorena have likely increased over time as she advanced in high school and transitioned to college. These increased expectations and any associated anxiety, could also contribute to Lorena s experience of worsening forgetfulness and memory difficulties. Regardless, results from this evaluation are an important baseline for monitoring Lorena s neuropsychological functioning over time. We recommend re-evaluation in two years, or earlier if Lorena further experiences worsening of symptoms.         Diagnoses:   M32.19 Systemic lupus erythematosus, unspecified SLE type, unspecified organ involvement status    F41.9 Unspecified anxiety disorder       Labs:  Lab Results   Component Value Date    WBC 5.4 08/15/2024    RBC 4.34 08/15/2024    HGB 13.5 08/15/2024    HCT 39.5 08/15/2024    MCV 91 08/15/2024    MCH 31.1 08/15/2024    MCHC 34.2 08/15/2024    RDW 12.4 08/15/2024     08/15/2024     08/15/2024    POTASSIUM 4.7 08/15/2024    CHLORIDE 107 08/15/2024    CO2 25 08/15/2024    ANIONGAP 9 08/15/2024     (H) 08/15/2024    BUN 10.0 08/15/2024    CR 0.63 08/15/2024     CEDRICK 9.3 08/15/2024    TSH 1.01 05/11/2023    T4 1.15 04/18/2019    B12 649 05/26/2022        Imaging:  Results for orders placed or performed during the hospital encounter of 08/17/23   XR Pelvis 1/2 Views    Narrative    EXAM: XR PELVIS 1/2 VIEWS  LOCATION: [facility]  DATE: 8/17/2023    INDICATION:  Systemic lupus erythematosus, unspecified SLE type, unspecified organ involvement status (H)  COMPARISON: None.      Impression    IMPRESSION: Both hips negative for fracture. Pelvis negative for fracture. Intrauterine device.           Impression:  Ms. Fritz is a 24 year old right-handed female with history of SLE, anxiety, depression, who presents with memory problems.  She possibly has MCI due to SLE and mood.  We discussed that she will have further workup with neuropsychological testing to determine if there is a decline from 2022, which she agreed.      Recommendations:    1. Neuropsychological testing  2. MRI brain  3. Exercise 30 minutes/day     Follow-up: Return in about 2 months to follow up the MRI.    I spent 60 minutes total today for this visit, which includes face-to-face with the patient, reviewing the chart (neuropsychological testing, MRI images, lab reports, clinical notes, medications), ordering diagnostic test, counseling, and documentation.       The longitudinal plan of care for MCI as documented were addressed during this visit. Due to the added complexity in care, I will continue to support Lorena in the subsequent management and with ongoing continuity of care.       Again, thank you for allowing me to participate in the care of your patient.      Sincerely,    Leesa Álvarez MD

## 2024-11-30 ENCOUNTER — HEALTH MAINTENANCE LETTER (OUTPATIENT)
Age: 24
End: 2024-11-30

## 2024-12-20 ENCOUNTER — ANCILLARY PROCEDURE (OUTPATIENT)
Dept: MRI IMAGING | Facility: CLINIC | Age: 24
End: 2024-12-20
Attending: PSYCHIATRY & NEUROLOGY
Payer: COMMERCIAL

## 2024-12-20 ENCOUNTER — TELEPHONE (OUTPATIENT)
Dept: RHEUMATOLOGY | Facility: CLINIC | Age: 24
End: 2024-12-20

## 2024-12-20 PROCEDURE — 70553 MRI BRAIN STEM W/O & W/DYE: CPT | Mod: TC | Performed by: RADIOLOGY

## 2024-12-20 PROCEDURE — A9585 GADOBUTROL INJECTION: HCPCS | Mod: JZ | Performed by: RADIOLOGY

## 2024-12-20 RX ORDER — GADOBUTROL 604.72 MG/ML
7.5 INJECTION INTRAVENOUS ONCE
Status: COMPLETED | OUTPATIENT
Start: 2024-12-20 | End: 2024-12-20

## 2024-12-20 RX ADMIN — GADOBUTROL 7.5 ML: 604.72 INJECTION INTRAVENOUS at 09:23

## 2024-12-20 NOTE — TELEPHONE ENCOUNTER
Patient is due for follow up in January with Dr. Ali Lerman.    I called patient but no answer. I could not leave a message, her voicemail was full. I sent a [employer] message to her notifying her that Dr. Lerman's clinic schedule is open and available for scheduling starting in January 2025. Requested that she call back to schedule. Since she is 24 years old. I also provided her the option of transitioning to adult Rheumatology clinic. Dr. Lerman will be seeing patients 18+ years old at [facility]. I provided the phone number for scheduling at  as well in case she prefers to be seen in the adult clinic.

## 2025-05-28 ENCOUNTER — OFFICE VISIT (OUTPATIENT)
Dept: NEUROLOGY | Facility: CLINIC | Age: 25
End: 2025-05-28
Payer: COMMERCIAL

## 2025-05-28 DIAGNOSIS — F41.9 ANXIETY: ICD-10-CM

## 2025-05-28 DIAGNOSIS — M32.9 SYSTEMIC LUPUS ERYTHEMATOSUS, UNSPECIFIED SLE TYPE, UNSPECIFIED ORGAN INVOLVEMENT STATUS (H): Primary | ICD-10-CM

## 2025-05-28 DIAGNOSIS — F06.8 OTHER SPECIFIED MENTAL DISORDERS DUE TO KNOWN PHYSIOLOGICAL CONDITION: ICD-10-CM

## 2025-05-28 DIAGNOSIS — F32.A DEPRESSION, UNSPECIFIED DEPRESSION TYPE: ICD-10-CM

## 2025-05-28 NOTE — PROGRESS NOTES
Patient was seen for neuropsychological evaluation at the request of Dr. Leesa Álvarez, for the purposes of diagnostic clarification and treatment planning.  2 hrs 24 min of test administration and scoring were provided by this writer, Ellyn Rutledge.  Please see Dr. Cahse Talavera's report for a full interpretation of the findings.

## 2025-05-28 NOTE — LETTER
2025       RE: Lorena Fritz  : 2000   MRN: [id number]      Dear Colleague,    Thank you for referring your patient, Lorena Fritz, to the [facility] at [facility]. Please see a copy of my visit note below.    Patient was seen for neuropsychological evaluation at the request of Dr. Leesa Álvarez, for the purposes of diagnostic clarification and treatment planning.  2 hrs 24 min of test administration and scoring were provided by this writer, Ellyn Rutledge.  Please see Dr. Chase Talavera's report for a full interpretation of the findings.      NEUROPSYCHOLOGICAL EVALUATION    NAME: Lorena Fritz  MRN: [id number]  : 2000  LYON: 2025    IDENTIFYING INFORMATION AND REASON FOR REFERRAL  Ms. Lorena Fritz is a 24-year-old, right-handed, , with 16 years of formal education. She was referred for a neuropsychological evaluation by Leesa Álvarez MD for evaluation of memory concerns. Information in this report comes from a clinical interview with Ms. Fritz as well as review of relevant medical records, including a prior neuropsychological evaluation.     RECORD REVIEW  Records indicate that Ms. Fritz was diagnosed with systemic lupus erythematosus at age 15. She completed a prior neuropsychological evaluation under the direction of Dr. Joyce Jackson on 2021. Results indicated that her general intellectual abilities that fell within the average range. She demonstrated mild weaknesses in visual memory, visuomotor integration, and short-term memory for numbers. Expressive verbal skills, focused attention, and quick visual processing were cognitive strengths. It was suspected that her history of lupus as well as anxiety may interfere with her day-to-day cognitive functioning. At a rheumatology appointment with Dr. Alison Lerman on 8/15/2024, Ms. Fritz reportedly requested a follow-up neuropsychological evaluation  for concerns related to attention-deficit/hyperactivity disorder (ADHD). Ms. Fritz established care with Dr. Leesa Álvarez in Neurology on 10/25/2024 for evaluation of her memory concerns. At that time, she described worsening memory difficulties over the past few years. A brief cognitive screening measure was slightly below expectation at that time (SLUMS = 25/30; missed points on recall tasks). An MRI of her brain on 12/20/2024 was read as unchanged, with  small foci of T2 FLAIR hyperintense signal in the cerebral white matter.      PRESENTING PROBLEM(S)  On clinical interview, Ms. Fritz confirmed the above history. She reported memory difficulties that began following her lupus diagnosis at age 15 and that have worsened over the last few years. She described difficulties remembering numbers, birthdays, and appointments. She reported that she relies heavily on her Google calendar to keep track of important events. She also noted having very few memories of her early childhood. She stated that she has trouble concentrating and is frequently distracted, jumping from one task to another. Additionally, she described being  spacey  during conversations with her friends. She reported that her psychotherapist has stated that she may have ADHD. Ms. Fritz added that her cognition fluctuates depending on her energy. She denied any personality or behavioral changes.    In the context of her history of lupus, she reported frequent numbness in her hands. She stated that she has always been a clumsy individual, but she denied any gait changes or recent falls. She reported that she is sensitive to loud noises and bright lights. She denied changes in her vision, hearing, or sense of taste or smell.     LIVING SITUATION AND FUNCTIONAL STATUS  Living Situation: She lives with several roommates.   Basic ADLS: She reported that she forgets to eat, shower, or brush her teeth if her routine is disrupted. However, she stated that  she is able to independently perform personal cares.   Household Tasks: She reported a recent difficulty with remembering to turn off the stove. She described more longstanding difficulty with remembering to turn off the oven. She denied other difficulties with performing household chores.  Finances: She reported that her bills are mostly on autopay. She added that she often procrastinates paying bills. She noted that she had to go to collections in the past because of a late bill.   Medications: When she follows a regular routine, she denied difficulties with forgetting to take her medications. She reportedly missed approximately half of her medications over the past 3 months because she was acutely ill with norovirus, irritable bowel syndrome, and COVID.   Driving: She received a speeding ticket within the last couple weeks. She denied any other recent difficulties with driving.    PSYCHIATRIC HISTORY  Ms. Fritz reported a history of depression and anxiety since her lupus diagnosis during her freshman year of high school. She described the context in which she was diagnosed with lupus as traumatic because she had to be evacuated from a camping trip to the hospital, just days before she began attending a new high school. She described feeling more depressed and anxious over the past 2 months while she was ill and not regularly taking her psychotropic medications. She endorsed feeling socially anxious and described a history of panic attacks. Her most recent panic attack occurred 2 weeks ago after she received a traffic ticket. She stated that her panic attacks are relatively infrequent. She reported that she has also been feeling anxious about the transition from college to  real life.  Ms. Fritz described a history of mildly disordered eating and acknowledged that the function of her behavior is to exercise control over her life. She denied a history of bulimia or anorexia. She meets with her psychiatrist  monthly. She reported benefit from her prescribed escitalopram, hydroxyzine, and lorazepam. She indicated rare use of hydroxyzine and lorazepam. She also meets every other week with her psychotherapist, with whom she has worked for over one year. She reported good rapport with her current psychotherapist. She denied any history of auditory/visual hallucinations, delusions, or abuse. She stated that she experiences passive suicidal ideation approximately 3 times per year. She denied any current or historical suicidal plans or intentions. She denied past suicide attempts.    SUBSTANCE USE HISTORY  Ms. Fritz denied current or historical use of alcohol, tobacco, or illicit substances. She smokes cannabis daily for pain management. She last smoked cannabis the night prior to the current evaluation. She denied a history of chemical dependency treatment.     HEALTH BEHAVIORS  Sleep: She sleeps 8 hours per night on average. She reported frequent difficulties with sleep initiation and maintenance. She added that she has been diagnosed with insomnia. She noted that her brain  won't shut off  at night. She reported that she takes trazodone to aid with sleep. She denied a history of snoring, sleep apnea, or dream enactment behaviors. She reported that she occasionally naps during the day.  Appetite/Weight: She stated that her lupus reduces her ability to detect hunger cues. She reported that her recent norovirus infection resulted in 20-lbs. weight loss in 1 week.   Exercise: She denied regular exercise.  Pain: She described a daily headache for the past year. She reported that she awoke with a headache on the morning of the current evaluation, but she noted that it had mostly resolved during the clinical interview. She also indicated arthritic pain in her hands and worsening pain in her knees and hips with colder weather.    ACTIVE PROBLEM LIST (PER EMR) AND MEDICAL HISTORY  Systemic lupus erythematosus (H)  Chronic  "abdominal pain  Anxiety  Other systemic lupus erythematosus with other organ involvement (H)  Proteinuria  Depression, unspecified depression type  Ms. Fritz denied a known history of stroke, seizure, or head injury with loss of consciousness.     RELEVANT FAMILY HISTORY   Ms. Fritz reported a family medical history of Alzheimer's disease (paternal grandfather), possible dyslexia (father), alcoholism (paternal family), depression (paternal family), anxiety (paternal family), and suspected bipolar disease (mother)    MEDICATION LIST (PER EMR)  Cobalamine Combinations (B12 FOLATE PO)  diphenhydrAMINE (BENADRYL) 25 MG capsule   escitalopram (LEXAPRO) 10 MG tablet   escitalopram (LEXAPRO) 20 MG tablet   hydroxychloroquine (PLAQUENIL) 200 MG tablet  hydrOXYzine (ATARAX) 25 MG tablet   insulin syringe 31G X \" 1 ML MISC)  levonorgestrel (KYLEENA) 19.5 MG IUD  nabumetone (RELAFEN) 500 MG tablet  traZODone (DESYREL) 50 MG tablet   vitamin D3 (CHOLECALCIFEROL) 2000 units (50 mcg) tablet    PSYCHOSOCIAL HISTORY  Born in [place], [place] and raised in [place], [place]  Birth/Development: Her  history was complicated by a nuchal cord.   Language: English  Educational History: She described herself as a straight-A student in school. She reported having difficulty in math in high school. She stated that she was able to focus on subjects that she found interesting. She described difficulty with paying attention during lectures if she was not taking notes. She reported being a fast reader and test taker. However, she stated that she experienced test anxiety. She reliably turned in her homework in school. She reported that she had a section 504 plan in high school and college. She completed 1 year of college at [employer] before transferring to the [employer]. She graduated magna cum laude in May of 2025 with her bachelor's in English and 3 minors in writing.    Occupational History: She is " currently working as a  at [employer] over the summer. This is her fourth year as . She has an unpaid internship through the [employer] that begins during the next few months as well. She previously nannied and worked in customer service.  Marital Status: Single  Children: None  Social Support: She reported a strong social support network including her mother and several good friends.    BEHAVIORAL OBSERVATIONS  Ms. Fritz arrived on time and was unaccompanied. She was casually dressed and appropriately groomed. Gait was unremarkable. No tremor or postural abnormalities were observed. Vision and hearing appeared adequate. She wore glasses throughout the evaluation. She was pleasant and cooperative. Speech was fluent and normal for rate, volume, and prosody. Comprehension appeared adequate. Thought processes were goal-directed and linear. She was a good personal historian and appeared to have good insight. Mood was euthymic with congruent affect. No juan pablo or responses to internal stimuli were observed. On testing, she appeared alert and attentive. Rapport was easily established and maintained. She demonstrated adequate frustration tolerance. She appeared to put forth good and consistent effort.      RESULTS AND INTERPRETATION  Performance Validity: Results of stand-alone and embedded measures for cognitive performance validity were within expectation. The test results are believed to accurately represent Ms. Fritz's current cognitive abilities.   Orientation: She was oriented to personal information and most aspects of place and time. She could not recall the name of the clinic. She misreported that date by 3 days but was otherwise oriented to time.   Premorbid Cognitive Ability: Ms. Fritz performed in the high average range on a task of single word reading.   Attention & Working Memory: Basic auditory attention and auditory working memory  were average. Mental calculations were also average.  Processing Speed: Her performance on a speeded visual scanning task was average. Her performances on speeded sequencing tasks with visual scanning and psychomotor demands were average. Her performance on a speeded visuomotor coding task was average, with no errors. Speeded word reading was below average, while speeded color naming was average.   Language: Confrontation naming of pictures was performed without error. Letter-based verbal fluency was low average, with 4 set-loss errors. Category-based verbal fluency was low average, with 1 set-loss error and 1 repetition. Verbal abstract reasoning was average.   Visual Perceptual & Constructional Skills: Visuospatial judgments of variably oriented lines were performed within expectation. Her copy of a complex figure was within expected limits, with no evidence of errors in the overall figural gestalt. Visual problem-solving with blocks was average.   Learning & Anterograde Memory: Learning of a word list over multiple learning trials was average, with a positive learning curve. Free recall of the word list after a short delay was below average, while cued recall after a short delay was average. Free recall of the word list after a long delay was low average, and cued recall after a long delay was average. Delayed recognition of list words was above average, with 1 false-positive error. Immediate memory for verbal stories was average, while delayed memory was low average. Delayed recognition of story details was average. Immediate recall of geometric shapes was average, while delayed recall was high average. Recognition of the shapes was performed without error.    Executive Functioning: She performed in the average range on a complex set-shifting task with visual scanning and psychomotor demands, with no errors. Her copy of a complex figure was well-organized. Inhibition of an overlearned verbal response was  average.   Motor Functioning: Motor speed was low average. Fine motor dexterity was below average in her dominant (right) hand and high average in her non-dominant hand.   Emotional & Behavioral Functioning: She reported moderate symptoms of depression and anxiety on self-report measures, consistent with her report on the clinical interview.     IMPRESSIONS  Ms. Fritz demonstrated normal range cognitive functioning across most assessed domains. There was mild variability and weaknesses suggestive of subcortical system inefficiencies. Subtle variability on testing is likely attributable to her fatigue and pain (associated with her history of lupus and arthritis). There may also be contributions from insomnia, mood and anxiety symptoms (she is reporting moderate symptoms of both depression and anxiety), and/or acute effects of cannabis. Compared to her prior evaluation in 2021, there was no meaningful pattern of decline within any cognitive domain. There were isolated declines on a contextualized verbal memory task and a speeded motor task. There were improvements on visual problem-solving, visual memory, and word list recognition tasks. The remainder of her scores were stable. On evaluation, Ms. Fritz demonstrated normal range cognitive functioning, in keeping with her broadly average range cognitive baseline. Verbal processing speed and verbal recall performances were mildly variable. Dominant (right) fine motor dexterity and speed was a relative weakness. Visual recall was a relative and normative strength.     RECOMMENDATIONS  Results and recommendations of this evaluation were shared with Ms. Fritz via telephone on 5/28/2025. Ms. Fritz is encouraged to speak with her medical providers regarding any of the referrals suggested below.     Ms. Fritz is encouraged to continue regular meetings with her mental health providers for management of her mood and anxiety symptoms.   She is also encouraged to  continue regular follow-up with her rheumatologist for management of her lupus and arthritis.   Given Ms. Fritz's reported sleep difficulties, the below sleep hygiene strategies from the National Sleep Foundation may be helpful:  Avoid stimulants such as caffeine too close to bedtime.  Exercise can promote good sleep. Vigorous exercise should be done in the morning or late afternoon. A relaxing exercise (e.g., light stretching, yoga, meditation, deep breathing) or other relaxing activity (e.g., listening to soft music, reading) can be done before bed to help initiate a restful night's sleep.  Establish a regular, relaxing bedtime routine. Try to avoid emotionally upsetting conversations and activities before trying to go to sleep. Try not to dwell on or bring your problems to bed.  Optimize your bedroom with a comfortable mattress, pillow, and bedding; use heavy curtains or an eye mask to prevent light from interrupting your sleep. Light smells, such as lavender, can induce a calmer state of mind and cultivate a positive space for sleep. The room should not be too hot, too cold, or too bright.   Avoid bright lights and electronics at least 30 minutes before bed.   Food can be disruptive right before sleep; stay away from large meals, spicy dishes, and chocolate close to bedtime.  Ensure adequate exposure to natural light during the day. Light exposure helps maintain a healthy sleep-wake cycle.  Associate your bed with sleep. Try to avoid using your bed to watch TV or read.  If you can't fall asleep, get out of bed and do another relaxing activity for a while.  Keep naps short and limited to the early afternoon.   For additional information, a simple set of guidelines can be found here: https://www.sleepfoundation.org/sleep-topics/sleep-hygiene  Ms. Fritz stated that she uses cannabis to aid with pain and sleep. Daily cannabis use may interfere with cognitive functioning, though the benefits may outweigh the  risks for her. Should her cognitive symptoms become more bothersome, she may consider reducing her cannabis use to optimize cognitive efficiency.   She is encouraged to engage in physical exercise, to the extent that she is able and in consultation with her medical providers. Physical activity can alleviate mood symptoms as well as provide benefits for overall brain health.   The following compensatory strategies may be helpful for Ms. Fritz:  Use external aids to increase structure in daily life. Use of compensatory strategies such as notes, lists, and a centralized calendar are supported. Tools such as smart phones with alarms and reminders are helpful in bringing structure to daily activities.  Keep a set daily schedule and routine for activities (e.g., keep a routine bedtime).  To accommodate fatigue, it may be helpful to take frequent mental breaks throughout the workday.   Identify the period of the day when you have the most energy and reserve this time for completing important or mentally taxing tasks.  Process information that needs to be retained both verbally (e.g., repeat it aloud) and visually (e.g., write it down).  Place visual cues in highly visible locations to remember important tasks/information.  Repeat and/or provide context to information that you need to remember (e.g., make up a story or mnemonic device).  You may find that you need to make a conscious effort to pay attention to conversations or when learning new information. When attention is not focused, it is difficult for the brain to learn new information. Thus, making a mindful effort to pay attention as you go through daily tasks will assist and facilitate memory recall later on.  Allot extra time to complete tasks, especially when tasks involve fine motor abilities.   The results of the current evaluation constitute an updated baseline of Ms. Fritz's cognitive and emotional functioning and further evaluation is currently not  clinically indicated. Should she experience cognitive changes in the future, she may be referred for a neuropsychological re-evaluation at that time.    Kayley Spann, Ph.D., L.P.  Postdoctoral Neuropsychology Fellow    Chase Talavera, Ph.D., L.P., ABPP  Board Certified in Clinical Neuropsychology   / Licensed Psychologist [id number]    All services provided by the postdoctoral fellow were supervised by this licensed psychologist. All billing noted here is for professional services provided by the psychologist and the psychometrist.      Activities included in this evaluation: CPT Code # of Units Time (min)   Psychiatric diagnostic interview 01929 1 --   Test evaluation services by professional; first hour 90236 1 75   Test evaluation services by professional; additional hour (+) [id number]    Test administration & scoring by technician; first 30 mins [id number] 1 144   Test administration & scoring by technician; additional 30 mins (+) [id number]    Diagnoses: M32.9, F41.9, F32.A, F06.8.      Again, thank you for allowing me to participate in the care of your patient.      Sincerely,    Chase Talavera, PhD LP

## 2025-06-02 NOTE — PROGRESS NOTES
NEUROPSYCHOLOGICAL EVALUATION    NAME: Lorena Fritz  MRN: [id number]  : 2000  LYON: 2025    IDENTIFYING INFORMATION AND REASON FOR REFERRAL  Ms. Lorena Fritz is a 24-year-old, right-handed, , with 16 years of formal education. She was referred for a neuropsychological evaluation by Leesa Álvarez MD for evaluation of memory concerns. Information in this report comes from a clinical interview with Ms. Fritz as well as review of relevant medical records, including a prior neuropsychological evaluation.     RECORD REVIEW  Records indicate that Ms. Fritz was diagnosed with systemic lupus erythematosus at age 15. She completed a prior neuropsychological evaluation under the direction of Dr. Joyce Jackson on 2021. Results indicated that her general intellectual abilities that fell within the average range. She demonstrated mild weaknesses in visual memory, visuomotor integration, and short-term memory for numbers. Expressive verbal skills, focused attention, and quick visual processing were cognitive strengths. It was suspected that her history of lupus as well as anxiety may interfere with her day-to-day cognitive functioning. At a rheumatology appointment with Dr. Alison Lerman on 8/15/2024, Ms. Fritz reportedly requested a follow-up neuropsychological evaluation for concerns related to attention-deficit/hyperactivity disorder (ADHD). Ms. Fritz established care with Dr. Leesa Álvarez in Neurology on 10/25/2024 for evaluation of her memory concerns. At that time, she described worsening memory difficulties over the past few years. A brief cognitive screening measure was slightly below expectation at that time (SLUMS = 25/30; missed points on recall tasks). An MRI of her brain on 2024 was read as unchanged, with  small foci of T2 FLAIR hyperintense signal in the cerebral white matter.      PRESENTING PROBLEM(S)  On clinical interview, Ms. Fritz confirmed the above history. She  reported memory difficulties that began following her lupus diagnosis at age 15 and that have worsened over the last few years. She described difficulties remembering numbers, birthdays, and appointments. She reported that she relies heavily on her Google calendar to keep track of important events. She also noted having very few memories of her early childhood. She stated that she has trouble concentrating and is frequently distracted, jumping from one task to another. Additionally, she described being  spacey  during conversations with her friends. She reported that her psychotherapist has stated that she may have ADHD. Ms. Fritz added that her cognition fluctuates depending on her energy. She denied any personality or behavioral changes.    In the context of her history of lupus, she reported frequent numbness in her hands. She stated that she has always been a clumsy individual, but she denied any gait changes or recent falls. She reported that she is sensitive to loud noises and bright lights. She denied changes in her vision, hearing, or sense of taste or smell.     LIVING SITUATION AND FUNCTIONAL STATUS  Living Situation: She lives with several roommates.   Basic ADLS: She reported that she forgets to eat, shower, or brush her teeth if her routine is disrupted. However, she stated that she is able to independently perform personal cares.   Household Tasks: She reported a recent difficulty with remembering to turn off the stove. She described more longstanding difficulty with remembering to turn off the oven. She denied other difficulties with performing household chores.  Finances: She reported that her bills are mostly on autopay. She added that she often procrastinates paying bills. She noted that she had to go to collections in the past because of a late bill.   Medications: When she follows a regular routine, she denied difficulties with forgetting to take her medications. She reportedly missed  approximately half of her medications over the past 3 months because she was acutely ill with norovirus, irritable bowel syndrome, and COVID.   Driving: She received a speeding ticket within the last couple weeks. She denied any other recent difficulties with driving.    PSYCHIATRIC HISTORY  Ms. Fritz reported a history of depression and anxiety since her lupus diagnosis during her freshman year of high school. She described the context in which she was diagnosed with lupus as traumatic because she had to be evacuated from a camping trip to the hospital, just days before she began attending a new high school. She described feeling more depressed and anxious over the past 2 months while she was ill and not regularly taking her psychotropic medications. She endorsed feeling socially anxious and described a history of panic attacks. Her most recent panic attack occurred 2 weeks ago after she received a traffic ticket. She stated that her panic attacks are relatively infrequent. She reported that she has also been feeling anxious about the transition from college to  real life.  Ms. Fritz described a history of mildly disordered eating and acknowledged that the function of her behavior is to exercise control over her life. She denied a history of bulimia or anorexia. She meets with her psychiatrist monthly. She reported benefit from her prescribed escitalopram, hydroxyzine, and lorazepam. She indicated rare use of hydroxyzine and lorazepam. She also meets every other week with her psychotherapist, with whom she has worked for over one year. She reported good rapport with her current psychotherapist. She denied any history of auditory/visual hallucinations, delusions, or abuse. She stated that she experiences passive suicidal ideation approximately 3 times per year. She denied any current or historical suicidal plans or intentions. She denied past suicide attempts.    SUBSTANCE USE HISTORY  Ms. Fritz denied current  or historical use of alcohol, tobacco, or illicit substances. She smokes cannabis daily for pain management. She last smoked cannabis the night prior to the current evaluation. She denied a history of chemical dependency treatment.     HEALTH BEHAVIORS  Sleep: She sleeps 8 hours per night on average. She reported frequent difficulties with sleep initiation and maintenance. She added that she has been diagnosed with insomnia. She noted that her brain  won't shut off  at night. She reported that she takes trazodone to aid with sleep. She denied a history of snoring, sleep apnea, or dream enactment behaviors. She reported that she occasionally naps during the day.  Appetite/Weight: She stated that her lupus reduces her ability to detect hunger cues. She reported that her recent norovirus infection resulted in 20-lbs. weight loss in 1 week.   Exercise: She denied regular exercise.  Pain: She described a daily headache for the past year. She reported that she awoke with a headache on the morning of the current evaluation, but she noted that it had mostly resolved during the clinical interview. She also indicated arthritic pain in her hands and worsening pain in her knees and hips with colder weather.    ACTIVE PROBLEM LIST (PER EMR) AND MEDICAL HISTORY  Systemic lupus erythematosus (H)  Chronic abdominal pain  Anxiety  Other systemic lupus erythematosus with other organ involvement (H)  Proteinuria  Depression, unspecified depression type  Ms. Fritz denied a known history of stroke, seizure, or head injury with loss of consciousness.     RELEVANT FAMILY HISTORY   Ms. Fritz reported a family medical history of Alzheimer's disease (paternal grandfather), possible dyslexia (father), alcoholism (paternal family), depression (paternal family), anxiety (paternal family), and suspected bipolar disease (mother)    MEDICATION LIST (PER EMR)  Cobalamine Combinations (B12 FOLATE PO)  diphenhydrAMINE (BENADRYL) 25 MG capsule  "  escitalopram (LEXAPRO) 10 MG tablet   escitalopram (LEXAPRO) 20 MG tablet   hydroxychloroquine (PLAQUENIL) 200 MG tablet  hydrOXYzine (ATARAX) 25 MG tablet   insulin syringe 31G X \" 1 ML MISC)  levonorgestrel (KYLEENA) 19.5 MG IUD  nabumetone (RELAFEN) 500 MG tablet  traZODone (DESYREL) 50 MG tablet   vitamin D3 (CHOLECALCIFEROL) 2000 units (50 mcg) tablet    PSYCHOSOCIAL HISTORY  Born in [place], [place] and raised in [place], [place]  Birth/Development: Her  history was complicated by a nuchal cord.   Language: English  Educational History: She described herself as a straight-A student in school. She reported having difficulty in math in high school. She stated that she was able to focus on subjects that she found interesting. She described difficulty with paying attention during lectures if she was not taking notes. She reported being a fast reader and test taker. However, she stated that she experienced test anxiety. She reliably turned in her homework in school. She reported that she had a section 504 plan in high school and college. She completed 1 year of college at [employer] before transferring to the [employer]. She graduated magna cum laude in May of 2025 with her bachelor's in English and 3 minors in writing.    Occupational History: She is currently working as a  at [employer] over the summer. This is her fourth year as . She has an unpaid internship through the [employer] that begins during the next few months as well. She previously nannied and worked in customer service.  Marital Status: Single  Children: None  Social Support: She reported a strong social support network including her mother and several good friends.    BEHAVIORAL OBSERVATIONS  Ms. Fritz arrived on time and was unaccompanied. She was casually dressed and appropriately groomed. Gait was unremarkable. No tremor or postural " abnormalities were observed. Vision and hearing appeared adequate. She wore glasses throughout the evaluation. She was pleasant and cooperative. Speech was fluent and normal for rate, volume, and prosody. Comprehension appeared adequate. Thought processes were goal-directed and linear. She was a good personal historian and appeared to have good insight. Mood was euthymic with congruent affect. No juan pablo or responses to internal stimuli were observed. On testing, she appeared alert and attentive. Rapport was easily established and maintained. She demonstrated adequate frustration tolerance. She appeared to put forth good and consistent effort.      RESULTS AND INTERPRETATION  Performance Validity: Results of stand-alone and embedded measures for cognitive performance validity were within expectation. The test results are believed to accurately represent Ms. Fritz's current cognitive abilities.   Orientation: She was oriented to personal information and most aspects of place and time. She could not recall the name of the clinic. She misreported that date by 3 days but was otherwise oriented to time.   Premorbid Cognitive Ability: Ms. Fritz performed in the high average range on a task of single word reading.   Attention & Working Memory: Basic auditory attention and auditory working memory were average. Mental calculations were also average.  Processing Speed: Her performance on a speeded visual scanning task was average. Her performances on speeded sequencing tasks with visual scanning and psychomotor demands were average. Her performance on a speeded visuomotor coding task was average, with no errors. Speeded word reading was below average, while speeded color naming was average.   Language: Confrontation naming of pictures was performed without error. Letter-based verbal fluency was low average, with 4 set-loss errors. Category-based verbal fluency was low average, with 1 set-loss error and 1 repetition. Verbal  abstract reasoning was average.   Visual Perceptual & Constructional Skills: Visuospatial judgments of variably oriented lines were performed within expectation. Her copy of a complex figure was within expected limits, with no evidence of errors in the overall figural gestalt. Visual problem-solving with blocks was average.   Learning & Anterograde Memory: Learning of a word list over multiple learning trials was average, with a positive learning curve. Free recall of the word list after a short delay was below average, while cued recall after a short delay was average. Free recall of the word list after a long delay was low average, and cued recall after a long delay was average. Delayed recognition of list words was above average, with 1 false-positive error. Immediate memory for verbal stories was average, while delayed memory was low average. Delayed recognition of story details was average. Immediate recall of geometric shapes was average, while delayed recall was high average. Recognition of the shapes was performed without error.    Executive Functioning: She performed in the average range on a complex set-shifting task with visual scanning and psychomotor demands, with no errors. Her copy of a complex figure was well-organized. Inhibition of an overlearned verbal response was average.   Motor Functioning: Motor speed was low average. Fine motor dexterity was below average in her dominant (right) hand and high average in her non-dominant hand.   Emotional & Behavioral Functioning: She reported moderate symptoms of depression and anxiety on self-report measures, consistent with her report on the clinical interview.     IMPRESSIONS  Ms. Fritz demonstrated normal range cognitive functioning across most assessed domains. There was mild variability and weaknesses suggestive of subcortical system inefficiencies. Subtle variability on testing is likely attributable to her fatigue and pain (associated with her  history of lupus and arthritis). There may also be contributions from insomnia, mood and anxiety symptoms (she is reporting moderate symptoms of both depression and anxiety), and/or acute effects of cannabis. Compared to her prior evaluation in 2021, there was no meaningful pattern of decline within any cognitive domain. There were isolated declines on a contextualized verbal memory task and a speeded motor task. There were improvements on visual problem-solving, visual memory, and word list recognition tasks. The remainder of her scores were stable. On evaluation, Ms. Fritz demonstrated normal range cognitive functioning, in keeping with her broadly average range cognitive baseline. Verbal processing speed and verbal recall performances were mildly variable. Dominant (right) fine motor dexterity and speed was a relative weakness. Visual recall was a relative and normative strength.     RECOMMENDATIONS  Results and recommendations of this evaluation were shared with Ms. Fritz via telephone on 5/28/2025. Ms. Fritz is encouraged to speak with her medical providers regarding any of the referrals suggested below.     Ms. Fritz is encouraged to continue regular meetings with her mental health providers for management of her mood and anxiety symptoms.   She is also encouraged to continue regular follow-up with her rheumatologist for management of her lupus and arthritis.   Given Ms. Fritz's reported sleep difficulties, the below sleep hygiene strategies from the National Sleep Foundation may be helpful:  Avoid stimulants such as caffeine too close to bedtime.  Exercise can promote good sleep. Vigorous exercise should be done in the morning or late afternoon. A relaxing exercise (e.g., light stretching, yoga, meditation, deep breathing) or other relaxing activity (e.g., listening to soft music, reading) can be done before bed to help initiate a restful night's sleep.  Establish a regular, relaxing bedtime  routine. Try to avoid emotionally upsetting conversations and activities before trying to go to sleep. Try not to dwell on or bring your problems to bed.  Optimize your bedroom with a comfortable mattress, pillow, and bedding; use heavy curtains or an eye mask to prevent light from interrupting your sleep. Light smells, such as lavender, can induce a calmer state of mind and cultivate a positive space for sleep. The room should not be too hot, too cold, or too bright.   Avoid bright lights and electronics at least 30 minutes before bed.   Food can be disruptive right before sleep; stay away from large meals, spicy dishes, and chocolate close to bedtime.  Ensure adequate exposure to natural light during the day. Light exposure helps maintain a healthy sleep-wake cycle.  Associate your bed with sleep. Try to avoid using your bed to watch TV or read.  If you can't fall asleep, get out of bed and do another relaxing activity for a while.  Keep naps short and limited to the early afternoon.   For additional information, a simple set of guidelines can be found here: https://www.sleepfoundation.org/sleep-topics/sleep-hygiene  Ms. Fritz stated that she uses cannabis to aid with pain and sleep. Daily cannabis use may interfere with cognitive functioning, though the benefits may outweigh the risks for her. Should her cognitive symptoms become more bothersome, she may consider reducing her cannabis use to optimize cognitive efficiency.   She is encouraged to engage in physical exercise, to the extent that she is able and in consultation with her medical providers. Physical activity can alleviate mood symptoms as well as provide benefits for overall brain health.   The following compensatory strategies may be helpful for Ms. Fritz:  Use external aids to increase structure in daily life. Use of compensatory strategies such as notes, lists, and a centralized calendar are supported. Tools such as smart phones with alarms and  reminders are helpful in bringing structure to daily activities.  Keep a set daily schedule and routine for activities (e.g., keep a routine bedtime).  To accommodate fatigue, it may be helpful to take frequent mental breaks throughout the workday.   Identify the period of the day when you have the most energy and reserve this time for completing important or mentally taxing tasks.  Process information that needs to be retained both verbally (e.g., repeat it aloud) and visually (e.g., write it down).  Place visual cues in highly visible locations to remember important tasks/information.  Repeat and/or provide context to information that you need to remember (e.g., make up a story or mnemonic device).  You may find that you need to make a conscious effort to pay attention to conversations or when learning new information. When attention is not focused, it is difficult for the brain to learn new information. Thus, making a mindful effort to pay attention as you go through daily tasks will assist and facilitate memory recall later on.  Allot extra time to complete tasks, especially when tasks involve fine motor abilities.   The results of the current evaluation constitute an updated baseline of Ms. Fritz's cognitive and emotional functioning and further evaluation is currently not clinically indicated. Should she experience cognitive changes in the future, she may be referred for a neuropsychological re-evaluation at that time.    Kayley Spann, Ph.D., L.P.  Postdoctoral Neuropsychology Fellow    Chase Talavera, Ph.D., L.P., ABPP  Board Certified in Clinical Neuropsychology   / Licensed Psychologist [id number]    All services provided by the postdoctoral fellow were supervised by this licensed psychologist. All billing noted here is for professional services provided by the psychologist and the psychometrist.      Activities included in this evaluation: CPT Code # of Units Time (min)   Psychiatric  diagnostic interview 61796 1 --   Test evaluation services by professional; first hour 15761 1 75   Test evaluation services by professional; additional hour (+) 78853 0    Test administration & scoring by technician; first 30 mins 51141 1 144   Test administration & scoring by technician; additional 30 mins (+) [id number] 4    Diagnoses: M32.9, F41.9, F32.A, F06.8.

## 2025-06-02 NOTE — PROGRESS NOTES
Name: Lorena Fritz MRN: [id number]  : 2000  LYON:   Staff: ILSA Tech: HH Age: 24  Sex: Female Hand: Right Educ: 16  Vision: 20 ?with correction / []without correction    ORIENTATION     Time  -3     Place       Personal info         WAIS-IV     WMI: ~97       Raw SSa     Similarities  23 9     Block Design  50 11     Digit Span  24 8 RDS= 8     Arithmetic  15 11     Coding  78 11    [place] Verbal Learning Test, Third Edition           Raw Score Standard Score   Trials 1-5 Correct  41 90   Delayed Recall Correct  29 86   Total Recall Correct  79 88                 Raw Score Scaled Score     Trial 1 6 11     Trial 2  7 8     Trial 3 7 7     Trial 4  9 8     Trial 5  10 7     List B  5 9     Short Delay Free Recall 5 5     Short Delay Cued Recall 11 9     Long Delay Free Recall 9 7     Long Delay Cued Recall 10 8     Total Repetitions 1 13     Total Intrusions 0 12     Recognition Total Hits 16 14     Recognition Total False Positives 1 9     Recognition Forced Choice 16 n/a      VINNIE-O    Raw    T %ile     Copy    34.0     >16     Time to Copy  200     >16    WMS-IV  Raw SS / %ile     LM I  28 11     LM II  15 7     LM Recog. 26  51-75     Vis Rep I 41 11     Vis Rep II 37 12     Vis Rep Rec 7 >75    WRAT5   SS %ile Grade Equiv.     Word Reading  112 79 >12.9    COWAT (FAS)   Raw: 37   T: 37  Animals   Raw:  21  T-score:  41    NAB NAMING TEST   Raw: 31   T: 58     [place] Executive Function System Trail Making Test  Measure Raw Score Scaled Score   Visual Scanning 29 7   Number Sequencing 32 9   Letter Sequencing 27 11   Number-Letter Switching 59 11   Motor Speed 48 7     STROOP Raw T   Word 75 33   Color  71 44       Color/Word  42 47         OMARI   Raw: 13  %ile: 72 (w/corrections)    GROOVED PEGBOARD    Raw  T Drops   RH  71  31 2   LH 57  57 0    BDI-II  Raw:  24  Interpretation: Moderate    MIRACLE  Raw:  22  Interpretation: Moderate    ACS Word Choice   Total Raw Score: 49